# Patient Record
Sex: MALE | Race: WHITE | NOT HISPANIC OR LATINO | URBAN - METROPOLITAN AREA
[De-identification: names, ages, dates, MRNs, and addresses within clinical notes are randomized per-mention and may not be internally consistent; named-entity substitution may affect disease eponyms.]

---

## 2017-04-01 ENCOUNTER — OUTPATIENT (OUTPATIENT)
Dept: OUTPATIENT SERVICES | Facility: HOSPITAL | Age: 72
LOS: 1 days | Discharge: HOME | End: 2017-04-01

## 2017-06-27 DIAGNOSIS — I10 ESSENTIAL (PRIMARY) HYPERTENSION: ICD-10-CM

## 2017-06-27 DIAGNOSIS — E78.00 PURE HYPERCHOLESTEROLEMIA, UNSPECIFIED: ICD-10-CM

## 2017-06-27 DIAGNOSIS — Z00.00 ENCOUNTER FOR GENERAL ADULT MEDICAL EXAMINATION WITHOUT ABNORMAL FINDINGS: ICD-10-CM

## 2017-07-15 ENCOUNTER — OUTPATIENT (OUTPATIENT)
Dept: OUTPATIENT SERVICES | Facility: HOSPITAL | Age: 72
LOS: 1 days | Discharge: HOME | End: 2017-07-15

## 2017-07-15 DIAGNOSIS — E78.5 HYPERLIPIDEMIA, UNSPECIFIED: ICD-10-CM

## 2017-07-15 DIAGNOSIS — E78.00 PURE HYPERCHOLESTEROLEMIA, UNSPECIFIED: ICD-10-CM

## 2017-07-15 DIAGNOSIS — E11.9 TYPE 2 DIABETES MELLITUS WITHOUT COMPLICATIONS: ICD-10-CM

## 2017-11-04 ENCOUNTER — OUTPATIENT (OUTPATIENT)
Dept: OUTPATIENT SERVICES | Facility: HOSPITAL | Age: 72
LOS: 1 days | Discharge: HOME | End: 2017-11-04

## 2017-11-04 DIAGNOSIS — I10 ESSENTIAL (PRIMARY) HYPERTENSION: ICD-10-CM

## 2017-11-04 DIAGNOSIS — E78.00 PURE HYPERCHOLESTEROLEMIA, UNSPECIFIED: ICD-10-CM

## 2017-11-04 DIAGNOSIS — D51.0 VITAMIN B12 DEFICIENCY ANEMIA DUE TO INTRINSIC FACTOR DEFICIENCY: ICD-10-CM

## 2018-03-30 ENCOUNTER — OUTPATIENT (OUTPATIENT)
Dept: OUTPATIENT SERVICES | Facility: HOSPITAL | Age: 73
LOS: 1 days | Discharge: HOME | End: 2018-03-30

## 2018-03-30 DIAGNOSIS — D51.0 VITAMIN B12 DEFICIENCY ANEMIA DUE TO INTRINSIC FACTOR DEFICIENCY: ICD-10-CM

## 2018-03-30 DIAGNOSIS — E78.00 PURE HYPERCHOLESTEROLEMIA, UNSPECIFIED: ICD-10-CM

## 2018-03-30 DIAGNOSIS — I10 ESSENTIAL (PRIMARY) HYPERTENSION: ICD-10-CM

## 2018-07-28 ENCOUNTER — OUTPATIENT (OUTPATIENT)
Dept: OUTPATIENT SERVICES | Facility: HOSPITAL | Age: 73
LOS: 1 days | Discharge: HOME | End: 2018-07-28

## 2018-07-28 DIAGNOSIS — E78.00 PURE HYPERCHOLESTEROLEMIA, UNSPECIFIED: ICD-10-CM

## 2018-07-28 DIAGNOSIS — E78.5 HYPERLIPIDEMIA, UNSPECIFIED: ICD-10-CM

## 2018-07-28 DIAGNOSIS — D64.9 ANEMIA, UNSPECIFIED: ICD-10-CM

## 2018-07-28 DIAGNOSIS — I10 ESSENTIAL (PRIMARY) HYPERTENSION: ICD-10-CM

## 2018-07-28 DIAGNOSIS — Z00.00 ENCOUNTER FOR GENERAL ADULT MEDICAL EXAMINATION WITHOUT ABNORMAL FINDINGS: ICD-10-CM

## 2018-11-19 ENCOUNTER — OUTPATIENT (OUTPATIENT)
Dept: OUTPATIENT SERVICES | Facility: HOSPITAL | Age: 73
LOS: 1 days | Discharge: HOME | End: 2018-11-19

## 2018-11-19 DIAGNOSIS — Z00.00 ENCOUNTER FOR GENERAL ADULT MEDICAL EXAMINATION WITHOUT ABNORMAL FINDINGS: ICD-10-CM

## 2018-11-19 DIAGNOSIS — N40.0 BENIGN PROSTATIC HYPERPLASIA WITHOUT LOWER URINARY TRACT SYMPTOMS: ICD-10-CM

## 2018-11-19 DIAGNOSIS — E78.00 PURE HYPERCHOLESTEROLEMIA, UNSPECIFIED: ICD-10-CM

## 2018-11-19 DIAGNOSIS — I10 ESSENTIAL (PRIMARY) HYPERTENSION: ICD-10-CM

## 2019-05-07 ENCOUNTER — APPOINTMENT (OUTPATIENT)
Dept: VASCULAR SURGERY | Facility: CLINIC | Age: 74
End: 2019-05-07
Payer: COMMERCIAL

## 2019-05-07 VITALS
HEIGHT: 68 IN | BODY MASS INDEX: 34.1 KG/M2 | WEIGHT: 225 LBS | SYSTOLIC BLOOD PRESSURE: 126 MMHG | DIASTOLIC BLOOD PRESSURE: 82 MMHG

## 2019-05-07 PROCEDURE — 99203 OFFICE O/P NEW LOW 30 MIN: CPT

## 2019-05-07 NOTE — HISTORY OF PRESENT ILLNESS
[FreeTextEntry1] : The patient is a 73-year-old gentleman who had a recent lumbar spine x-ray and had an incidental finding of a 5 cm calcified aortic aneurysm. The patient was sent for a CTA of his abdomen and pelvis and showed a 4 x 4 cm infrarenal no leg aneurysm.

## 2019-05-07 NOTE — ASSESSMENT
[FreeTextEntry1] : The patient is a 72 yo male with a 4.0 cm AAA. I would recommend conservative management

## 2019-06-08 ENCOUNTER — OUTPATIENT (OUTPATIENT)
Dept: OUTPATIENT SERVICES | Facility: HOSPITAL | Age: 74
LOS: 1 days | Discharge: HOME | End: 2019-06-08

## 2019-06-08 DIAGNOSIS — I10 ESSENTIAL (PRIMARY) HYPERTENSION: ICD-10-CM

## 2019-06-08 DIAGNOSIS — E78.00 PURE HYPERCHOLESTEROLEMIA, UNSPECIFIED: ICD-10-CM

## 2019-06-08 DIAGNOSIS — Z00.00 ENCOUNTER FOR GENERAL ADULT MEDICAL EXAMINATION WITHOUT ABNORMAL FINDINGS: ICD-10-CM

## 2019-10-14 ENCOUNTER — OUTPATIENT (OUTPATIENT)
Dept: OUTPATIENT SERVICES | Facility: HOSPITAL | Age: 74
LOS: 1 days | Discharge: HOME | End: 2019-10-14

## 2019-10-14 DIAGNOSIS — D51.0 VITAMIN B12 DEFICIENCY ANEMIA DUE TO INTRINSIC FACTOR DEFICIENCY: ICD-10-CM

## 2019-10-14 DIAGNOSIS — N40.0 BENIGN PROSTATIC HYPERPLASIA WITHOUT LOWER URINARY TRACT SYMPTOMS: ICD-10-CM

## 2019-10-14 DIAGNOSIS — Z00.00 ENCOUNTER FOR GENERAL ADULT MEDICAL EXAMINATION WITHOUT ABNORMAL FINDINGS: ICD-10-CM

## 2019-10-14 DIAGNOSIS — E78.00 PURE HYPERCHOLESTEROLEMIA, UNSPECIFIED: ICD-10-CM

## 2019-11-19 ENCOUNTER — APPOINTMENT (OUTPATIENT)
Dept: VASCULAR SURGERY | Facility: CLINIC | Age: 74
End: 2019-11-19
Payer: COMMERCIAL

## 2019-11-22 ENCOUNTER — APPOINTMENT (OUTPATIENT)
Dept: VASCULAR SURGERY | Facility: CLINIC | Age: 74
End: 2019-11-22
Payer: COMMERCIAL

## 2019-11-22 VITALS
HEIGHT: 68 IN | SYSTOLIC BLOOD PRESSURE: 118 MMHG | WEIGHT: 220 LBS | BODY MASS INDEX: 33.34 KG/M2 | DIASTOLIC BLOOD PRESSURE: 74 MMHG

## 2019-11-22 PROCEDURE — 93978 VASCULAR STUDY: CPT

## 2019-11-22 PROCEDURE — 99213 OFFICE O/P EST LOW 20 MIN: CPT

## 2019-11-22 NOTE — ASSESSMENT
[FreeTextEntry1] : 75 y/o gentleman with AAA presents for routine follow up, denies any complaints. I performed an arterial duplex which was medically necessary to evaluate for increase in size of the AAA. It showed infrarenal AAA at 4.3 cm. It is not at a size requiring any surgical intervention at this time.\par I have informed him of the test results and advised follow up in six months for repeat aortic duplex.

## 2019-11-22 NOTE — DATA REVIEWED
[FreeTextEntry1] : I performed an arterial duplex which was medically necessary to evaluate for increase in size of the AAA. It showed infrarenal AAA at 4.3 cm.\par

## 2020-02-04 ENCOUNTER — INPATIENT (INPATIENT)
Facility: HOSPITAL | Age: 75
LOS: 9 days | Discharge: NURSING, REHAB OR LONG-TERM CARE | End: 2020-02-14
Attending: HOSPITALIST | Admitting: HOSPITALIST
Payer: COMMERCIAL

## 2020-02-04 VITALS
WEIGHT: 197.98 LBS | HEART RATE: 80 BPM | RESPIRATION RATE: 18 BRPM | DIASTOLIC BLOOD PRESSURE: 108 MMHG | TEMPERATURE: 98 F | OXYGEN SATURATION: 99 % | SYSTOLIC BLOOD PRESSURE: 127 MMHG

## 2020-02-04 LAB
ALBUMIN SERPL ELPH-MCNC: 4.3 G/DL — SIGNIFICANT CHANGE UP (ref 3.5–5.2)
ALP SERPL-CCNC: 53 U/L — SIGNIFICANT CHANGE UP (ref 30–115)
ALT FLD-CCNC: 13 U/L — SIGNIFICANT CHANGE UP (ref 0–41)
ANION GAP SERPL CALC-SCNC: 13 MMOL/L — SIGNIFICANT CHANGE UP (ref 7–14)
ANION GAP SERPL CALC-SCNC: 15 MMOL/L — HIGH (ref 7–14)
AST SERPL-CCNC: 38 U/L — SIGNIFICANT CHANGE UP (ref 0–41)
BASOPHILS # BLD AUTO: 0.04 K/UL — SIGNIFICANT CHANGE UP (ref 0–0.2)
BASOPHILS NFR BLD AUTO: 0.5 % — SIGNIFICANT CHANGE UP (ref 0–1)
BILIRUB SERPL-MCNC: 0.3 MG/DL — SIGNIFICANT CHANGE UP (ref 0.2–1.2)
BUN SERPL-MCNC: 20 MG/DL — SIGNIFICANT CHANGE UP (ref 10–20)
BUN SERPL-MCNC: 20 MG/DL — SIGNIFICANT CHANGE UP (ref 10–20)
CALCIUM SERPL-MCNC: 9.1 MG/DL — SIGNIFICANT CHANGE UP (ref 8.5–10.1)
CALCIUM SERPL-MCNC: 9.2 MG/DL — SIGNIFICANT CHANGE UP (ref 8.5–10.1)
CHLORIDE SERPL-SCNC: 100 MMOL/L — SIGNIFICANT CHANGE UP (ref 98–110)
CHLORIDE SERPL-SCNC: 99 MMOL/L — SIGNIFICANT CHANGE UP (ref 98–110)
CO2 SERPL-SCNC: 18 MMOL/L — SIGNIFICANT CHANGE UP (ref 17–32)
CO2 SERPL-SCNC: 21 MMOL/L — SIGNIFICANT CHANGE UP (ref 17–32)
CREAT SERPL-MCNC: 1.1 MG/DL — SIGNIFICANT CHANGE UP (ref 0.7–1.5)
CREAT SERPL-MCNC: 1.1 MG/DL — SIGNIFICANT CHANGE UP (ref 0.7–1.5)
EOSINOPHIL # BLD AUTO: 0.2 K/UL — SIGNIFICANT CHANGE UP (ref 0–0.7)
EOSINOPHIL NFR BLD AUTO: 2.4 % — SIGNIFICANT CHANGE UP (ref 0–8)
GLUCOSE SERPL-MCNC: 124 MG/DL — HIGH (ref 70–99)
GLUCOSE SERPL-MCNC: 96 MG/DL — SIGNIFICANT CHANGE UP (ref 70–99)
HCT VFR BLD CALC: 38.7 % — LOW (ref 42–52)
HGB BLD-MCNC: 11.7 G/DL — LOW (ref 14–18)
IMM GRANULOCYTES NFR BLD AUTO: 0.6 % — HIGH (ref 0.1–0.3)
IRON SATN MFR SERPL: 24 UG/DL — LOW (ref 35–150)
IRON SATN MFR SERPL: 6 % — LOW (ref 15–50)
LYMPHOCYTES # BLD AUTO: 2.14 K/UL — SIGNIFICANT CHANGE UP (ref 1.2–3.4)
LYMPHOCYTES # BLD AUTO: 25.4 % — SIGNIFICANT CHANGE UP (ref 20.5–51.1)
MCHC RBC-ENTMCNC: 23.1 PG — LOW (ref 27–31)
MCHC RBC-ENTMCNC: 30.2 G/DL — LOW (ref 32–37)
MCV RBC AUTO: 76.3 FL — LOW (ref 80–94)
MONOCYTES # BLD AUTO: 0.66 K/UL — HIGH (ref 0.1–0.6)
MONOCYTES NFR BLD AUTO: 7.8 % — SIGNIFICANT CHANGE UP (ref 1.7–9.3)
NEUTROPHILS # BLD AUTO: 5.34 K/UL — SIGNIFICANT CHANGE UP (ref 1.4–6.5)
NEUTROPHILS NFR BLD AUTO: 63.3 % — SIGNIFICANT CHANGE UP (ref 42.2–75.2)
NRBC # BLD: 0 /100 WBCS — SIGNIFICANT CHANGE UP (ref 0–0)
OSMOLALITY SERPL: 297 MOSMOL/KG — SIGNIFICANT CHANGE UP (ref 280–301)
PLATELET # BLD AUTO: 293 K/UL — SIGNIFICANT CHANGE UP (ref 130–400)
POTASSIUM SERPL-MCNC: 5.3 MMOL/L — HIGH (ref 3.5–5)
POTASSIUM SERPL-MCNC: 5.8 MMOL/L — HIGH (ref 3.5–5)
POTASSIUM SERPL-SCNC: 5.3 MMOL/L — HIGH (ref 3.5–5)
POTASSIUM SERPL-SCNC: 5.8 MMOL/L — HIGH (ref 3.5–5)
PROT SERPL-MCNC: 7.6 G/DL — SIGNIFICANT CHANGE UP (ref 6–8)
RBC # BLD: 5.07 M/UL — SIGNIFICANT CHANGE UP (ref 4.7–6.1)
RBC # FLD: 17.5 % — HIGH (ref 11.5–14.5)
SODIUM SERPL-SCNC: 132 MMOL/L — LOW (ref 135–146)
SODIUM SERPL-SCNC: 134 MMOL/L — LOW (ref 135–146)
TIBC SERPL-MCNC: 423 UG/DL — SIGNIFICANT CHANGE UP (ref 220–430)
TRANSFERRIN SERPL-MCNC: 331 MG/DL — SIGNIFICANT CHANGE UP (ref 200–360)
UIBC SERPL-MCNC: 399 UG/DL — HIGH (ref 110–370)
WBC # BLD: 8.43 K/UL — SIGNIFICANT CHANGE UP (ref 4.8–10.8)
WBC # FLD AUTO: 8.43 K/UL — SIGNIFICANT CHANGE UP (ref 4.8–10.8)

## 2020-02-04 PROCEDURE — 99285 EMERGENCY DEPT VISIT HI MDM: CPT

## 2020-02-04 PROCEDURE — 93010 ELECTROCARDIOGRAM REPORT: CPT

## 2020-02-04 PROCEDURE — 71045 X-RAY EXAM CHEST 1 VIEW: CPT | Mod: 26

## 2020-02-04 RX ORDER — MORPHINE SULFATE 50 MG/1
2 CAPSULE, EXTENDED RELEASE ORAL EVERY 4 HOURS
Refills: 0 | Status: DISCONTINUED | OUTPATIENT
Start: 2020-02-04 | End: 2020-02-06

## 2020-02-04 RX ORDER — OXYCODONE HYDROCHLORIDE 5 MG/1
10 TABLET ORAL EVERY 4 HOURS
Refills: 0 | Status: DISCONTINUED | OUTPATIENT
Start: 2020-02-04 | End: 2020-02-11

## 2020-02-04 RX ORDER — DEXAMETHASONE 0.5 MG/5ML
10 ELIXIR ORAL ONCE
Refills: 0 | Status: COMPLETED | OUTPATIENT
Start: 2020-02-04 | End: 2020-02-04

## 2020-02-04 RX ORDER — SIMVASTATIN 20 MG/1
80 TABLET, FILM COATED ORAL AT BEDTIME
Refills: 0 | Status: DISCONTINUED | OUTPATIENT
Start: 2020-02-04 | End: 2020-02-11

## 2020-02-04 RX ORDER — ACETAMINOPHEN 500 MG
650 TABLET ORAL EVERY 4 HOURS
Refills: 0 | Status: DISCONTINUED | OUTPATIENT
Start: 2020-02-04 | End: 2020-02-07

## 2020-02-04 RX ORDER — ENOXAPARIN SODIUM 100 MG/ML
40 INJECTION SUBCUTANEOUS DAILY
Refills: 0 | Status: DISCONTINUED | OUTPATIENT
Start: 2020-02-04 | End: 2020-02-07

## 2020-02-04 RX ORDER — PANTOPRAZOLE SODIUM 20 MG/1
40 TABLET, DELAYED RELEASE ORAL
Refills: 0 | Status: DISCONTINUED | OUTPATIENT
Start: 2020-02-04 | End: 2020-02-11

## 2020-02-04 RX ORDER — GABAPENTIN 400 MG/1
100 CAPSULE ORAL THREE TIMES A DAY
Refills: 0 | Status: DISCONTINUED | OUTPATIENT
Start: 2020-02-04 | End: 2020-02-07

## 2020-02-04 RX ORDER — FERROUS SULFATE 325(65) MG
325 TABLET ORAL DAILY
Refills: 0 | Status: DISCONTINUED | OUTPATIENT
Start: 2020-02-04 | End: 2020-02-11

## 2020-02-04 RX ORDER — SENNA PLUS 8.6 MG/1
2 TABLET ORAL AT BEDTIME
Refills: 0 | Status: DISCONTINUED | OUTPATIENT
Start: 2020-02-04 | End: 2020-02-07

## 2020-02-04 RX ORDER — LANOLIN ALCOHOL/MO/W.PET/CERES
5 CREAM (GRAM) TOPICAL AT BEDTIME
Refills: 0 | Status: DISCONTINUED | OUTPATIENT
Start: 2020-02-04 | End: 2020-02-11

## 2020-02-04 RX ORDER — TRAMADOL HYDROCHLORIDE 50 MG/1
50 TABLET ORAL EVERY 6 HOURS
Refills: 0 | Status: DISCONTINUED | OUTPATIENT
Start: 2020-02-04 | End: 2020-02-06

## 2020-02-04 RX ORDER — OXYCODONE AND ACETAMINOPHEN 5; 325 MG/1; MG/1
1 TABLET ORAL ONCE
Refills: 0 | Status: DISCONTINUED | OUTPATIENT
Start: 2020-02-04 | End: 2020-02-04

## 2020-02-04 RX ORDER — LISINOPRIL 2.5 MG/1
20 TABLET ORAL EVERY 12 HOURS
Refills: 0 | Status: DISCONTINUED | OUTPATIENT
Start: 2020-02-04 | End: 2020-02-07

## 2020-02-04 RX ORDER — POLYETHYLENE GLYCOL 3350 17 G/17G
17 POWDER, FOR SOLUTION ORAL DAILY
Refills: 0 | Status: DISCONTINUED | OUTPATIENT
Start: 2020-02-04 | End: 2020-02-07

## 2020-02-04 RX ORDER — KETOROLAC TROMETHAMINE 30 MG/ML
30 SYRINGE (ML) INJECTION ONCE
Refills: 0 | Status: DISCONTINUED | OUTPATIENT
Start: 2020-02-04 | End: 2020-02-04

## 2020-02-04 RX ADMIN — Medication 10 MILLIGRAM(S): at 11:39

## 2020-02-04 RX ADMIN — GABAPENTIN 100 MILLIGRAM(S): 400 CAPSULE ORAL at 21:57

## 2020-02-04 RX ADMIN — OXYCODONE HYDROCHLORIDE 10 MILLIGRAM(S): 5 TABLET ORAL at 19:20

## 2020-02-04 RX ADMIN — MORPHINE SULFATE 2 MILLIGRAM(S): 50 CAPSULE, EXTENDED RELEASE ORAL at 22:54

## 2020-02-04 RX ADMIN — Medication 30 MILLIGRAM(S): at 11:39

## 2020-02-04 RX ADMIN — SIMVASTATIN 80 MILLIGRAM(S): 20 TABLET, FILM COATED ORAL at 21:56

## 2020-02-04 RX ADMIN — LISINOPRIL 20 MILLIGRAM(S): 2.5 TABLET ORAL at 21:56

## 2020-02-04 RX ADMIN — OXYCODONE AND ACETAMINOPHEN 1 TABLET(S): 5; 325 TABLET ORAL at 11:39

## 2020-02-04 RX ADMIN — Medication 5 MILLIGRAM(S): at 21:57

## 2020-02-04 RX ADMIN — MORPHINE SULFATE 2 MILLIGRAM(S): 50 CAPSULE, EXTENDED RELEASE ORAL at 17:39

## 2020-02-04 NOTE — H&P ADULT - NSICDXPASTMEDICALHX_GEN_ALL_CORE_FT
PAST MEDICAL HISTORY:  Abdominal aortic aneurysm     Elevated lipids     History of degenerative disc disease     HTN (hypertension)

## 2020-02-04 NOTE — CONSULT NOTE ADULT - SUBJECTIVE AND OBJECTIVE BOX
HPI:  [74 year old male]    CC: Back Pain    PMH: Multiple herniated discs, HTN, HLD, GERD, AAA     History of Present Illness goes back to the past month when the patient started having back pain that has been chronic but with recent worsening. He was seeing neurology, rehab and pain management with little improvement. This morning the pain was so bad upon walking that he called EMS and presented to the ED. He had an MRI done which showed multiple levels of degenerative disease and disc herniations. He denies numbness, weakness, saddle anesthesia, urinary retention of fecal incontinence.     In the ED, vitals were stable. He was given decadron, ketorolac and oxycodone (04 Feb 2020 14:54)      PAST MEDICAL & SURGICAL HISTORY:  History of degenerative disc disease  Elevated lipids  Abdominal aortic aneurysm  HTN (hypertension)      Hospital Course:  He has a copy of the rport of his recent lumbar spine MRI. He has been experiencing LLE radicular symptoms for a month which have bcome severe for the last one week. He gets signif pain with standing. He reports even with a cane for the last week it wouldn't allow him to walk on account of the severe pain. earlier he had LLE radicular symptoms and left knee buckling which appeared to get better initially.   TODAY'S SUBJECTIVE & REVIEW OF SYMPTOMS:     Constitutional WNL   Cardio WNL   Resp WNL   GI WNL  Heme WNL  Endo WNL  Skin WNL  MSK WNL  Neuro WNL  Cognitive WNL  Psych WNL      MEDICATIONS  (STANDING):  enoxaparin Injectable 40 milliGRAM(s) SubCutaneous daily  gabapentin 100 milliGRAM(s) Oral three times a day  lisinopril 20 milliGRAM(s) Oral every 12 hours  pantoprazole    Tablet 40 milliGRAM(s) Oral before breakfast  simvastatin Oral Tab/Cap - Peds 80 milliGRAM(s) Oral at bedtime    MEDICATIONS  (PRN):  acetaminophen   Tablet .. 650 milliGRAM(s) Oral every 4 hours PRN Mild Pain (1 - 3)  morphine  - Injectable 2 milliGRAM(s) IV Push every 4 hours PRN Severe Pain (7 - 10)  oxyCODONE    IR 10 milliGRAM(s) Oral every 4 hours PRN Severe Pain (7 - 10)  polyethylene glycol 3350 17 Gram(s) Oral daily PRN Constipation  senna 2 Tablet(s) Oral at bedtime PRN Constipation  traMADol 50 milliGRAM(s) Oral every 6 hours PRN Moderate Pain (4 - 6)      FAMILY HISTORY:  Family history of degenerative disc disease: Sister and mother      Allergies    No Known Allergies    Intolerances        SOCIAL HISTORY:    [  ] Etoh  [  ] Smoking  [  ] Substance abuse     Home Environment:  [  ] Home Alone  [x  ] Lives with Family-son  [  ] Home Health Aid    Dwelling:  [  ] Apartment  [  ] Private House  [  ] Adult Home  [  ] Skilled Nursing Facility      [  ] Short Term  [  ] Long Term  [  ] Stairs       Elevator [  ]    FUNCTIONAL STATUS PTA: (Check all that apply)  Ambulation: [ x  ]Independent    [  ] Dependent     [  ] Non-Ambulatory  Assistive Device: [  ] SA Cane  [  ]  Q Cane  [  ] Walker  [  ]  Wheelchair  ADL : [  ] Independent  [  ]  Dependent       Vital Signs Last 24 Hrs  T(C): 36.7 (04 Feb 2020 16:48), Max: 36.7 (04 Feb 2020 16:48)  T(F): 98 (04 Feb 2020 16:48), Max: 98 (04 Feb 2020 16:48)  HR: 77 (04 Feb 2020 16:48) (77 - 80)  BP: 145/71 (04 Feb 2020 16:48) (127/108 - 145/71)  BP(mean): --  RR: 18 (04 Feb 2020 16:48) (18 - 18)  SpO2: 96% (04 Feb 2020 16:48) (96% - 99%)      PHYSICAL EXAM: Alert & Oriented X3  GENERAL: NAD, well-groomed, well-developed  HEAD:  Atraumatic, Normocephalic  EYES: EOMI, PERRLA, conjunctiva and sclera clear  NECK: Supple, No JVD, Normal thyroid  CHEST/LUNG: Clear to percussion bilaterally; No rales, rhonchi, wheezing, or rubs  HEART: Regular rate and rhythm; No murmurs, rubs, or gallops  ABDOMEN: Soft, Nontender, Nondistended; Bowel sounds present  EXTREMITIES:  2+ Peripheral Pulses, No clubbing, cyanosis, or edema    NERVOUS SYSTEM:  Cranial Nerves 2-12 intact [  ] Abnormal  [  ]  ROM: WFL all extremities [  ]  Abnormal [  ]  Motor Strength: WFL all extremities  [ x ]  Abnormal [  ]   LLE in bed examination  Sensation: intact to light touch [  ] Abnormal [  ]  Reflexes: Symmetric [  ]  Abnormal [  ]    FUNCTIONAL STATUS:  Bed Mobility: Independent [  ]  Supervision [  ]  Needs Assistance [  ]  N/A [  ]  Transfers: Independent [  ]  Supervision [  ]  Needs Assistance [  ]  N/A [  ]   Ambulation: Independent [  ]  Supervision [  ]  Needs Assistance [  ]  N/A [  ]  ADL: Independent [  ] Requires Assistance [  ] N/A [  ]      LABS:                        11.7   8.43  )-----------( 293      ( 04 Feb 2020 12:03 )             38.7     02-04    132<L>  |  99  |  20  ----------------------------<  96  5.8<H>   |  18  |  1.1    Ca    9.1      04 Feb 2020 12:03    TPro  7.6  /  Alb  4.3  /  TBili  0.3  /  DBili  x   /  AST  38  /  ALT  13  /  AlkPhos  53  02-04          RADIOLOGY & ADDITIONAL STUDIES:    Assesment:

## 2020-02-04 NOTE — H&P ADULT - NSHPPHYSICALEXAM_GEN_ALL_CORE
=================== OBJECTIVE ===================    VITAL SIGNS: Last 24 Hours  T(C): 36.4 (04 Feb 2020 10:48), Max: 36.4 (04 Feb 2020 10:48)  T(F): 97.6 (04 Feb 2020 10:48), Max: 97.6 (04 Feb 2020 10:48)  HR: 80 (04 Feb 2020 10:48) (80 - 80)  BP: 127/108 (04 Feb 2020 10:48) (127/108 - 127/108)  BP(mean): --  RR: 18 (04 Feb 2020 10:48) (18 - 18)  SpO2: 99% (04 Feb 2020 10:48) (99% - 99%)    PHYSICAL EXAM:  GENERAL: NAD, well-developed  CHEST/LUNG: Clear to auscultation bilaterally; No wheeze  HEART: Regular rate and rhythm; No murmurs, rubs, or gallops  ABDOMEN: Soft, Nontender, Nondistended; Bowel sounds present  EXTREMITIES:  2+ Peripheral Pulses, No clubbing, cyanosis, or edema  PSYCH: AAOx3  NEUROLOGY: non-focal  General:  Appearance is consistent with chronologic age.  No abnormal facies.   General: AA&Ox3. Fund of knowledge is intact and normal.  Language with normal repetition, comprehension and naming.  Nondysarthric.    Motor examination:   Normal tone, bulk and range of motion.   Formal Muscle Strength Testing: (MRC grade R/L) 5/5 UE; 5/5 LE.  No observable drift.   Sensory examination: Symmetric to light touch and pinprick, pain, proprioception and vibration in all extremities.  Cerebellum: FTN intact with normal LALITO in all limbs.  No dysmetria or dysdiadokinesia.    SKIN: No rashes or lesions =================== OBJECTIVE ===================    VITAL SIGNS: Last 24 Hours  T(C): 36.4 (04 Feb 2020 10:48), Max: 36.4 (04 Feb 2020 10:48)  T(F): 97.6 (04 Feb 2020 10:48), Max: 97.6 (04 Feb 2020 10:48)  HR: 80 (04 Feb 2020 10:48) (80 - 80)  BP: 127/108 (04 Feb 2020 10:48) (127/108 - 127/108)  BP(mean): --  RR: 18 (04 Feb 2020 10:48) (18 - 18)  SpO2: 99% (04 Feb 2020 10:48) (99% - 99%)    PHYSICAL EXAM:  GENERAL: NAD, well-developed  CHEST/LUNG: Clear to auscultation bilaterally; No wheeze  HEART: Regular rate and rhythm; No murmurs, rubs, or gallops  ABDOMEN: Soft, Nontender, Nondistended; Bowel sounds present  EXTREMITIES:  2+ Peripheral Pulses, No clubbing, cyanosis, or edema  PSYCH: AAOx3  NEUROLOGY: non-focal  General:  Appearance is consistent with chronologic age.  No abnormal facies.   General: AA&Ox3. Fund of knowledge is intact and normal.  Language with normal repetition, comprehension and naming.  Nondysarthric.    Motor examination:   Normal tone, bulk and range of motion.   Formal Muscle Strength Testing: (MRC grade R/L) 5/5 UE; 5/5 LE.  No observable drift.   Sensory examination: Slightly decreased sensation to light touch and pinprick, pain, proprioception and vibration in LLE  Cerebellum: FTN intact with normal LALITO in all limbs.  No dysmetria or dysdiadokinesia.    SKIN: No rashes or lesions =================== OBJECTIVE ===================    VITAL SIGNS: Last 24 Hours  T(C): 36.4 (04 Feb 2020 10:48), Max: 36.4 (04 Feb 2020 10:48)  T(F): 97.6 (04 Feb 2020 10:48), Max: 97.6 (04 Feb 2020 10:48)  HR: 80 (04 Feb 2020 10:48) (80 - 80)  BP: 127/108 (04 Feb 2020 10:48) (127/108 - 127/108)  BP(mean): --  RR: 18 (04 Feb 2020 10:48) (18 - 18)  SpO2: 99% (04 Feb 2020 10:48) (99% - 99%)    PHYSICAL EXAM:  GENERAL: NAD, well-developed. Comfortable lying flat but in severe pain upon standing up and walking and actively lifting LEs   CHEST/LUNG: Clear to auscultation bilaterally; No wheeze  HEART: Regular rate and rhythm; No murmurs, rubs, or gallops  ABDOMEN: Soft, Nontender, Nondistended; Bowel sounds present  EXTREMITIES:  2+ Peripheral Pulses, No clubbing, cyanosis, or edema  PSYCH: AAOx3  NEUROLOGY: non-focal. Passive leg raise test was negative   General:  Appearance is consistent with chronologic age.  No abnormal facies.   General: AA&Ox3. Fund of knowledge is intact and normal.  Language with normal repetition, comprehension and naming.  Nondysarthric.    Motor examination:   Normal tone, bulk and range of motion.   Formal Muscle Strength Testing: (MRC grade R/L) 5/5 UE; 5/5 LE.  No observable drift.   Sensory examination: Slightly decreased sensation to light touch and pinprick, pain, proprioception and vibration in LLE  Cerebellum: FTN intact with normal LALITO in all limbs.  No dysmetria or dysdiadokinesia.    SKIN: No rashes or lesions

## 2020-02-04 NOTE — H&P ADULT - ATTENDING COMMENTS
PHYSICAL EXAM:    CONSTITUTIONAL: NAD, currently comfortable lying on stretcher  ENMT: EOMI, PERRLA, No tonsillar erythema, exudates, or enlargement, neck supple, No JVD  PSYCH: Alert & Oriented X3  RESPIRATORY: Clear to percussion bilaterally; No rales, rhonchi, wheezing, or rubs  CARDIOVASCULAR: Regular rate and rhythm; No murmurs, rubs, or gallops, negative edema  GASTROINTESTINAL: Soft, Nontender, Nondistended; Bowel sounds present  EXTREMITIES:  2+ Peripheral Pulses, No clubbing, cyanosis  SKIN: No rashes or lesions    73 yo M with PMHx of HTN, AAA, GERD, HLD presented with complaint of lower back pain with shooting radiation down left lower extremity to level of knee for one month's duration. Patient was recently in [place], symptoms began around 12/28-12/29, denies any trauma, fall, heavy lifting, urinary or fecal incontinence, paraesthesias, weakness. Patient also recently lost his wife 5 months ago endorses he has not "felt the same" since, requests sleeping aide, on melatonin at home.    #Intractable Lower Back Pain, Left Sided Sciatica, Multiple level Disc Disease: pain management, neurology evaluation, PT/OT, fall risk precautions     #HTN/HLD/GERD: continue home medications     Disposition: PT/Rehab evaluation for safe disposition.

## 2020-02-04 NOTE — ED PROVIDER NOTE - NS ED ROS FT
Constitutional: See HPI.  Eyes: No visual changes, eye pain or discharge.   ENMT: No hearing changes, pain, discharge or infections.   Cardiac: No SOB or edema. No chest pain with exertion.  Respiratory: No cough or respiratory distress.   GI: No nausea, vomiting, diarrhea or abdominal pain.  : No dysuria, frequency or burning. No Discharge  MS: + back pain. No myalgia, muscle weakness, joint pain   Neuro: No headache or weakness.   Skin: No skin rash.  Except as documented in the HPI, all other systems are negative.

## 2020-02-04 NOTE — ED PROVIDER NOTE - ATTENDING CONTRIBUTION TO CARE
73 y/o male with above stated PMHx presents to ED for progressively worsening left sided back pain, radiating to the left leg with ambulatory dysfunction.  No bowel or bladder dysfunction.  No weakness.  PE:  agree with above.  A/P:  Intractable Back Pain, ADMIT for pain control, neurosurgery consult.

## 2020-02-04 NOTE — ED PROVIDER NOTE - PHYSICAL EXAMINATION
CONST: Well appearing in NAD  EYES: Sclera and conjunctiva clear.  NECK: C/T/L tenderness  GI: Soft, non-tender, non-distended, no CVA tenderness   MS: + TTP L lumbar paravertebral muscles, no midline tenderness, + L straight leg raise, Normal ROM in all extremities. No edema of lower extremities, no calf pain, radial pulses 2+ bilaterally  SKIN: Warm, dry, no acute rashes. Good turgor  NEURO: A&Ox3, No focal deficits. Strength 5/5 with no sensory deficits. Steady gait

## 2020-02-04 NOTE — H&P ADULT - HISTORY OF PRESENT ILLNESS
[74 year old male]    CC: Back Pain    PMH: Multiple herniated discs, HTN, HLD, GERD, AAA     History of Present Illness goes back to  For past few months intermittent L lumbar pain radiating down L leg, moderate severity, worse w/ ambulation, improved with certain positions.  Had MRI 2/1/20 which showed multiple disc herniations.  Follows w/ Dr. Fuentes.  Denies saddle anesthesia, bowel/bladder incontinence/ retention, weakness of lower extremities,  diarrhea, constipation, urinary sxs, fever, chills.    In the ED, vitals were stable. He was given decadron, ketorolac and oxycodone [74 year old male]    CC: Back Pain    PMH: Multiple herniated discs, HTN, HLD, GERD, AAA     History of Present Illness goes back to the past month when the patient started having back pain that has been chronic but with recent worsening. He was seeing neurology, rehab and pain management with little improvement. This morning the pain was so bad upon walking that he called EMS and presented to the ED. He had an MRI done which showed multiple levels of degenerative disease and disc herniations. He denies numbness, weakness, saddle anesthesia, urinary retention of fecal incontinence.     In the ED, vitals were stable. He was given decadron, ketorolac and oxycodone

## 2020-02-04 NOTE — ED PROVIDER NOTE - CLINICAL SUMMARY MEDICAL DECISION MAKING FREE TEXT BOX
Feels a bit better with meds.  Still only marginally ambulatory.  ADMIT for PT, Rehab and neurosurgery consults. Stable for floor.

## 2020-02-04 NOTE — ED PROVIDER NOTE - OBJECTIVE STATEMENT
74 y.o male w/ hx of multiple herniated discs, HTN, HLD, GERD presents to the ED for evaluation of chronic back pain x months.  For past few months intermittent L lumbar pain radiating down L leg, moderate severity, worse w/ ambulation, improved with certain positions.  Had MRI 2/1/20 which showed multiple disc herniations.  Follows w/ Dr. Fuentes.  Denies saddle anesthesia, bowel/bladder incontinence/ retention, weakness of lower extremities,  diarrhea, constipation, urinary sxs, fever, chills.

## 2020-02-04 NOTE — H&P ADULT - ASSESSMENT
================= ASSESSMENT/PLAN ==================  Patient is a 74y old Male who presents with a chief complaint of Currently admitted to medicine with the primary diagnosis of Intractable back pain    # Intractable back pain secondary to multiple level disc disease   Outpatient MRI report:   No clinical evidence of chord compression   PLAN  - Neurology evaluation   - Neurosurgery evaluation   - Pain management evaluation   - In the meantime start pain regimen   - Bowel regimen   - PT/Rehab     # HTN   - Continue home meds     # DLD  - Continue home meds     # GERD  - Continue home meds     # AAA  - Outpatient follow up with Dr. Sosa     GI PPX: Pantoprazole   DVT PPX: Lovenox     DIET: DASH  ACTIVITY:  () Ad Angelia  /  (X) Advance as Tolerated  /  () Bed Rest  /  () Fall Precaution  /  () Seizure precaution    ================= DISPOSITION ==================    Patient to be discharged when condition(s) optimized.            Discharge to: Home when cleared vs SNF            Home services:              Counseled on/Discussed cessation of:  () Risky behaviors  /  () Smoking cessation  /  () Diet  /  () Exercise  /  () Other    ================= CODE STATUS =================                  (X) FULL CODE     |     () DNR     |     () DNI    () Discussion with patient and/or family regarding goals of care ================= ASSESSMENT/PLAN ==================  Patient is a 74y old Male who presents with a chief complaint of Currently admitted to medicine with the primary diagnosis of Intractable back pain    # Intractable back pain secondary to multiple level disc disease   Outpatient MRI report:   No clinical evidence of chord compression   PLAN  - Neurology evaluation   - Neurosurgery evaluation   - Pain management evaluation   - In the meantime start pain regimen: Gabapentin, Tylenol Oxycodone ER and morphine IV for breakthrough pain. Will avoid NSAIDS given GERD and ?CAD     - Bowel regimen   - PT/Rehab     # HTN   - Continue home meds   - Patient was on aspirin 325 at home unclear reason. Will continue 81 for now (no on record history of CAD)     # DLD  - Continue home meds     # GERD  - Continue home meds     # AAA - Stable being monitored   - Outpatient follow up with Dr. Sosa     GI PPX: Pantoprazole   DVT PPX: Lovenox     DIET: DASH  ACTIVITY:  () Ad Angelia  /  (X) Advance as Tolerated  /  () Bed Rest  /  () Fall Precaution  /  () Seizure precaution    ================= DISPOSITION ==================    Patient to be discharged when condition(s) optimized.            Discharge to: Home when cleared vs SNF            Home services:              Counseled on/Discussed cessation of:  () Risky behaviors  /  () Smoking cessation  /  () Diet  /  () Exercise  /  () Other    ================= CODE STATUS =================                  (X) FULL CODE     |     () DNR     |     () DNI    () Discussion with patient and/or family regarding goals of care ================= ASSESSMENT/PLAN =================  Patient is a 74y old Male who presents with a chief complaint of Currently admitted to medicine with the primary diagnosis of Intractable back pain    # Intractable back pain secondary to multiple level disc disease   Outpatient MRI report:   No clinical evidence of chord compression   PLAN  - Neurology evaluation   - Neurosurgery evaluation   - Pain management evaluation   - In the meantime start pain regimen: Gabapentin, Tylenol Oxycodone ER and morphine IV for breakthrough pain. Will avoid NSAIDS given GERD and ?CAD     - Bowel regimen   - PT/Rehab     # HTN   - Continue home meds   - Patient was on aspirin 325 at home unclear reason. Will continue 81 for now (no on record history of CAD)     # DLD  - Continue home meds     # GERD  - Continue home meds     # AAA - Stable being monitored   - Outpatient follow up with Dr. Sosa     GI PPX: Pantoprazole   DVT PPX: Lovenox     DIET: DASH  ACTIVITY:  () Ad Angelia  /  (X) Advance as Tolerated  /  () Bed Rest  /  () Fall Precaution  /  () Seizure precaution    ================= DISPOSITION ==================    Patient to be discharged when condition(s) optimized.            Discharge to: Home when cleared vs SNF            Home services:              Counseled on/Discussed cessation of:  () Risky behaviors  /  () Smoking cessation  /  () Diet  /  () Exercise  /  () Other    ================= CODE STATUS =================                  (X) FULL CODE     |     () DNR     |     () DNI    () Discussion with patient and/or family regarding goals of care ================= ASSESSMENT/PLAN =================  Patient is a 74y old Male who presents with a chief complaint of Currently admitted to medicine with the primary diagnosis of Intractable back pain    # Intractable back pain secondary to multiple level disc disease   Outpatient MRI report:   Outpatient MRI impression:   1-Multilevel degenerative disc disease without evidence of fracture or bone marrow edema   2-L2-L3 demonstrates an extruded disc herniation with focally involving the left paracentral and articular recess regions which demonstrates caudal extension posterior to the L3 vertebral boy. The dis material measures 2.3X0.98 cm with contact and displacement of the intradural left L3 nerve root. There is morederate central spinal canal stenosis with moderate narrowing of the left neuroforamen and mild to moderate narrowing of the righ neuroforamen   3-L3-4 space demonstrates contact of the exiting left L3  nerve root. There is mild to moderate central spinal canal stenosis with mild to moderate narrowing of the left neuroforamen and mild narrowing of the the right neuroforamen   4-L4-5 space demonstrates contact of the intradural L5 nerve roots and the exiting L4 nerve roots. There is facet arthropathy and hypertrophy of the ligamentum flavum more prominent on the left. There is moderate to severe central spinal canal stenosis with moderate to severe narrowing of the right neuroforamen and severe narrowing of the left neuroforamen   5-L5-S1 disc space demonstrates contact and displacement and the intradural left S1 nerve root. There is narrowing of the left articular recess with moderate narrowing of the left neuroforamen and mild narrowing of the right neuroforamen   No clinical evidence of chord compression   PLAN  - Neurology evaluation   - Neurosurgery evaluation   - Pain management evaluation   - In the meantime start pain regimen: Gabapentin, Tylenol Oxycodone ER and morphine IV for breakthrough pain. Will avoid NSAIDS given GERD and ?CAD     - Bowel regimen   - PT/Rehab     # Microcytic Anemia   - No evidence of melena   - Sent iron studies   - May need outpatient C scope     # HTN   - Continue home meds   - Patient was on aspirin 325 at home unclear reason. Will continue 81 for now (no on record history of CAD)     # DLD  - Continue home meds     # GERD  - Continue home meds     # AAA - Stable being monitored   - Outpatient follow up with Dr. Sosa     GI PPX: Pantoprazole   DVT PPX: Lovenox     DIET: DASH  ACTIVITY:  () Ad Angelia  /  (X) Advance as Tolerated  /  () Bed Rest  /  () Fall Precaution  /  () Seizure precaution    ================= DISPOSITION ==================    Patient to be discharged when condition(s) optimized.            Discharge to: Home when cleared vs SNF            Home services:              Counseled on/Discussed cessation of:  () Risky behaviors  /  () Smoking cessation  /  () Diet  /  () Exercise  /  () Other    ================= CODE STATUS =================                  (X) FULL CODE     |     () DNR     |     () DNI    () Discussion with patient and/or family regarding goals of care ================= ASSESSMENT/PLAN =================  Patient is a 74y old Male who presents with a chief complaint of Currently admitted to medicine with the primary diagnosis of Intractable back pain    # Intractable back pain secondary to multiple level disc disease   Outpatient MRI report is available with patient as well as discs   Outpatient MRI impression:   1-Multilevel degenerative disc disease without evidence of fracture or bone marrow edema   2-L2-L3 demonstrates an extruded disc herniation with focally involving the left paracentral and articular recess regions which demonstrates caudal extension posterior to the L3 vertebral boy. The dis material measures 2.3X0.8 cm with contact and displacement of the intradural left L3 nerve root. There is morederate central spinal canal stenosis with moderate narrowing of the left neuroforamen and mild to moderate narrowing of the righ neuroforamen   3-L3-4 space demonstrates contact of the exiting left L3  nerve root. There is mild to moderate central spinal canal stenosis with mild to moderate narrowing of the left neuroforamen and mild narrowing of the the right neuroforamen   4-L4-5 space demonstrates contact of the intradural L5 nerve roots and the exiting L4 nerve roots. There is facet arthropathy and hypertrophy of the ligamentum flavum more prominent on the left. There is moderate to severe central spinal canal stenosis with moderate to severe narrowing of the right neuroforamen and severe narrowing of the left neuroforamen   5-L5-S1 disc space demonstrates contact and displacement and the intradural left S1 nerve root. There is narrowing of the left articular recess with moderate narrowing of the left neuroforamen and mild narrowing of the right neuroforamen   No clinical evidence of chord compression   PLAN  - Neurology evaluation   - Neurosurgery evaluation   - Pain management evaluation   - In the meantime start pain regimen: Gabapentin, Tylenol Oxycodone ER and morphine IV for breakthrough pain. Will avoid NSAIDS given GERD and ?CAD     - Bowel regimen   - PT/Rehab     # Microcytic Anemia   - No evidence of melena   - Sent iron studies   - May need outpatient C scope     # HTN   - Continue home meds   - Patient was on aspirin 325 at home unclear reason. Will continue 81 for now (no on record history of CAD)     # DLD  - Continue home meds     # GERD  - Continue home meds     # AAA - Stable being monitored   - Outpatient follow up with Dr. Sosa     GI PPX: Pantoprazole   DVT PPX: Lovenox     DIET: DASH  ACTIVITY:  () Ad Angelia  /  (X) Advance as Tolerated  /  () Bed Rest  /  () Fall Precaution  /  () Seizure precaution    ================= DISPOSITION ==================    Patient to be discharged when condition(s) optimized.            Discharge to: Home when cleared vs SNF            Home services:              Counseled on/Discussed cessation of:  () Risky behaviors  /  () Smoking cessation  /  () Diet  /  () Exercise  /  () Other    ================= CODE STATUS =================                  (X) FULL CODE     |     () DNR     |     () DNI    () Discussion with patient and/or family regarding goals of care

## 2020-02-04 NOTE — H&P ADULT - NSHPLABSRESULTS_GEN_ALL_CORE
===================== LABS =====================                        11.7   8.43  )-----------( 293      ( 04 Feb 2020 12:03 )             38.7     02-04    132<L>  |  99  |  20  ----------------------------<  96  5.8<H>   |  18  |  1.1    Ca    9.1      04 Feb 2020 12:03    TPro  7.6  /  Alb  4.3  /  TBili  0.3  /  DBili  x   /  AST  38  /  ALT  13  /  AlkPhos  53  02-04

## 2020-02-04 NOTE — CONSULT NOTE ADULT - ASSESSMENT
IMPRESSION: Rehab of   lumbar spinal stenosis, left lumbar radiculopathy    PRECAUTIONS: [  ] Cardiac  [  ] Respiratory  [  ] Seizures [  ] Contact Isolation  [  ] Droplet Isolation  [  ] Other    Weight Bearing Status:   Neurology and neurosurgery to see; he may not be able to tolerate PT so well. Corticosterod taper could be tried.    RECOMMENDATION:    Out of Bed to Chair     DVT/Decubiti Prophylaxis    REHAB PLAN:     [ x  ] Bedside P/T 3-5 times a week   [   ]   Bedside O/T  2-3 times a week             [   ] No Rehab Therapy Indicated                   [   ]  Speech Therapy   Conditioning/ROM                                    ADL  Bed Mobility                                               Conditioning/ROM  Transfers                                                     Bed Mobility  Sitting /Standing Balance                         Transfers                                        Gait Training                                               Sitting/Standing Balance  Stair Training [   ]Applicable                    Home equipment Eval                                                                        Splinting  [   ] Only      GOALS:   ADL   [ x  ]   Independent                    Transfers  [ x  ] Independent                          Ambulation  [ x  ] Independent     [  x  ] With device                            [   ]  CG                                                         [   ]  CG                                                                  [   ] CG                            [    ] Min A                                                   [   ] Min A                                                              [   ] Min  A          DISCHARGE PLAN:   [   ]  Good candidate for Intensive Rehabilitation/Hospital based-[facility]A [facility]                                             Will tolerate 3hrs Intensive Rehab Daily                                       [  x  ]  Short Term Rehab in Skilled Nursing Facility                                                                VS                                     [ x   ]  Home with Outpatient or VN services                                         [    ]  Possible Candidate for Intensive Hospital based Rehab

## 2020-02-05 DIAGNOSIS — Z87.39 PERSONAL HISTORY OF OTHER DISEASES OF THE MUSCULOSKELETAL SYSTEM AND CONNECTIVE TISSUE: ICD-10-CM

## 2020-02-05 LAB — FERRITIN SERPL-MCNC: 11 NG/ML — LOW (ref 30–400)

## 2020-02-05 PROCEDURE — 99223 1ST HOSP IP/OBS HIGH 75: CPT

## 2020-02-05 RX ORDER — PANTOPRAZOLE SODIUM 20 MG/1
40 TABLET, DELAYED RELEASE ORAL ONCE
Refills: 0 | Status: COMPLETED | OUTPATIENT
Start: 2020-02-05 | End: 2020-02-05

## 2020-02-05 RX ADMIN — OXYCODONE HYDROCHLORIDE 10 MILLIGRAM(S): 5 TABLET ORAL at 21:35

## 2020-02-05 RX ADMIN — OXYCODONE HYDROCHLORIDE 10 MILLIGRAM(S): 5 TABLET ORAL at 17:47

## 2020-02-05 RX ADMIN — PANTOPRAZOLE SODIUM 40 MILLIGRAM(S): 20 TABLET, DELAYED RELEASE ORAL at 05:27

## 2020-02-05 RX ADMIN — MORPHINE SULFATE 2 MILLIGRAM(S): 50 CAPSULE, EXTENDED RELEASE ORAL at 03:23

## 2020-02-05 RX ADMIN — SIMVASTATIN 80 MILLIGRAM(S): 20 TABLET, FILM COATED ORAL at 22:26

## 2020-02-05 RX ADMIN — LISINOPRIL 20 MILLIGRAM(S): 2.5 TABLET ORAL at 16:28

## 2020-02-05 RX ADMIN — MORPHINE SULFATE 2 MILLIGRAM(S): 50 CAPSULE, EXTENDED RELEASE ORAL at 08:20

## 2020-02-05 RX ADMIN — MORPHINE SULFATE 2 MILLIGRAM(S): 50 CAPSULE, EXTENDED RELEASE ORAL at 02:24

## 2020-02-05 RX ADMIN — GABAPENTIN 100 MILLIGRAM(S): 400 CAPSULE ORAL at 05:27

## 2020-02-05 RX ADMIN — OXYCODONE HYDROCHLORIDE 10 MILLIGRAM(S): 5 TABLET ORAL at 12:02

## 2020-02-05 RX ADMIN — OXYCODONE HYDROCHLORIDE 10 MILLIGRAM(S): 5 TABLET ORAL at 16:09

## 2020-02-05 RX ADMIN — OXYCODONE HYDROCHLORIDE 10 MILLIGRAM(S): 5 TABLET ORAL at 11:32

## 2020-02-05 RX ADMIN — OXYCODONE HYDROCHLORIDE 10 MILLIGRAM(S): 5 TABLET ORAL at 20:48

## 2020-02-05 RX ADMIN — GABAPENTIN 100 MILLIGRAM(S): 400 CAPSULE ORAL at 13:15

## 2020-02-05 RX ADMIN — MORPHINE SULFATE 2 MILLIGRAM(S): 50 CAPSULE, EXTENDED RELEASE ORAL at 08:35

## 2020-02-05 RX ADMIN — Medication 325 MILLIGRAM(S): at 11:28

## 2020-02-05 RX ADMIN — LISINOPRIL 20 MILLIGRAM(S): 2.5 TABLET ORAL at 05:27

## 2020-02-05 RX ADMIN — MORPHINE SULFATE 2 MILLIGRAM(S): 50 CAPSULE, EXTENDED RELEASE ORAL at 01:41

## 2020-02-05 RX ADMIN — ENOXAPARIN SODIUM 40 MILLIGRAM(S): 100 INJECTION SUBCUTANEOUS at 11:29

## 2020-02-05 RX ADMIN — Medication 5 MILLIGRAM(S): at 22:26

## 2020-02-05 RX ADMIN — GABAPENTIN 100 MILLIGRAM(S): 400 CAPSULE ORAL at 22:27

## 2020-02-05 RX ADMIN — PANTOPRAZOLE SODIUM 40 MILLIGRAM(S): 20 TABLET, DELAYED RELEASE ORAL at 17:39

## 2020-02-05 RX ADMIN — MORPHINE SULFATE 2 MILLIGRAM(S): 50 CAPSULE, EXTENDED RELEASE ORAL at 17:38

## 2020-02-05 NOTE — PROGRESS NOTE ADULT - ASSESSMENT
74y old Male admitted to medicine with the primary diagnosis of Intractable back pain    #Intractable back pain secondary to multiple level disc disease   -Neurology evaluation pending  -Neurosurgery evaluation pending  -Pain management evaluation pending   -cont Gabapentin, Tylenol Oxycodone ER and morphine IV for breakthrough pain until recs made by pain mgmt   -avoid NSAIDS given GERD and ?CAD     -cont bowel regimen   -PT/Rehab eval noted rec STR/SNF vs. home with outpt PT or VNS    #Microcytic Anemia   DELLA  no evidence of bleeding or bruising   -will need outpt GI for colonoscopy  -cont iron supplementation   # HTN   -Continue home meds   -Patient was on aspirin 325 at home unclear reason. Will continue 81 for now (no on record history of CAD)     #HLD  -Continue home meds     #GERD  -Continue ppi    #AAA - Stable being monitored   -Outpatient follow up with Dr. Sosa     #HCM  DVT ppx: lovenox subq  DASH diet    Progress Note Handoff  Pending: neuro, neurosx, and PT  Patient/Family discussion: POC discussed with pt reassured him that the appropriate consults were placed last night and pt will be seen   Disposition: STR/SNF vs. home with home PT/VNS however, acute at this time

## 2020-02-05 NOTE — PROGRESS NOTE ADULT - SUBJECTIVE AND OBJECTIVE BOX
PACOUTMEDARDO RIDDLE  74y, Male  Allergy: No Known Allergies  Hospital Day: 1d      Patient was seen and examined at bedside. continues to endorse unchanged pain which he states is something he has been chronically dealing with with no resolution       VITALS:  T(F): 97.8 (02-05-20 @ 08:30), Max: 98 (02-04-20 @ 16:48)  HR: 65 (02-05-20 @ 08:30)  BP: 158/72 (02-05-20 @ 08:30) (145/71 - 158/72)  RR: 18 (02-05-20 @ 08:30)  SpO2: 98% (02-05-20 @ 08:30)    PHYSICAL EXAM:  GENERAL: well developed well nourished in no acute distress  NECK: No evidence of swelling no palpable lymphadenopathy  CHEST/LUNG: clear to ausculation bilaterally no wheezes, rales, or rhonchi   HEART/VASC: RRR, No murmurs, rubs, or gallops appreciated, 2+ equal bilateral radial pulse  ABDOMEN: Soft, non tender non distended +bowel sounds in all quadrants, no palpable organomegaly   EXTREMITIES:  No clubbing and range of motion intact     TESTS & MEASUREMENTS:  Weight (Kg): 89.8 (02-04-20 @ 10:48)                            11.7   8.43  )-----------( 293      ( 04 Feb 2020 12:03 )             38.7       02-04    134<L>  |  100  |  20  ----------------------------<  124<H>  5.3<H>   |  21  |  1.1    Ca    9.2      04 Feb 2020 16:12    TPro  7.6  /  Alb  4.3  /  TBili  0.3  /  DBili  x   /  AST  38  /  ALT  13  /  AlkPhos  53  02-04    LIVER FUNCTIONS - ( 04 Feb 2020 12:03 )  Alb: 4.3 g/dL / Pro: 7.6 g/dL / ALK PHOS: 53 U/L / ALT: 13 U/L / AST: 38 U/L / GGT: x               MEDICATIONS:  MEDICATIONS  (STANDING):  enoxaparin Injectable 40 milliGRAM(s) SubCutaneous daily  ferrous    sulfate 325 milliGRAM(s) Oral daily  gabapentin 100 milliGRAM(s) Oral three times a day  lisinopril 20 milliGRAM(s) Oral every 12 hours  melatonin 5 milliGRAM(s) Oral at bedtime  pantoprazole    Tablet 40 milliGRAM(s) Oral before breakfast  simvastatin Oral Tab/Cap - Peds 80 milliGRAM(s) Oral at bedtime    MEDICATIONS  (PRN):  acetaminophen   Tablet .. 650 milliGRAM(s) Oral every 4 hours PRN Mild Pain (1 - 3)  morphine  - Injectable 2 milliGRAM(s) IV Push every 4 hours PRN Severe Pain (7 - 10)  oxyCODONE    IR 10 milliGRAM(s) Oral every 4 hours PRN Severe Pain (7 - 10)  polyethylene glycol 3350 17 Gram(s) Oral daily PRN Constipation  senna 2 Tablet(s) Oral at bedtime PRN Constipation  traMADol 50 milliGRAM(s) Oral every 6 hours PRN Moderate Pain (4 - 6)

## 2020-02-05 NOTE — CONSULT NOTE ADULT - ASSESSMENT
REVIEW OF SYSTEMS    General:	NAD   Skin/Breast:	Neg  Ophthalmologic:	Neg  ENMT: Neg	  Respiratory and Thorax: Neg	  Cardiovascular:	Neg  Gastrointestinal:	Neg  Genitourinary:	Neg  Musculoskeletal:	Neg  Neurological:	Neg  Psychiatric:	Neg  Hematology/Lymphatics:	Neg  Endocrine:	Neg        PHYSICAL EXAM:    GENERAL: NAD, well-groomed, well-developed  HEAD:  Atraumatic, Normocephalic  EYES: EOMI, PERRLA, conjunctiva and sclera clear  ENMT: No tonsillar erythema, exudates, or enlargement; Moist mucous membranes, Good dentition, No lesions  NECK: Supple, No JVD, Normal thyroid  NERVOUS SYSTEM:  Alert & Oriented X3, Good concentration; Motor Strength 5/5 B/L upper and lower extremities; DTRs 2+ intact and symmetric  CHEST/LUNG: Clear to percussion bilaterally; No rales, rhonchi, wheezing, or rubs  HEART: Regular rate and rhythm; No murmurs, rubs, or gallops  ABDOMEN: Soft, Nontender, Nondistended; Bowel sounds present  EXTREMITIES:  2+ Peripheral Pulses, No clubbing, cyanosis, or edema  LYMPH: No lymphadenopathy noted  SKIN: No rashes or lesions      Patient lying in bed. Patient states he is unable to walk secondary to pain. Patient has been radiating to the left leg to the left knee since Dec 2019. Patient visited a chiropractor with no positive results. Patient had a recent MRI and showed me the report. Patient with extensive degenerative changes. Patient states he doesn't know if his primary provider knows of the results. Patient states he got the results and came to the ED. States he did not go to his provider because his provider does nothing for him. Pain sharp, constant, radiating to right leg.

## 2020-02-05 NOTE — CONSULT NOTE ADULT - PROBLEM SELECTOR RECOMMENDATION 9
Change acetaminophen   Tablet .. 650 milliGRAM(s) Oral every 4 hours standing  Change gabapentin 300 milliGRAM(s) Oral three times a day  DC morphine  - Injectable 2 milliGRAM(s) IV Push every 4 hours PRN  Con't oxyCODONE    IR 10 milliGRAM(s) Oral every 4 hours PRN  DC traMADol 50 milliGRAM(s) Oral every 6 hours PRN  Start Warm compress to the left lower back Q1hr [id number]

## 2020-02-05 NOTE — CONSULT NOTE ADULT - SUBJECTIVE AND OBJECTIVE BOX
Chief Complaint:       This is a 74y Male HPI:  [74 year old male]    CC: Back Pain    PMH: Multiple herniated discs, HTN, HLD, GERD, AAA     History of Present Illness goes back to the past month when the patient started having back pain that has been chronic but with recent worsening. He was seeing neurology, rehab and pain management with little improvement. This morning the pain was so bad upon walking that he called EMS and presented to the ED. He had an MRI done which showed multiple levels of degenerative disease and disc herniations. He denies numbness, weakness, saddle anesthesia, urinary retention of fecal incontinence.     In the ED, vitals were stable. He was given decadron, ketorolac and oxycodone (04 Feb 2020 14:54)      Allergies    No Known Allergies    Intolerances        PAST MEDICAL & SURGICAL HISTORY:  History of degenerative disc disease  Elevated lipids  Abdominal aortic aneurysm  HTN (hypertension)        SOCIAL HISTORY:  Denies Smoking, Alcohol, or Drug Use    No Known Allergies      PAIN MEDICATIONS:  acetaminophen   Tablet .. 650 milliGRAM(s) Oral every 4 hours PRN  gabapentin 100 milliGRAM(s) Oral three times a day  melatonin 5 milliGRAM(s) Oral at bedtime  morphine  - Injectable 2 milliGRAM(s) IV Push every 4 hours PRN  oxyCODONE    IR 10 milliGRAM(s) Oral every 4 hours PRN  traMADol 50 milliGRAM(s) Oral every 6 hours PRN    Heme:  enoxaparin Injectable 40 milliGRAM(s) SubCutaneous daily    Antibiotics:    Cardiovascular:  lisinopril 20 milliGRAM(s) Oral every 12 hours    GI:  pantoprazole    Tablet 40 milliGRAM(s) Oral before breakfast  polyethylene glycol 3350 17 Gram(s) Oral daily PRN  senna 2 Tablet(s) Oral at bedtime PRN    Endocrine:  simvastatin Oral Tab/Cap - Peds 80 milliGRAM(s) Oral at bedtime    All Other Medications:  ferrous    sulfate 325 milliGRAM(s) Oral daily      Vital Signs Last 24 Hrs  T(C): 36.6 (05 Feb 2020 08:30), Max: 36.7 (04 Feb 2020 16:48)  T(F): 97.8 (05 Feb 2020 08:30), Max: 98 (04 Feb 2020 16:48)  HR: 65 (05 Feb 2020 08:30) (61 - 77)  BP: 158/72 (05 Feb 2020 08:30) (145/71 - 158/72)  BP(mean): --  RR: 18 (05 Feb 2020 08:30) (18 - 18)  SpO2: 98% (05 Feb 2020 08:30) (96% - 98%)        LABS:                          11.7   8.43  )-----------( 293      ( 04 Feb 2020 12:03 )             38.7     02-04    134<L>  |  100  |  20  ----------------------------<  124<H>  5.3<H>   |  21  |  1.1    Ca    9.2      04 Feb 2020 16:12    TPro  7.6  /  Alb  4.3  /  TBili  0.3  /  DBili  x   /  AST  38  /  ALT  13  /  AlkPhos  53  02-04          RADIOLOGY:    Drug Screen:        [ ]  [facility]  Reviewed on 2/5/20, 11:20A  Patient Name:	John Spence	YOB: 1945  Address:	[address] [place] [address]	Sex:	Male  Rx Written	Rx Dispensed	Drug	Quantity	Days Supply	Prescriber Name  01/25/2020	01/26/2020	tramadol hcl 50 mg tablet	9	3	Tabitha Sears S  01/07/2020	01/07/2020	tramadol hcl 50 mg tablet	10	3	Tabitha Sears S  11/15/2019	11/15/2019	hydrocodone-acetaminophen 5-325 mg tablet	10	2	Russell Pagan DDS Chief Complaint:       74y male with chronic back pain and left side lower back pain starting in Dec 2019. PMH: Multiple herniated discs/degenerative changes, HTN, HLD, GERD, AAA         Allergies    No Known Allergies    Intolerances        PAST MEDICAL & SURGICAL HISTORY:  History of degenerative disc disease  Elevated lipids  Abdominal aortic aneurysm  HTN (hypertension)        SOCIAL HISTORY:  Denies Smoking, Alcohol, or Drug Use    No Known Allergies      PAIN MEDICATIONS:  acetaminophen   Tablet .. 650 milliGRAM(s) Oral every 4 hours PRN  gabapentin 100 milliGRAM(s) Oral three times a day  melatonin 5 milliGRAM(s) Oral at bedtime  morphine  - Injectable 2 milliGRAM(s) IV Push every 4 hours PRN  oxyCODONE    IR 10 milliGRAM(s) Oral every 4 hours PRN  traMADol 50 milliGRAM(s) Oral every 6 hours PRN    Heme:  enoxaparin Injectable 40 milliGRAM(s) SubCutaneous daily    Antibiotics:    Cardiovascular:  lisinopril 20 milliGRAM(s) Oral every 12 hours    GI:  pantoprazole    Tablet 40 milliGRAM(s) Oral before breakfast  polyethylene glycol 3350 17 Gram(s) Oral daily PRN  senna 2 Tablet(s) Oral at bedtime PRN    Endocrine:  simvastatin Oral Tab/Cap - Peds 80 milliGRAM(s) Oral at bedtime    All Other Medications:  ferrous    sulfate 325 milliGRAM(s) Oral daily      Vital Signs Last 24 Hrs  T(C): 36.6 (05 Feb 2020 08:30), Max: 36.7 (04 Feb 2020 16:48)  T(F): 97.8 (05 Feb 2020 08:30), Max: 98 (04 Feb 2020 16:48)  HR: 65 (05 Feb 2020 08:30) (61 - 77)  BP: 158/72 (05 Feb 2020 08:30) (145/71 - 158/72)  BP(mean): --  RR: 18 (05 Feb 2020 08:30) (18 - 18)  SpO2: 98% (05 Feb 2020 08:30) (96% - 98%)        LABS:                          11.7   8.43  )-----------( 293      ( 04 Feb 2020 12:03 )             38.7     02-04    134<L>  |  100  |  20  ----------------------------<  124<H>  5.3<H>   |  21  |  1.1    Ca    9.2      04 Feb 2020 16:12    TPro  7.6  /  Alb  4.3  /  TBili  0.3  /  DBili  x   /  AST  38  /  ALT  13  /  AlkPhos  53  02-04          RADIOLOGY:  EXAM:  XR CHEST PORTABLE ROUTINE 1V            PROCEDURE DATE:  02/04/2020            INTERPRETATION:  Clinical History / Reason for exam: Cough.    Comparison : Chest radiograph None.    Technique/Positioning: Frontal and lateral.    Findings:    Support devices: None.    Cardiac/mediastinum/hilum: The aorta is ectatic.    Lung parenchyma/Pleura: Within normal limits.    Skeleton/soft tissues: Unremarkable.    Impression:      No radiographic evidence of acute cardiopulmonary disease.      SAMMY CARBAJAL M.D., ATTENDING RADIOLOGIST  This document has been electronically signed. Feb 4 2020  4:46PM        Drug Screen:        [ ]  [facility]  Reviewed on 2/5/20, 11:20A  Patient Name:	John Spence	YOB: 1945  Address:	[address] [place] [address]	Sex:	Male  Rx Written	Rx Dispensed	Drug	Quantity	Days Supply	Prescriber Name  01/25/2020	01/26/2020	tramadol hcl 50 mg tablet	9	3	Tabitha Sears S  01/07/2020	01/07/2020	tramadol hcl 50 mg tablet	10	3	Tabitha Sears S  11/15/2019	11/15/2019	hydrocodone-acetaminophen 5-325 mg tablet	10	2	Russell Pagan DDS

## 2020-02-06 LAB
HCV AB S/CO SERPL IA: 0.38 S/CO — SIGNIFICANT CHANGE UP (ref 0–0.99)
HCV AB SERPL-IMP: SIGNIFICANT CHANGE UP
OSMOLALITY UR: 637 MOS/KG — SIGNIFICANT CHANGE UP (ref 50–1400)
SODIUM UR-SCNC: 122 MMOL/L — SIGNIFICANT CHANGE UP

## 2020-02-06 PROCEDURE — 99233 SBSQ HOSP IP/OBS HIGH 50: CPT

## 2020-02-06 RX ORDER — HYDROCHLOROTHIAZIDE 25 MG
12.5 TABLET ORAL DAILY
Refills: 0 | Status: DISCONTINUED | OUTPATIENT
Start: 2020-02-06 | End: 2020-02-11

## 2020-02-06 RX ADMIN — TRAMADOL HYDROCHLORIDE 50 MILLIGRAM(S): 50 TABLET ORAL at 22:20

## 2020-02-06 RX ADMIN — ENOXAPARIN SODIUM 40 MILLIGRAM(S): 100 INJECTION SUBCUTANEOUS at 12:06

## 2020-02-06 RX ADMIN — Medication 325 MILLIGRAM(S): at 12:07

## 2020-02-06 RX ADMIN — PANTOPRAZOLE SODIUM 40 MILLIGRAM(S): 20 TABLET, DELAYED RELEASE ORAL at 05:39

## 2020-02-06 RX ADMIN — OXYCODONE HYDROCHLORIDE 10 MILLIGRAM(S): 5 TABLET ORAL at 21:40

## 2020-02-06 RX ADMIN — GABAPENTIN 100 MILLIGRAM(S): 400 CAPSULE ORAL at 21:40

## 2020-02-06 RX ADMIN — MORPHINE SULFATE 2 MILLIGRAM(S): 50 CAPSULE, EXTENDED RELEASE ORAL at 00:35

## 2020-02-06 RX ADMIN — OXYCODONE HYDROCHLORIDE 10 MILLIGRAM(S): 5 TABLET ORAL at 22:46

## 2020-02-06 RX ADMIN — OXYCODONE HYDROCHLORIDE 10 MILLIGRAM(S): 5 TABLET ORAL at 17:39

## 2020-02-06 RX ADMIN — OXYCODONE HYDROCHLORIDE 10 MILLIGRAM(S): 5 TABLET ORAL at 13:06

## 2020-02-06 RX ADMIN — MORPHINE SULFATE 2 MILLIGRAM(S): 50 CAPSULE, EXTENDED RELEASE ORAL at 06:04

## 2020-02-06 RX ADMIN — OXYCODONE HYDROCHLORIDE 10 MILLIGRAM(S): 5 TABLET ORAL at 09:00

## 2020-02-06 RX ADMIN — GABAPENTIN 100 MILLIGRAM(S): 400 CAPSULE ORAL at 14:18

## 2020-02-06 RX ADMIN — MORPHINE SULFATE 2 MILLIGRAM(S): 50 CAPSULE, EXTENDED RELEASE ORAL at 06:15

## 2020-02-06 RX ADMIN — OXYCODONE HYDROCHLORIDE 10 MILLIGRAM(S): 5 TABLET ORAL at 22:14

## 2020-02-06 RX ADMIN — OXYCODONE HYDROCHLORIDE 10 MILLIGRAM(S): 5 TABLET ORAL at 09:50

## 2020-02-06 RX ADMIN — GABAPENTIN 100 MILLIGRAM(S): 400 CAPSULE ORAL at 05:39

## 2020-02-06 RX ADMIN — OXYCODONE HYDROCHLORIDE 10 MILLIGRAM(S): 5 TABLET ORAL at 02:28

## 2020-02-06 RX ADMIN — TRAMADOL HYDROCHLORIDE 50 MILLIGRAM(S): 50 TABLET ORAL at 03:42

## 2020-02-06 RX ADMIN — SIMVASTATIN 80 MILLIGRAM(S): 20 TABLET, FILM COATED ORAL at 21:40

## 2020-02-06 RX ADMIN — LISINOPRIL 20 MILLIGRAM(S): 2.5 TABLET ORAL at 05:39

## 2020-02-06 RX ADMIN — OXYCODONE HYDROCHLORIDE 10 MILLIGRAM(S): 5 TABLET ORAL at 14:00

## 2020-02-06 RX ADMIN — LISINOPRIL 20 MILLIGRAM(S): 2.5 TABLET ORAL at 17:36

## 2020-02-06 RX ADMIN — Medication 5 MILLIGRAM(S): at 21:40

## 2020-02-06 RX ADMIN — TRAMADOL HYDROCHLORIDE 50 MILLIGRAM(S): 50 TABLET ORAL at 04:30

## 2020-02-06 RX ADMIN — TRAMADOL HYDROCHLORIDE 50 MILLIGRAM(S): 50 TABLET ORAL at 14:50

## 2020-02-06 NOTE — CONSULT NOTE ADULT - ATTENDING COMMENTS
Patient seen and examined. Pt with new left leg radiculopathy consistent with L3 pattern.     Imaging shows left L2-3 disc herniation with caudal extrusion and impingement of lateral recess.     I advised patient re: management options which include pain control, GREG, and PT vs surgical intervention.  Pt wishes to proceed with surgical intervention given significant pain and failure of conservative treatments in past for similar symptoms.     I discussed with patient potential risks of surgery which include, bleeding, infection, CSF leak, disc reherniation, failure to improve or worsening of symptoms and the need for further procedures.  He and family understand and they wish to proceed.

## 2020-02-06 NOTE — PROGRESS NOTE ADULT - ASSESSMENT
74y old Male admitted to medicine with the primary diagnosis of Intractable back pain    # Intractable back pain secondary to multiple level disc disease; L2-3 disc james; spinal stenosis; b/l (L>R) [facility]aminal stenosis at [facility]  Neuro - P  NSx (Dr Blackman) - in progress  Pain mngmt noted  perhaps neuro IR could offer epidural injections? (to d/w team)  need rehab eval    # pain reg per pain mngmt  Tylenol 650mg po q6  gabapentin 300 milliGRAM(s) Oral three times a day  oxyCODONE IR 10 milliGRAM(s) Oral every 4 hours PRN  DC morphine; DC traMADol   Start Warm compress to the left lower back Q1hr [id number].  avoid NSAIDS given GERD and anemia  cont bowel regimen     # Microcytic Anemia 2/2 DELLA - unsure of cause  no evidence of bleeding or bruising   will need outpt GI eval for scopes  cont iron supplementation   hold asa    # HTN   Continue HCTZ and ACEI (need to recheck K+)    # HLD  Continue zocor    # GERD  Continue ppi    # AAA - Stable being monitored   Outpatient follow up with Dr. Sosa     # DVT ppx: lovenox subq    Disposition: f/u neuro and Nsx; consider neuro IR eval; tx pain w/ pain mngmt; rehab eval; outpt w/u for DELLA; BMP was hemolyzed - repeat it

## 2020-02-06 NOTE — PROGRESS NOTE ADULT - SUBJECTIVE AND OBJECTIVE BOX
MEDARDO GUTIERREZ  74y  Male  ***My note supersedes ALL resident notes that I sign.  My corrections for their notes are in my note.***    I can be reached directly on [email or web address]. My office number is [phone]. My personal cell number is [phone].    INTERVAL EVENTS: Here for f/u of back pain. Pain has been present since end of December and started in [place] while on vacation.  Pt has been having back pain and chiropractor has been ineffective. 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  However, now he can barely stand for a minute. He feels much better lying in bed.  Pain in back radiates down left leg only.  He feels left leg weakens when he stands.    T(F): 98.3 (02-06-20 @ 13:26), Max: 98.3 (02-06-20 @ 13:26)  HR: 85 (02-06-20 @ 13:26) (60 - 85)  BP: 113/68 (02-06-20 @ 13:26) (110/62 - 156/91)  RR: 18 (02-06-20 @ 13:26) (18 - 18)  SpO2: 98% (02-06-20 @ 09:25) (98% - 98%)    Gen: NAD; fairly comfortable and mobile in bed  HEENT: WNL  Neck: no JVD, no Nodes  lungs: clr  hrt: s1 s2 rrr  abd soft nt/nd  ext no edema, no c/c  neuro: aa ox3, CN intact, lt patella reflexes are 1+ compare to rt of 2+; pt has excellent ROM of legs at hips, knees and ankles, no foot drop    LABS:  Complete Blood Count + Automated Diff (02.04.20 @ 12:03)    WBC Count: 8.43 K/uL    RBC Count: 5.07 M/uL    Hemoglobin: 11.7 g/dL    Hematocrit: 38.7 %    Mean Cell Volume: 76.3 fL    Mean Cell Hemoglobin: 23.1 pg    Mean Cell Hemoglobin Conc: 30.2 g/dL    Red Cell Distrib Width: 17.5 %    Platelet Count - Automated: 293 K/uL    Comprehensive Metabolic Panel (02.04.20 @ 12:03)    Sodium, Serum: 132 mmol/L    Potassium, Serum: 5.8: Hemolyzed. Interpret with caution mmol/L    Chloride, Serum: 99 mmol/L    Carbon Dioxide, Serum: 18 mmol/L    Anion Gap, Serum: 15 mmol/L    Blood Urea Nitrogen, Serum: 20 mg/dL    Creatinine, Serum: 1.1 mg/dL    Glucose, Serum: 96 mg/dL    Calcium, Total Serum: 9.1 mg/dL    Protein Total, Serum: 7.6 g/dL    Albumin, Serum: 4.3 g/dL    Bilirubin Total, Serum: 0.3 mg/dL    Alkaline Phosphatase, Serum: 53 U/L    Aspartate Aminotransferase (AST/SGOT): 38: Hemolyzed. Interpret with caution U/L    Alanine Aminotransferase (ALT/SGPT): 13: Hemolyzed. Interpret with caution U/L    eGFR if Non : 66    Iron with Total Binding Capacity (02.04.20 @ 16:12)    % Saturation, Iron: 6 %    Iron - Total Binding Capacity.: 423 ug/dL    Iron Total, Serum: 24 ug/dL    Unsaturated Iron Binding Capacity: 399 ug/dL    Ferritin, Serum: 11 ng/mL (02.04.20 @ 16:12)    RADIOLOGY & ADDITIONAL TESTS:  < from: Xray Chest 1 View- PORTABLE-Routine (02.04.20 @ 15:16) >  Impression:      No radiographic evidence of acute cardiopulmonary disease.    < end of copied text >    Outpatient MRI impression:   1-Multilevel degenerative disc disease without evidence of fracture or bone marrow edema     2-L2-L3 demonstrates an extruded disc herniation with focally involving the left paracentral and articular recess regions which demonstrates caudal extension posterior to the L3 vertebral body. The disc material measures 2.3X0.8 cm with contact and displacement of the intradural left L3 nerve root. There is moderate central spinal canal stenosis with moderate narrowing of the left neuroforamen and mild to moderate narrowing of the right neuroforamen.    3-L3-4 space demonstrates contact of the exiting left L3  nerve root. There is mild to moderate central spinal canal stenosis with mild to moderate narrowing of the left neuroforamen and mild narrowing of the the right neuroforamen     4-L4-5 space demonstrates contact of the intradural L5 nerve roots and the exiting L4 nerve roots. There is facet arthropathy and hypertrophy of the ligamentum flavum more prominent on the left. There is moderate to severe central spinal canal stenosis with moderate to severe narrowing of the right neuroforamen and severe narrowing of the left neuroforamen     5-L5-S1 disc space demonstrates contact and displacement and the intradural left S1 nerve root. There is narrowing of the left articular recess with moderate narrowing of the left neuroforamen and mild narrowing of the right neuroforamen     No clinical evidence of cord compression     MEDICATIONS:    acetaminophen   Tablet .. 650 milliGRAM(s) Oral every 4 hours PRN  aluminum hydroxide/magnesium hydroxide/simethicone Suspension 30 milliLiter(s) Oral every 4 hours PRN  enoxaparin Injectable 40 milliGRAM(s) SubCutaneous daily  ferrous    sulfate 325 milliGRAM(s) Oral daily  gabapentin 100 milliGRAM(s) Oral three times a day  hydrochlorothiazide 12.5 milliGRAM(s) Oral daily  lisinopril 20 milliGRAM(s) Oral every 12 hours  melatonin 5 milliGRAM(s) Oral at bedtime  oxyCODONE    IR 10 milliGRAM(s) Oral every 4 hours PRN  pantoprazole    Tablet 40 milliGRAM(s) Oral before breakfast  polyethylene glycol 3350 17 Gram(s) Oral daily PRN  senna 2 Tablet(s) Oral at bedtime PRN  simvastatin Oral Tab/Cap - Peds 80 milliGRAM(s) Oral at bedtime

## 2020-02-06 NOTE — PHYSICAL THERAPY INITIAL EVALUATION ADULT - CRITERIA FOR SKILLED THERAPEUTIC INTERVENTIONS
therapy frequency/anticipated discharge recommendation/predicted duration of therapy intervention/functional limitations in following categories/impairments found/rehab potential/anticipated equipment needs at discharge

## 2020-02-06 NOTE — CONSULT NOTE ADULT - SUBJECTIVE AND OBJECTIVE BOX
HISTORY OF PRESENT ILLNESS: 73 Y/O male with chronic left sciatica for a few years, states his back pain has been getting worse since December and has been seeing a chiropractor. MRI done as O/P, Patient complains of LBP with left lateral thigh pain coming around front of leg to mid tibia with mild numbness, no weakness, pain 7/10 when standing and 4/10 when in bed.    PAST MEDICAL & SURGICAL HISTORY:  History of degenerative disc disease  Elevated lipids  Abdominal aortic aneurysm  HTN (hypertension)    FAMILY HISTORY:  Family history of degenerative disc disease: Sister and mother      SOCIAL HISTORY:  Tobacco Use:  EtOH use:   Substance:    Allergies    No Known Allergies    Intolerances    MEDICATIONS:  Antibiotics:    Neuro:  acetaminophen   Tablet .. 650 milliGRAM(s) Oral every 4 hours PRN  gabapentin 100 milliGRAM(s) Oral three times a day  melatonin 5 milliGRAM(s) Oral at bedtime  oxyCODONE    IR 10 milliGRAM(s) Oral every 4 hours PRN  traMADol 50 milliGRAM(s) Oral every 6 hours PRN    Anticoagulation:  enoxaparin Injectable 40 milliGRAM(s) SubCutaneous daily    OTHER:  aluminum hydroxide/magnesium hydroxide/simethicone Suspension 30 milliLiter(s) Oral every 4 hours PRN  hydrochlorothiazide 12.5 milliGRAM(s) Oral daily  lisinopril 20 milliGRAM(s) Oral every 12 hours  pantoprazole    Tablet 40 milliGRAM(s) Oral before breakfast  polyethylene glycol 3350 17 Gram(s) Oral daily PRN  senna 2 Tablet(s) Oral at bedtime PRN  simvastatin Oral Tab/Cap - Peds 80 milliGRAM(s) Oral at bedtime    IVF:  ferrous    sulfate 325 milliGRAM(s) Oral daily      Vital Signs Last 24 Hrs  T(C): 36.1 (06 Feb 2020 09:25), Max: 36.4 (05 Feb 2020 16:00)  T(F): 97 (06 Feb 2020 09:25), Max: 97.6 (05 Feb 2020 16:00)  HR: 67 (06 Feb 2020 09:25) (60 - 67)  BP: 147/79 (06 Feb 2020 09:25) (110/62 - 156/91)  BP(mean): --  RR: 18 (06 Feb 2020 09:25) (18 - 18)  SpO2: 98% (06 Feb 2020 09:25) (98% - 98%)    PHYSICAL EXAM:  Neurological: alert x 3, NAD, resting in bed, no pain with straight leg raise, numbness left lateral thigh and anterior left leg to mid tibia, no gross weakness, no  or GI complaints    LABS:    02-04    134<L>  |  100  |  20  ----------------------------<  124<H>  5.3<H>   |  21  |  1.1    Ca    9.2      04 Feb 2020 16:12          CULTURES:      RADIOLOGY & ADDITIONAL STUDIES: MRI done as O/P, to be down loaded, report - Multilevel degenerative disc disease, no fx or bone marrow edema    Assessment: LBP with LLE radiculopathy      Plan: Will review MRI with attending, consider analgesics, PT, pain management, muscle relaxants

## 2020-02-06 NOTE — PROGRESS NOTE ADULT - SUBJECTIVE AND OBJECTIVE BOX
MEDARDO GUTIERREZ 74y Male  MRN#: [id number]     SUBJECTIVE  Patient is a 74y old Male who presents with a chief complaint of Back Pain (05 Feb 2020 16:29)  Today is hospital day 2d, and this morning he is lying in bed without distress.   complains of back pain  able to ambnulate to bathroom with assistance  no ches tpaion no sob  no f/c//n  no abdominal pain  no diarrhea  no constipation      OBJECTIVE  PAST MEDICAL & SURGICAL HISTORY  History of degenerative disc disease  Elevated lipids  Abdominal aortic aneurysm  HTN (hypertension)    ALLERGIES:  No Known Allergies    MEDICATIONS:  STANDING MEDICATIONS  enoxaparin Injectable 40 milliGRAM(s) SubCutaneous daily  ferrous    sulfate 325 milliGRAM(s) Oral daily  gabapentin 100 milliGRAM(s) Oral three times a day  hydrochlorothiazide 12.5 milliGRAM(s) Oral daily  lisinopril 20 milliGRAM(s) Oral every 12 hours  melatonin 5 milliGRAM(s) Oral at bedtime  pantoprazole    Tablet 40 milliGRAM(s) Oral before breakfast  simvastatin Oral Tab/Cap - Peds 80 milliGRAM(s) Oral at bedtime    PRN MEDICATIONS  acetaminophen   Tablet .. 650 milliGRAM(s) Oral every 4 hours PRN  aluminum hydroxide/magnesium hydroxide/simethicone Suspension 30 milliLiter(s) Oral every 4 hours PRN  oxyCODONE    IR 10 milliGRAM(s) Oral every 4 hours PRN  polyethylene glycol 3350 17 Gram(s) Oral daily PRN  senna 2 Tablet(s) Oral at bedtime PRN  traMADol 50 milliGRAM(s) Oral every 6 hours PRN    HOME MEDICATIONS  Home Medications:  aspirin 325 mg oral delayed release tablet: 1 tab(s) orally once a day (04 Feb 2020 15:52)  gabapentin 100 mg oral capsule: 1 cap(s) orally 3 times a day (04 Feb 2020 15:51)  ibuprofen 400 mg oral tablet: 1 tab(s) orally every 6 hours (04 Feb 2020 15:51)  Lexapro 20 mg oral tablet: 1 tab(s) orally once a day (04 Feb 2020 15:52)  lisinopril 20 mg oral tablet: 1 tab(s) orally every 12 hours (04 Feb 2020 15:52)  pantoprazole 40 mg oral delayed release tablet: 1 tab(s) orally once a day (04 Feb 2020 15:51)  simvastatin 80 mg oral tablet: 1 tab(s) orally once a day (at bedtime) (04 Feb 2020 15:52)  traMADol 50 mg oral tablet: 1 tab(s) orally every 4 hours (04 Feb 2020 15:51)      VITAL SIGNS: Last 24 Hours  T(C): 36.1 (06 Feb 2020 09:25), Max: 36.4 (05 Feb 2020 16:00)  T(F): 97 (06 Feb 2020 09:25), Max: 97.6 (05 Feb 2020 16:00)  HR: 67 (06 Feb 2020 09:25) (60 - 67)  BP: 147/79 (06 Feb 2020 09:25) (110/62 - 156/91)  BP(mean): --  RR: 18 (06 Feb 2020 09:25) (18 - 18)  SpO2: 98% (06 Feb 2020 09:25) (98% - 98%)      LABS:                        11.7   8.43  )-----------( 293      ( 04 Feb 2020 12:03 )             38.7     02-04    134<L>  |  100  |  20  ----------------------------<  124<H>  5.3<H>   |  21  |  1.1    Ca    9.2      04 Feb 2020 16:12    TPro  7.6  /  Alb  4.3  /  TBili  0.3  /  DBili  x   /  AST  38  /  ALT  13  /  AlkPhos  53  02-04    LIVER FUNCTIONS - ( 04 Feb 2020 12:03 )  Alb: 4.3 g/dL / Pro: 7.6 g/dL / ALK PHOS: 53 U/L / ALT: 13 U/L / AST: 38 U/L / GGT: x                         CAPILLARY BLOOD GLUCOSE          RADIOLOGY:    PHYSICAL EXAM:  PHYSICAL EXAM:  GENERAL: NAD, AAO x 4, 74y M  HEAD:  Atraumatic, Normocephalic  EYES: EOMI, conjunctiva clear and sclera white  NECK: Supple, No JVD  CHEST/LUNG: Clear to auscultation bilaterally; No wheeze; No crackles; No accessory muscles used  HEART: Regular rate and rhythm; No murmurs;   ABDOMEN: Soft, Nontender, Nondistended; Bowel sounds present; No guarding  EXTREMITIES: No cyanosis or edema, pain radiating to l leg  NEUROLOGY: non-focal    ADMISSION SUMMARY  Patient is a 74y old Male who presents with a chief complaint of Back Pain (05 Feb 2020 16:29)

## 2020-02-06 NOTE — PHYSICAL THERAPY INITIAL EVALUATION ADULT - GENERAL OBSERVATIONS, REHAB EVAL
Semifowler in bed c/o severe LBP c sitting and standing. Complaining that he has been here for 3 days and neurosurgery has not been in to see him yet and develop a POC.

## 2020-02-06 NOTE — PHYSICAL THERAPY INITIAL EVALUATION ADULT - PLANNED THERAPY INTERVENTIONS, PT EVAL
balance training/manual therapy techniques/stretching/gait training/neuromuscular re-education/postural re-education/transfer training/motor coordination training/ROM

## 2020-02-06 NOTE — PROGRESS NOTE ADULT - ASSESSMENT
Patient is a 74y old Male who presents with a chief complaint of Currently admitted to medicine with the primary diagnosis of Intractable back pain    # Intractable back pain secondary to multiple level disc disease   Outpatient MRI report is available with patient as well as discs   Outpatient MRI impression:   1-Multilevel degenerative disc disease without evidence of fracture or bone marrow edema   2-L2-L3 demonstrates an extruded disc herniation with focally involving the left paracentral and articular recess regions which demonstrates caudal extension posterior to the L3 vertebral boy. The dis material measures 2.3X0.8 cm with contact and displacement of the intradural left L3 nerve root. There is morederate central spinal canal stenosis with moderate narrowing of the left neuroforamen and mild to moderate narrowing of the righ neuroforamen   3-L3-4 space demonstrates contact of the exiting left L3  nerve root. There is mild to moderate central spinal canal stenosis with mild to moderate narrowing of the left neuroforamen and mild narrowing of the the right neuroforamen   4-L4-5 space demonstrates contact of the intradural L5 nerve roots and the exiting L4 nerve roots. There is facet arthropathy and hypertrophy of the ligamentum flavum more prominent on the left. There is moderate to severe central spinal canal stenosis with moderate to severe narrowing of the right neuroforamen and severe narrowing of the left neuroforamen   5-L5-S1 disc space demonstrates contact and displacement and the intradural left S1 nerve root. There is narrowing of the left articular recess with moderate narrowing of the left neuroforamen and mild narrowing of the right neuroforamen   No clinical evidence of cord compression     PLAN  - Neurology evaluation patient follows with dr swain   - Neurosurgery evaluation   - Pain management recs appreciated  - In the meantime start pain regimen: Gabapentin, Tylenol Oxycodone ER and morphine IV for breakthrough pain. Will avoid NSAIDS given GERD and ?CAD     - Bowel regimen   - PT/Rehab     # Microcytic Anemia - iron deficiency  - No evidence of melena   - Iron studies show DELLA, started on supplementaal iron  - May need outpatient C scope     # HTN   - Continue home meds   - Patient was on aspirin 325 at home unclear reason. Will continue 81 for now (no on record history of CAD)     # DLD  - Continue home meds     # GERD  - Continue home meds     # AAA - Stable being monitored   - Outpatient follow up with Dr. Sosa     GI PPX: Pantoprazole   DVT PPX: Lovenox     DIET: DASH    Pending:   neuro  neuriosurg  dispo

## 2020-02-07 ENCOUNTER — TRANSCRIPTION ENCOUNTER (OUTPATIENT)
Age: 75
End: 2020-02-07

## 2020-02-07 LAB
ALBUMIN SERPL ELPH-MCNC: 4.3 G/DL — SIGNIFICANT CHANGE UP (ref 3.5–5.2)
ALP SERPL-CCNC: 57 U/L — SIGNIFICANT CHANGE UP (ref 30–115)
ALT FLD-CCNC: 23 U/L — SIGNIFICANT CHANGE UP (ref 0–41)
ANION GAP SERPL CALC-SCNC: 15 MMOL/L — HIGH (ref 7–14)
AST SERPL-CCNC: 21 U/L — SIGNIFICANT CHANGE UP (ref 0–41)
BASOPHILS # BLD AUTO: 0.05 K/UL — SIGNIFICANT CHANGE UP (ref 0–0.2)
BASOPHILS NFR BLD AUTO: 0.5 % — SIGNIFICANT CHANGE UP (ref 0–1)
BILIRUB SERPL-MCNC: 0.3 MG/DL — SIGNIFICANT CHANGE UP (ref 0.2–1.2)
BUN SERPL-MCNC: 24 MG/DL — HIGH (ref 10–20)
CALCIUM SERPL-MCNC: 9.2 MG/DL — SIGNIFICANT CHANGE UP (ref 8.5–10.1)
CHLORIDE SERPL-SCNC: 101 MMOL/L — SIGNIFICANT CHANGE UP (ref 98–110)
CO2 SERPL-SCNC: 23 MMOL/L — SIGNIFICANT CHANGE UP (ref 17–32)
CREAT SERPL-MCNC: 1 MG/DL — SIGNIFICANT CHANGE UP (ref 0.7–1.5)
EOSINOPHIL # BLD AUTO: 0.26 K/UL — SIGNIFICANT CHANGE UP (ref 0–0.7)
EOSINOPHIL NFR BLD AUTO: 2.8 % — SIGNIFICANT CHANGE UP (ref 0–8)
GLUCOSE SERPL-MCNC: 104 MG/DL — HIGH (ref 70–99)
HCT VFR BLD CALC: 37.2 % — LOW (ref 42–52)
HGB BLD-MCNC: 11.6 G/DL — LOW (ref 14–18)
IMM GRANULOCYTES NFR BLD AUTO: 0.6 % — HIGH (ref 0.1–0.3)
LYMPHOCYTES # BLD AUTO: 2.66 K/UL — SIGNIFICANT CHANGE UP (ref 1.2–3.4)
LYMPHOCYTES # BLD AUTO: 28.3 % — SIGNIFICANT CHANGE UP (ref 20.5–51.1)
MAGNESIUM SERPL-MCNC: 2 MG/DL — SIGNIFICANT CHANGE UP (ref 1.8–2.4)
MCHC RBC-ENTMCNC: 24.4 PG — LOW (ref 27–31)
MCHC RBC-ENTMCNC: 31.2 G/DL — LOW (ref 32–37)
MCV RBC AUTO: 78.3 FL — LOW (ref 80–94)
MONOCYTES # BLD AUTO: 0.77 K/UL — HIGH (ref 0.1–0.6)
MONOCYTES NFR BLD AUTO: 8.2 % — SIGNIFICANT CHANGE UP (ref 1.7–9.3)
NEUTROPHILS # BLD AUTO: 5.6 K/UL — SIGNIFICANT CHANGE UP (ref 1.4–6.5)
NEUTROPHILS NFR BLD AUTO: 59.6 % — SIGNIFICANT CHANGE UP (ref 42.2–75.2)
NRBC # BLD: 0 /100 WBCS — SIGNIFICANT CHANGE UP (ref 0–0)
PLATELET # BLD AUTO: 283 K/UL — SIGNIFICANT CHANGE UP (ref 130–400)
POTASSIUM SERPL-MCNC: 4.4 MMOL/L — SIGNIFICANT CHANGE UP (ref 3.5–5)
POTASSIUM SERPL-SCNC: 4.4 MMOL/L — SIGNIFICANT CHANGE UP (ref 3.5–5)
PROT SERPL-MCNC: 7.3 G/DL — SIGNIFICANT CHANGE UP (ref 6–8)
RBC # BLD: 4.75 M/UL — SIGNIFICANT CHANGE UP (ref 4.7–6.1)
RBC # FLD: 17.8 % — HIGH (ref 11.5–14.5)
SODIUM SERPL-SCNC: 139 MMOL/L — SIGNIFICANT CHANGE UP (ref 135–146)
WBC # BLD: 9.4 K/UL — SIGNIFICANT CHANGE UP (ref 4.8–10.8)
WBC # FLD AUTO: 9.4 K/UL — SIGNIFICANT CHANGE UP (ref 4.8–10.8)

## 2020-02-07 PROCEDURE — 99233 SBSQ HOSP IP/OBS HIGH 50: CPT

## 2020-02-07 RX ORDER — POLYETHYLENE GLYCOL 3350 17 G/17G
17 POWDER, FOR SOLUTION ORAL DAILY
Refills: 0 | Status: DISCONTINUED | OUTPATIENT
Start: 2020-02-07 | End: 2020-02-11

## 2020-02-07 RX ORDER — LISINOPRIL 2.5 MG/1
40 TABLET ORAL DAILY
Refills: 0 | Status: DISCONTINUED | OUTPATIENT
Start: 2020-02-08 | End: 2020-02-11

## 2020-02-07 RX ORDER — METHOCARBAMOL 500 MG/1
750 TABLET, FILM COATED ORAL EVERY 6 HOURS
Refills: 0 | Status: DISCONTINUED | OUTPATIENT
Start: 2020-02-07 | End: 2020-02-11

## 2020-02-07 RX ORDER — GABAPENTIN 400 MG/1
200 CAPSULE ORAL EVERY 8 HOURS
Refills: 0 | Status: DISCONTINUED | OUTPATIENT
Start: 2020-02-07 | End: 2020-02-09

## 2020-02-07 RX ORDER — SENNA PLUS 8.6 MG/1
2 TABLET ORAL AT BEDTIME
Refills: 0 | Status: DISCONTINUED | OUTPATIENT
Start: 2020-02-07 | End: 2020-02-11

## 2020-02-07 RX ORDER — ACETAMINOPHEN 500 MG
650 TABLET ORAL EVERY 4 HOURS
Refills: 0 | Status: DISCONTINUED | OUTPATIENT
Start: 2020-02-07 | End: 2020-02-11

## 2020-02-07 RX ADMIN — OXYCODONE HYDROCHLORIDE 10 MILLIGRAM(S): 5 TABLET ORAL at 08:00

## 2020-02-07 RX ADMIN — OXYCODONE HYDROCHLORIDE 10 MILLIGRAM(S): 5 TABLET ORAL at 18:25

## 2020-02-07 RX ADMIN — GABAPENTIN 100 MILLIGRAM(S): 400 CAPSULE ORAL at 05:28

## 2020-02-07 RX ADMIN — GABAPENTIN 200 MILLIGRAM(S): 400 CAPSULE ORAL at 21:36

## 2020-02-07 RX ADMIN — POLYETHYLENE GLYCOL 3350 17 GRAM(S): 17 POWDER, FOR SOLUTION ORAL at 12:14

## 2020-02-07 RX ADMIN — SIMVASTATIN 80 MILLIGRAM(S): 20 TABLET, FILM COATED ORAL at 21:36

## 2020-02-07 RX ADMIN — Medication 650 MILLIGRAM(S): at 21:36

## 2020-02-07 RX ADMIN — OXYCODONE HYDROCHLORIDE 10 MILLIGRAM(S): 5 TABLET ORAL at 20:40

## 2020-02-07 RX ADMIN — OXYCODONE HYDROCHLORIDE 10 MILLIGRAM(S): 5 TABLET ORAL at 11:04

## 2020-02-07 RX ADMIN — METHOCARBAMOL 750 MILLIGRAM(S): 500 TABLET, FILM COATED ORAL at 17:44

## 2020-02-07 RX ADMIN — PANTOPRAZOLE SODIUM 40 MILLIGRAM(S): 20 TABLET, DELAYED RELEASE ORAL at 06:21

## 2020-02-07 RX ADMIN — Medication 12.5 MILLIGRAM(S): at 05:28

## 2020-02-07 RX ADMIN — Medication 5 MILLIGRAM(S): at 21:37

## 2020-02-07 RX ADMIN — LISINOPRIL 20 MILLIGRAM(S): 2.5 TABLET ORAL at 05:28

## 2020-02-07 RX ADMIN — OXYCODONE HYDROCHLORIDE 10 MILLIGRAM(S): 5 TABLET ORAL at 16:19

## 2020-02-07 RX ADMIN — OXYCODONE HYDROCHLORIDE 10 MILLIGRAM(S): 5 TABLET ORAL at 12:00

## 2020-02-07 RX ADMIN — SENNA PLUS 2 TABLET(S): 8.6 TABLET ORAL at 21:35

## 2020-02-07 RX ADMIN — Medication 325 MILLIGRAM(S): at 12:14

## 2020-02-07 RX ADMIN — Medication 650 MILLIGRAM(S): at 17:45

## 2020-02-07 RX ADMIN — OXYCODONE HYDROCHLORIDE 10 MILLIGRAM(S): 5 TABLET ORAL at 06:58

## 2020-02-07 NOTE — PROGRESS NOTE ADULT - SUBJECTIVE AND OBJECTIVE BOX
MEDARDO GUTIERREZ 74y Male  MRN#: [id number]     SUBJECTIVE  Patient is a 74y old Male who presents with a chief complaint of Back Pain (06 Feb 2020 16:41)  Today is hospital day 3d, and this morning he is lying in bed without distress.   No acute overnight events.   anticipating nsx  no chast pain  no sob  feels like a drug addict with all opioids he is taking  wants definitive managmenet  no abdominl pain  hasnt had a bm    OBJECTIVE  PAST MEDICAL & SURGICAL HISTORY  History of degenerative disc disease  Elevated lipids  Abdominal aortic aneurysm  HTN (hypertension)    ALLERGIES:  No Known Allergies    MEDICATIONS:  STANDING MEDICATIONS  enoxaparin Injectable 40 milliGRAM(s) SubCutaneous daily  ferrous    sulfate 325 milliGRAM(s) Oral daily  gabapentin 100 milliGRAM(s) Oral three times a day  hydrochlorothiazide 12.5 milliGRAM(s) Oral daily  lisinopril 20 milliGRAM(s) Oral every 12 hours  melatonin 5 milliGRAM(s) Oral at bedtime  pantoprazole    Tablet 40 milliGRAM(s) Oral before breakfast  simvastatin Oral Tab/Cap - Peds 80 milliGRAM(s) Oral at bedtime    PRN MEDICATIONS  acetaminophen   Tablet .. 650 milliGRAM(s) Oral every 4 hours PRN  aluminum hydroxide/magnesium hydroxide/simethicone Suspension 30 milliLiter(s) Oral every 4 hours PRN  oxyCODONE    IR 10 milliGRAM(s) Oral every 4 hours PRN  polyethylene glycol 3350 17 Gram(s) Oral daily PRN  senna 2 Tablet(s) Oral at bedtime PRN    HOME MEDICATIONS  Home Medications:  aspirin 325 mg oral delayed release tablet: 1 tab(s) orally once a day (04 Feb 2020 15:52)  gabapentin 100 mg oral capsule: 1 cap(s) orally 3 times a day (04 Feb 2020 15:51)  ibuprofen 400 mg oral tablet: 1 tab(s) orally every 6 hours (04 Feb 2020 15:51)  Lexapro 20 mg oral tablet: 1 tab(s) orally once a day (04 Feb 2020 15:52)  lisinopril 20 mg oral tablet: 1 tab(s) orally every 12 hours (04 Feb 2020 15:52)  pantoprazole 40 mg oral delayed release tablet: 1 tab(s) orally once a day (04 Feb 2020 15:51)  simvastatin 80 mg oral tablet: 1 tab(s) orally once a day (at bedtime) (04 Feb 2020 15:52)  traMADol 50 mg oral tablet: 1 tab(s) orally every 4 hours (04 Feb 2020 15:51)      VITAL SIGNS: Last 24 Hours  T(C): 35.9 (07 Feb 2020 05:37), Max: 36.8 (06 Feb 2020 13:26)  T(F): 96.6 (07 Feb 2020 05:37), Max: 98.3 (06 Feb 2020 13:26)  HR: 68 (07 Feb 2020 05:37) (66 - 85)  BP: 142/71 (07 Feb 2020 05:37) (113/68 - 147/79)  BP(mean): --  RR: 18 (07 Feb 2020 05:37) (18 - 18)  SpO2: 99% (06 Feb 2020 19:55) (98% - 99%)        PHYSICAL EXAM:  PHYSICAL EXAM:  GENERAL: NAD, AAO x 4, 74y M  HEAD:  Atraumatic, Normocephalic  EYES: EOMI, conjunctiva clear and sclera white  NECK: Supple, No JVD  CHEST/LUNG: Clear to auscultation bilaterally; No wheeze; No crackles; No accessory muscles used  HEART: Regular rate and rhythm; No murmurs;   ABDOMEN: Soft, Nontender, Nondistended; Bowel sounds present; No guarding  EXTREMITIES:  2+ Peripheral Pulses, No cyanosis or edema  NEUROLOGY: non-focal    ADMISSION SUMMARY  Patient is a 74y old Male who presents with a chief complaint of Back Pain (06 Feb 2020 16:41)

## 2020-02-07 NOTE — DISCHARGE NOTE PROVIDER - HOSPITAL COURSE
admitted for intractable back pain    OP mri showed multiple lumbar herniated discs and foraminal stenosis    seen by neurosurgery irene SORENSON CC: Back Pain        PMH: Multiple herniated discs, HTN, HLD, GERD, AAA         History of Present Illness goes back to the past month when the patient started having back pain that has been chronic but with recent worsening. He was seeing neurology, rehab and pain management with little improvement. This morning the pain was so bad upon walking that he called EMS and presented to the ED. He had an MRI done which showed multiple levels of degenerative disease and disc herniations. He denies numbness, weakness, saddle anesthesia, urinary retention of fecal incontinence.        his MRI showed l2-3 disc herniation , spinal stenosis and b/; foraminal stenosis at multilevels.           He underwent discectomy by neurosx, no post op complications, pain improved , ambulating. patient was stable to be discharged to  .

## 2020-02-07 NOTE — CONSULT NOTE ADULT - SUBJECTIVE AND OBJECTIVE BOX
Neurology consult    MEDARDO GUTIERREZ74yMale    HPI:  [74 year old male]    CC: Back Pain    PMH: Multiple herniated discs, HTN, HLD, GERD, AAA     History of Present Illness goes back to the past month when the patient started having back pain that has been chronic but with recent worsening. He was seeing neurology, rehab and pain management with little improvement. This morning the pain was so bad upon walking that he called EMS and presented to the ED. He had an MRI done which showed multiple levels of degenerative disease and disc herniations. He denies numbness, weakness, saddle anesthesia, urinary retention of fecal incontinence. C/o tingling in the whole lt leg, weakness in the lt leg when stands up.  Has h/o chr LBP with intermittent exacerbation in the past.      MEDICATIONS    acetaminophen   Tablet .. 650 milliGRAM(s) Oral every 4 hours PRN  aluminum hydroxide/magnesium hydroxide/simethicone Suspension 30 milliLiter(s) Oral every 4 hours PRN  ferrous    sulfate 325 milliGRAM(s) Oral daily  gabapentin 100 milliGRAM(s) Oral three times a day  hydrochlorothiazide 12.5 milliGRAM(s) Oral daily  melatonin 5 milliGRAM(s) Oral at bedtime  oxyCODONE    IR 10 milliGRAM(s) Oral every 4 hours PRN  pantoprazole    Tablet 40 milliGRAM(s) Oral before breakfast  polyethylene glycol 3350 17 Gram(s) Oral daily  senna 2 Tablet(s) Oral at bedtime  simvastatin Oral Tab/Cap - Peds 80 milliGRAM(s) Oral at bedtime      PAST MEDICAL & SURGICAL HISTORY:  History of degenerative disc disease  Elevated lipids  Abdominal aortic aneurysm  HTN (hypertension)       Family history: No history of dementia, strokes, or seizures   FAMILY HISTORY:  Family history of degenerative disc disease: Sister and mother    SOCIAL HISTORY --     Allergies  No Known Allergies    Vital Signs Last 24 Hrs  T(C): 35.9 (07 Feb 2020 05:37), Max: 36.8 (06 Feb 2020 13:26)  T(F): 96.6 (07 Feb 2020 05:37), Max: 98.3 (06 Feb 2020 13:26)  HR: 68 (07 Feb 2020 05:37) (66 - 85)  BP: 142/71 (07 Feb 2020 05:37) (113/68 - 142/71)  BP(mean): --  RR: 18 (07 Feb 2020 05:37) (18 - 18)  SpO2: 99% (06 Feb 2020 19:55) (98% - 99%)    INTRACTABLE BACK PAIN AMBULATORY DYSFUNCTION  ^INTRACTABLE BACK PAIN AMBULATORY DYSFUNCTION  Family history of degenerative disc disease  No pertinent family history in first degree relatives  Handoff  MEWS Score  History of degenerative disc disease  Elevated lipids  Abdominal aortic aneurysm  HTN (hypertension)  Intractable back pain  History of degenerative disc disease  BACK PAIN 1X MONTH  Ambulatory dysfunction      REVIEW OF SYSTEMS:    Constitutional: No fever, chills, fatigue, weakness  Eyes: no eye pain, visual disturbances, or discharge  ENT:  No difficulty hearing, tinnitus, vertigo; No sinus or throat pain  Neck: No pain or stiffness  Respiratory: No cough, dyspnea, wheezing   Cardiovascular: No chest pain, palpitations,   Gastrointestinal: No abdominal or epigastric pain. No nausea, vomiting  No diarrhea or constipation.   Genitourinary: No dysuria, frequency, hematuria or incontinence  Neurological: as per HPI  Psychiatric: No depression, anxiety, mood swings or difficulty sleeping  Musculoskeletal: as per HPI  Skin: No itching, burning, rashes or lesions   Lymph Nodes: No enlarged glands  Endocrine: No heat or cold intolerance; No hair loss, No h/o diabetes or thyroid dysfunction  Allergy and Immunologic: No hives or eczema    PHYSICAL EXAMINATION:  General:  Well-developed, well nourished, in no acute distress.  Eyes: Conjunctiva and sclera clear.  Cardiovascular: Regular rate and rhythm; S1 and S2 Normal; No murmurs, gallops or rubs.  Neurologic:  - Mental Status:  Alert, awake, oriented to person, place, and time; Speech is fluent with intact naming, repetition, and comprehension.  - Cranial Nerves II-XII:  PERRLA, EOMI, no nystagmus, face simmetrical, hearing intact to finger rub bl, tongue midline  - Motor:  Strength is 5/5 throughout.  There is no pronator drift.  Normal muscle bulk and tone throughout.  - Reflexes:  2+ symmetric throughout.  Plantar responses flexor.  - Sensory:  hypoesthesoa in the lt L3,4,5, S1 dermatomes  - Coordination:  Finger-nose-finger and heel-knee-shin intact without dysmetria.  Rapid alternating hand movements intact.  - Gait:  Antalgic. No ataxia.  - SLR neg bl.    LABS:  CBC Full  -  ( 07 Feb 2020 07:09 )  WBC Count : 9.40 K/uL  RBC Count : 4.75 M/uL  Hemoglobin : 11.6 g/dL  Hematocrit : 37.2 %  Platelet Count - Automated : 283 K/uL  Mean Cell Volume : 78.3 fL  Mean Cell Hemoglobin : 24.4 pg  Mean Cell Hemoglobin Concentration : 31.2 g/dL  Auto Neutrophil # : 5.60 K/uL  Auto Lymphocyte # : 2.66 K/uL  Auto Monocyte # : 0.77 K/uL  Auto Eosinophil # : 0.26 K/uL  Auto Basophil # : 0.05 K/uL  Auto Neutrophil % : 59.6 %  Auto Lymphocyte % : 28.3 %  Auto Monocyte % : 8.2 %  Auto Eosinophil % : 2.8 %  Auto Basophil % : 0.5 %      02-07    139  |  101  |  24<H>  ----------------------------<  104<H>  4.4   |  23  |  1.0    Ca    9.2      07 Feb 2020 07:09  Mg     2.0     02-07    TPro  7.3  /  Alb  4.3  /  TBili  0.3  /  DBili  x   /  AST  21  /  ALT  23  /  AlkPhos  57  02-07    Hemoglobin A1C:     LIVER FUNCTIONS - ( 07 Feb 2020 07:09 )  Alb: 4.3 g/dL / Pro: 7.3 g/dL / ALK PHOS: 57 U/L / ALT: 23 U/L / AST: 21 U/L / GGT: x           RADIOLOGY  MRI LS spine from outside radiology - multilevel DDD/DSD/DJD, mod to severe spinal stenosis.    Seen by NS - note reviewed.    Seen by pain mgt - note reviewed    Seen by PT - note reviewed.

## 2020-02-07 NOTE — DISCHARGE NOTE PROVIDER - NSDCMRMEDTOKEN_GEN_ALL_CORE_FT
aspirin 325 mg oral delayed release tablet: 1 tab(s) orally once a day  gabapentin 100 mg oral capsule: 1 cap(s) orally 3 times a day  ibuprofen 400 mg oral tablet: 1 tab(s) orally every 6 hours  Lexapro 20 mg oral tablet: 1 tab(s) orally once a day  lisinopril 20 mg oral tablet: 1 tab(s) orally every 12 hours  pantoprazole 40 mg oral delayed release tablet: 1 tab(s) orally once a day  simvastatin 80 mg oral tablet: 1 tab(s) orally once a day (at bedtime)  traMADol 50 mg oral tablet: 1 tab(s) orally every 4 hours acetaminophen 325 mg oral tablet: 2 tab(s) orally every 6 hours  aluminum hydroxide-magnesium hydroxide 200 mg-200 mg/5 mL oral suspension: 30 milliliter(s) orally every 4 hours, As needed, Dyspepsia  atorvastatin 40 mg oral tablet: 1 tab(s) orally once a day (at bedtime)  bisacodyl 5 mg oral delayed release tablet: 1 tab(s) orally every 12 hours, As needed, Constipation  calcium carbonate 500 mg (200 mg elemental calcium) oral tablet, chewable: 1 tab(s) orally every 6 hours, As needed, Heartburn  gabapentin 100 mg oral capsule: 2 cap(s) orally every 8 hours  hydroCHLOROthiazide 12.5 mg oral capsule: 1 cap(s) orally once a day  lisinopril 40 mg oral tablet: 1 tab(s) orally once a day  melatonin 5 mg oral tablet: 1 tab(s) orally once a day (at bedtime)  methocarbamol 500 mg oral tablet: 1 tab(s) orally every 6 hours  nicotine 14 mg/24 hr transdermal film, extended release:  transdermal   oxyCODONE 5 mg oral tablet: 1 tab(s) orally every 4 hours, As needed, Severe Pain (7 - 10)  pantoprazole 40 mg oral delayed release tablet: 1 tab(s) orally once a day (before a meal)  polyethylene glycol 3350 oral powder for reconstitution: 17 gram(s) orally once a day  senna oral tablet: 2 tab(s) orally once a day (at bedtime)  tamsulosin 0.4 mg oral capsule: 1 cap(s) orally once a day (at bedtime)

## 2020-02-07 NOTE — DISCHARGE NOTE PROVIDER - CARE PROVIDER_API CALL
Jadon Villalba)  Internal Medicine  [address] [place] [address]  Phone: [phone]  Fax: [phone]  Follow Up Time: 1 week Jadon Villalba)  Internal Medicine  [address] [place] [address]  Phone: [phone]  Fax: [phone]  Follow Up Time: 1 week

## 2020-02-07 NOTE — PROGRESS NOTE ADULT - SUBJECTIVE AND OBJECTIVE BOX
He has a very painful lumbar radiculopathy.  PT isn't tolerated.  He amb only 5 feet on account of pain with a walker yesterday.  Lumbar radic.  ?For neurosurgery Mon. or Tues.  Restart PT after surgery when NS OK's.

## 2020-02-07 NOTE — PROGRESS NOTE ADULT - SUBJECTIVE AND OBJECTIVE BOX
Pain Management Progress Note -     HPI:  [74 year old male]    CC: Back Pain    PMH: Multiple herniated discs, HTN, HLD, GERD, AAA. Patient with back pain radiating to the left leg upto the knee. Patient states his pain is controlled at this time. Will sign off. Recall for any uncontrolled pain.       No Known Allergies      INTRACTABLE BACK PAIN AMBULATORY DYSFUNCTION  ^INTRACTABLE BACK PAIN AMBULATORY DYSFUNCTION  Family history of degenerative disc disease  No pertinent family history in first degree relatives  Handoff  MEWS Score  History of degenerative disc disease  Elevated lipids  Abdominal aortic aneurysm  HTN (hypertension)  Intractable back pain  History of degenerative disc disease  BACK PAIN 1X MONTH  Ambulatory dysfunction      simvastatin Oral Tab/Cap - Peds  pantoprazole    Tablet  oxyCODONE    IR  melatonin  ferrous    sulfate  aluminum hydroxide/magnesium hydroxide/simethicone Suspension  hydrochlorothiazide  lisinopril  senna  polyethylene glycol 3350  methocarbamol  acetaminophen   Tablet ..  chlorhexidine 4% Liquid  enoxaparin Injectable  bisacodyl  nicotine -  14 mG/24Hr(s) Patch  gabapentin      acetaminophen   Tablet .. 650 milliGRAM(s) Oral every 4 hours  aluminum hydroxide/magnesium hydroxide/simethicone Suspension 30 milliLiter(s) Oral every 4 hours PRN  bisacodyl 5 milliGRAM(s) Oral every 12 hours PRN  chlorhexidine 4% Liquid 1 Application(s) Topical <User Schedule>  enoxaparin Injectable 40 milliGRAM(s) SubCutaneous daily  ferrous    sulfate 325 milliGRAM(s) Oral daily  gabapentin 300 milliGRAM(s) Oral every 8 hours  hydrochlorothiazide 12.5 milliGRAM(s) Oral daily  lisinopril 40 milliGRAM(s) Oral daily  melatonin 5 milliGRAM(s) Oral at bedtime  methocarbamol 750 milliGRAM(s) Oral every 6 hours  nicotine -  14 mG/24Hr(s) Patch 1 patch Transdermal daily  oxyCODONE    IR 10 milliGRAM(s) Oral every 4 hours PRN  pantoprazole    Tablet 40 milliGRAM(s) Oral before breakfast  polyethylene glycol 3350 17 Gram(s) Oral daily  senna 2 Tablet(s) Oral at bedtime  simvastatin Oral Tab/Cap - Peds 80 milliGRAM(s) Oral at bedtime      02-10 @ 06:3866 mL/min/1.73M2          Hemoglobin: 11.1 g/dL (02-10 @ 06:38)  Hemoglobin: 11.6 g/dL (02-09 @ 08:01)        T(C): 35.9 (02-10-20 @ 13:44), Max: 35.9 (02-09-20 @ 21:18)  HR: 77 (02-10-20 @ 13:44) (63 - 80)  BP: 131/73 (02-10-20 @ 13:44) (116/69 - 140/67)  RR: 20 (02-10-20 @ 13:44) (18 - 20)  SpO2: 97% (02-09-20 @ 19:37) (97% - 97%)  Wt(kg): --     REVIEW OF SYSTEMS    General:	NAD   Skin/Breast:	Neg  Ophthalmologic:	Neg  ENMT: Neg	  Respiratory and Thorax: Neg	  Cardiovascular:	Neg  Gastrointestinal:	Neg  Genitourinary:	Neg  Musculoskeletal:	Neg  Neurological:	Neg  Psychiatric:	Neg  Hematology/Lymphatics:	Neg  Endocrine:	Neg        PHYSICAL EXAM:    GENERAL: NAD, well-groomed, well-developed  HEAD:  Atraumatic, Normocephalic  EYES: EOMI, PERRLA, conjunctiva and sclera clear  ENMT: No tonsillar erythema, exudates, or enlargement; Moist mucous membranes, Good dentition, No lesions  NECK: Supple, No JVD, Normal thyroid  NERVOUS SYSTEM:  Alert & Oriented X3, Good concentration; Motor Strength 5/5 B/L upper and lower extremities; DTRs 2+ intact and symmetric  CHEST/LUNG: Clear to percussion bilaterally; No rales, rhonchi, wheezing, or rubs  HEART: Regular rate and rhythm; No murmurs, rubs, or gallops  ABDOMEN: Soft, Nontender, Nondistended; Bowel sounds present  EXTREMITIES: No clubbing, cyanosis, or edema  LYMPH: No lymphadenopathy noted  SKIN: No rashes or lesions

## 2020-02-07 NOTE — PROGRESS NOTE ADULT - SUBJECTIVE AND OBJECTIVE BOX
MEDARDO GUTIERREZ  74y  Male  ***My note supersedes ALL resident notes that I sign.  My corrections for their notes are in my note.***    I can be reached directly on [email or web address]. My office number is [phone]. My personal cell number is [phone].    INTERVAL EVENTS: Here for f/u of back pain. Pt is mostly comfortable at rest w/ meds.  Pt still cannot stand and walk for a period longer than 2 minutes.    T(F): 96.6 (02-07-20 @ 05:37), Max: 96.8 (02-06-20 @ 18:28)  HR: 68 (02-07-20 @ 05:37) (66 - 72)  BP: 142/71 (02-07-20 @ 05:37) (120/73 - 142/71)  RR: 18 (02-07-20 @ 05:37) (18 - 18)  SpO2: 99% (02-06-20 @ 19:55) (98% - 99%)    Gen: NAD; fairly comfortable and mobile in bed  HEENT: WNL  Neck: no JVD, no Nodes  lungs: clr  hrt: s1 s2 rrr  abd soft nt/nd  ext no edema, no c/c  neuro: aa ox3, CN intact, lt patella reflexes are 1+ compare to rt of 2+; pt has excellent ROM of legs at hips, knees and ankles, no foot drop    LABS:                        11.6    (    78.3   9.40  )-----------( ---------      283      ( 07 Feb 2020 07:09 )             37.2    (    17.8     139   (   101   (   104      02-07-20 @ 07:09  ----------------------               4.4   (   23   (   24                             -----                        1.0  Ca  9.2   Mg  2.0    P   --     LFT  7.3  (  0.3  (  21       02-07-20 @ 07:09  -------------------------  4.3  (  57  (  23    RADIOLOGY & ADDITIONAL TESTS:      MEDICATIONS:    acetaminophen   Tablet .. 650 milliGRAM(s) Oral every 4 hours PRN  aluminum hydroxide/magnesium hydroxide/simethicone Suspension 30 milliLiter(s) Oral every 4 hours PRN  ferrous    sulfate 325 milliGRAM(s) Oral daily  gabapentin 100 milliGRAM(s) Oral three times a day  hydrochlorothiazide 12.5 milliGRAM(s) Oral daily  melatonin 5 milliGRAM(s) Oral at bedtime  oxyCODONE    IR 10 milliGRAM(s) Oral every 4 hours PRN  pantoprazole    Tablet 40 milliGRAM(s) Oral before breakfast  polyethylene glycol 3350 17 Gram(s) Oral daily  senna 2 Tablet(s) Oral at bedtime  simvastatin Oral Tab/Cap - Peds 80 milliGRAM(s) Oral at bedtime

## 2020-02-07 NOTE — CONSULT NOTE ADULT - ASSESSMENT
A 73 yo man with multiple comorbidities and chr LBP had ecacerbation of LBP.    Impression:  - intractable LBP 2/2 DDD/DSD/DJD with moder/severe spinal stenosis and L-radiculopathy  - HTN, HLD, AAA    Plan:  - agree with steroid injection by IR ( facet)  - cont NS recommendations   - cont pain mgt recommendations  - increase Neurontin to 200mg po tid,if no improvement in 2-3 days increase to 300mg po tid  - PT/rehab  - please call us for any questions  - f/u with neurology after d/c  - please call us for any questions  D/w dr. Correia and primary team A 75 yo man with multiple comorbidities and chr LBP had ecacerbation of LBP.    Impression:  - intractable LBP 2/2 DDD/DSD/DJD with moder/severe spinal stenosis and L-radiculopathy  - HTN, HLD, AAA    Plan:  - agree with steroid injection by IR ( facet)  - cont NS recommendations   - cont pain mgt recommendations  - Robaxin 750mg po q6hrs  - increase Neurontin to 200mg po tid,if no improvement in 2-3 days increase to 300mg po tid  - PT/rehab  - please call us for any questions  - f/u with neurology after d/c  - please call us for any questions  D/w dr. Correia and primary team

## 2020-02-07 NOTE — PROGRESS NOTE ADULT - ASSESSMENT
Patient is a 74y old Male who presents with a chief complaint of Currently admitted to medicine with the primary diagnosis of Intractable back pain    # Intractable back pain secondary to multiple level disc disease   Outpatient MRI report is available with patient as well as discs   Outpatient MRI impression:   1-Multilevel degenerative disc disease without evidence of fracture or bone marrow edema   2-L2-L3 demonstrates an extruded disc herniation with focally involving the left paracentral and articular recess regions which demonstrates caudal extension posterior to the L3 vertebral boy. The dis material measures 2.3X0.8 cm with contact and displacement of the intradural left L3 nerve root. There is morederate central spinal canal stenosis with moderate narrowing of the left neuroforamen and mild to moderate narrowing of the righ neuroforamen   3-L3-4 space demonstrates contact of the exiting left L3  nerve root. There is mild to moderate central spinal canal stenosis with mild to moderate narrowing of the left neuroforamen and mild narrowing of the the right neuroforamen   4-L4-5 space demonstrates contact of the intradural L5 nerve roots and the exiting L4 nerve roots. There is facet arthropathy and hypertrophy of the ligamentum flavum more prominent on the left. There is moderate to severe central spinal canal stenosis with moderate to severe narrowing of the right neuroforamen and severe narrowing of the left neuroforamen   5-L5-S1 disc space demonstrates contact and displacement and the intradural left S1 nerve root. There is narrowing of the left articular recess with moderate narrowing of the left neuroforamen and mild narrowing of the right neuroforamen   No clinical evidence of cord compression     PLAN  - Neurology evaluation patient follows with dr swain , was called in  - Neurosurgery evaluation , pending attending recs  - Pain management recs appreciated  - In the meantime start pain regimen: Gabapentin, Tylenol Oxycodone ER and morphine IV for breakthrough pain. Will avoid NSAIDS given GERD and ?CAD     - Bowel regimen   - PT/Rehab     # Microcytic Anemia - iron deficiency  - No evidence of melena   - Iron studies show DELLA, started on supplementaal iron  - May need outpatient C scope     # HTN   - Continue home meds   - Patient was on aspirin 325 at home unclear reason. Will continue 81 for now (no on record history of CAD)     # DLD  - Continue home meds     # GERD  - Continue home meds     # AAA - Stable being monitored   - Outpatient follow up with Dr. Sosa     GI PPX: Pantoprazole   DVT PPX: Lovenox     DIET: DASH    Pending:   neuro  neuriosurg  dispo

## 2020-02-07 NOTE — DISCHARGE NOTE PROVIDER - NSDCFUSCHEDAPPT_GEN_ALL_CORE_FT
MEDARDO GUTIERREZ ; 03/20/2020 ; NPP [facility] [address] MEDARDO [address] ; 05/05/2020 ; NPP [facility] [address]

## 2020-02-07 NOTE — PROGRESS NOTE ADULT - ASSESSMENT
74y old Male admitted to medicine with the primary diagnosis of Intractable back pain    # Intractable back pain secondary to multiple level disc disease; L2-3 disc james; spinal stenosis; b/l (L>R) foraminal stenosis at [facility]  Neuro - d/w Dr Wagner - no further w/u; tx pain - add robaxin, incr neurontin  NSx (Dr Blackman) - in progress  Neuro IR (Dr Campos) - in progress (he wants to talk w/ NSx)  Pain mngmt noted  need rehab eval    # pain reg per pain mngmt  Tylenol 650mg po q4 - around the clock  gabapentin 200 milliGRAM(s) Oral three times a day - if tolerating, increase to 300mg po q8 tomorrow, if pain not controlled  oxyCODONE IR 10 milliGRAM(s) Oral every 4 hours PRN sev pain  robaxin 750mg po q6  make Warm compress to the left lower back Q4hr [id number] on/off for an hour  DC morphine; DC traMADol   avoid NSAIDS given GERD and anemia  cont bowel regimen     # Microcytic Anemia 2/2 DELLA - unsure of cause  no evidence of bleeding or bruising   will need outpt GI eval for scopes  cont iron supplementation   hold asa    # HTN - better  Continue HCTZ and ACEI     # HLD  Continue zocor    # GERD  Continue ppi    # AAA - Stable being monitored   Outpatient follow up with Dr. Sosa     # DVT ppx: lovenox subq    Disposition: f/u Nsx and neuro IR discussion for plan eval; tx pain as above; rehab eval; outpt w/u for DELLA 74y old Male admitted to medicine with the primary diagnosis of Intractable back pain    # Intractable back pain secondary to multiple level disc disease; L2-3 disc james; spinal stenosis; b/l (L>R) foraminal stenosis at [facility]  Neuro - d/w Dr Wagner - no further w/u; tx pain - add robaxin, incr neurontin  NSx (Dr Blackman) - noted  Neuro IR (Dr Campos) - spoke w/ NSx  Pain mngmt noted  need rehab eval  plan is for back surg next week    # pain reg per pain mngmt  Tylenol 650mg po q4 - around the clock  gabapentin 200 milliGRAM(s) Oral three times a day - if tolerating, increase to 300mg po q8 tomorrow, if pain not controlled  oxyCODONE IR 10 milliGRAM(s) Oral every 4 hours PRN sev pain  robaxin 750mg po q6  make Warm compress to the left lower back Q4hr [id number] on/off for an hour  DC morphine; DC traMADol   avoid NSAIDS given GERD and anemia  cont bowel regimen     # Microcytic Anemia 2/2 DELLA - unsure of cause  no evidence of bleeding or bruising   will need outpt GI eval for scopes  cont iron supplementation   hold asa    # HTN - better  Continue HCTZ and ACEI     # HLD  Continue zocor    # GERD  Continue ppi    # AAA - Stable being monitored   Outpatient follow up with Dr. Sosa     # DVT ppx: lovenox subq    Disposition: f/u Nsx for back sx next week; tx pain as above; rehab eval; outpt w/u for DELLA

## 2020-02-07 NOTE — DISCHARGE NOTE PROVIDER - NSDCCPCAREPLAN_GEN_ALL_CORE_FT
PRINCIPAL DISCHARGE DIAGNOSIS  Diagnosis: Intractable back pain  Assessment and Plan of Treatment: Back Pain  Back pain is very common in adults. The cause of back pain is rarely dangerous and the pain often gets better over time. The cause of your back pain may not be known and may include strain of muscles or ligaments, degeneration of the spinal disks, or arthritis. Occasionally the pain may radiate down your leg(s). Over-the-counter medicines to reduce pain and inflammation are often the most helpful. Stretching and remaining active frequently helps the healing process.   SEEK IMMEDIATE MEDICAL CARE IF YOU HAVE ANY OF THE FOLLOWING SYMPTOMS: bowel or bladder control problems, unusual weakness or numbness in your arms or legs, nausea or vomiting, abdominal pain, fever, dizziness/lightheadedness.        SECONDARY DISCHARGE DIAGNOSES  Diagnosis: Ambulatory dysfunction  Assessment and Plan of Treatment: From back pain  take meds as prescribed. Use assistance to ambulate as needed PRINCIPAL DISCHARGE DIAGNOSIS  Diagnosis: Intractable back pain  Assessment and Plan of Treatment: Back Pain  You were found to have disc disease with spinal narowing/ stenosis . you underwent back surgery which improved your pain . You will be discharged to rehab . follow with your doctor and take your medications as prescribed         SECONDARY DISCHARGE DIAGNOSES  Diagnosis: Ambulatory dysfunction  Assessment and Plan of Treatment: From back pain  take meds as prescribed. Use assistance to ambulate as needed

## 2020-02-07 NOTE — DISCHARGE NOTE PROVIDER - CARE PROVIDERS DIRECT ADDRESSES
[email or web address] [address].[facility] Memorial [email or web address].[facility]

## 2020-02-08 PROCEDURE — 99233 SBSQ HOSP IP/OBS HIGH 50: CPT

## 2020-02-08 PROCEDURE — 76705 ECHO EXAM OF ABDOMEN: CPT | Mod: 26

## 2020-02-08 RX ORDER — ENOXAPARIN SODIUM 100 MG/ML
40 INJECTION SUBCUTANEOUS DAILY
Refills: 0 | Status: DISCONTINUED | OUTPATIENT
Start: 2020-02-08 | End: 2020-02-10

## 2020-02-08 RX ORDER — CHLORHEXIDINE GLUCONATE 213 G/1000ML
1 SOLUTION TOPICAL
Refills: 0 | Status: DISCONTINUED | OUTPATIENT
Start: 2020-02-08 | End: 2020-02-11

## 2020-02-08 RX ADMIN — METHOCARBAMOL 750 MILLIGRAM(S): 500 TABLET, FILM COATED ORAL at 17:07

## 2020-02-08 RX ADMIN — Medication 325 MILLIGRAM(S): at 11:38

## 2020-02-08 RX ADMIN — OXYCODONE HYDROCHLORIDE 10 MILLIGRAM(S): 5 TABLET ORAL at 23:12

## 2020-02-08 RX ADMIN — Medication 650 MILLIGRAM(S): at 11:38

## 2020-02-08 RX ADMIN — PANTOPRAZOLE SODIUM 40 MILLIGRAM(S): 20 TABLET, DELAYED RELEASE ORAL at 06:46

## 2020-02-08 RX ADMIN — Medication 650 MILLIGRAM(S): at 21:43

## 2020-02-08 RX ADMIN — METHOCARBAMOL 750 MILLIGRAM(S): 500 TABLET, FILM COATED ORAL at 06:47

## 2020-02-08 RX ADMIN — GABAPENTIN 200 MILLIGRAM(S): 400 CAPSULE ORAL at 14:05

## 2020-02-08 RX ADMIN — POLYETHYLENE GLYCOL 3350 17 GRAM(S): 17 POWDER, FOR SOLUTION ORAL at 11:38

## 2020-02-08 RX ADMIN — Medication 5 MILLIGRAM(S): at 23:12

## 2020-02-08 RX ADMIN — GABAPENTIN 200 MILLIGRAM(S): 400 CAPSULE ORAL at 21:45

## 2020-02-08 RX ADMIN — Medication 650 MILLIGRAM(S): at 17:07

## 2020-02-08 RX ADMIN — Medication 650 MILLIGRAM(S): at 23:30

## 2020-02-08 RX ADMIN — OXYCODONE HYDROCHLORIDE 10 MILLIGRAM(S): 5 TABLET ORAL at 00:58

## 2020-02-08 RX ADMIN — LISINOPRIL 40 MILLIGRAM(S): 2.5 TABLET ORAL at 06:47

## 2020-02-08 RX ADMIN — Medication 650 MILLIGRAM(S): at 02:38

## 2020-02-08 RX ADMIN — Medication 650 MILLIGRAM(S): at 04:48

## 2020-02-08 RX ADMIN — Medication 12.5 MILLIGRAM(S): at 06:47

## 2020-02-08 RX ADMIN — SIMVASTATIN 80 MILLIGRAM(S): 20 TABLET, FILM COATED ORAL at 23:13

## 2020-02-08 RX ADMIN — SENNA PLUS 2 TABLET(S): 8.6 TABLET ORAL at 21:43

## 2020-02-08 RX ADMIN — OXYCODONE HYDROCHLORIDE 10 MILLIGRAM(S): 5 TABLET ORAL at 06:47

## 2020-02-08 RX ADMIN — METHOCARBAMOL 750 MILLIGRAM(S): 500 TABLET, FILM COATED ORAL at 23:12

## 2020-02-08 RX ADMIN — OXYCODONE HYDROCHLORIDE 10 MILLIGRAM(S): 5 TABLET ORAL at 06:49

## 2020-02-08 RX ADMIN — ENOXAPARIN SODIUM 40 MILLIGRAM(S): 100 INJECTION SUBCUTANEOUS at 11:38

## 2020-02-08 RX ADMIN — METHOCARBAMOL 750 MILLIGRAM(S): 500 TABLET, FILM COATED ORAL at 11:38

## 2020-02-08 RX ADMIN — OXYCODONE HYDROCHLORIDE 10 MILLIGRAM(S): 5 TABLET ORAL at 17:32

## 2020-02-08 RX ADMIN — OXYCODONE HYDROCHLORIDE 10 MILLIGRAM(S): 5 TABLET ORAL at 00:45

## 2020-02-08 RX ADMIN — METHOCARBAMOL 750 MILLIGRAM(S): 500 TABLET, FILM COATED ORAL at 00:59

## 2020-02-08 RX ADMIN — GABAPENTIN 200 MILLIGRAM(S): 400 CAPSULE ORAL at 06:47

## 2020-02-08 RX ADMIN — Medication 650 MILLIGRAM(S): at 06:49

## 2020-02-08 NOTE — PROGRESS NOTE ADULT - SUBJECTIVE AND OBJECTIVE BOX
pt seen and examined.   ambulating.   pain is controlled.   Vital Signs Last 24 Hrs  T(C): 35.7 (08 Feb 2020 04:30), Max: 36.3 (07 Feb 2020 21:50)  T(F): 96.2 (08 Feb 2020 04:30), Max: 97.4 (07 Feb 2020 21:50)  HR: 65 (08 Feb 2020 04:30) (65 - 82)  BP: 130/66 (08 Feb 2020 04:30) (130/66 - 130/-)  BP(mean): --  RR: 18 (08 Feb 2020 04:30) (18 - 18)  SpO2: 95% (07 Feb 2020 21:01) (95% - 95%)    Physical exam:   constitutional NAD, AAOX3, Respiratory  lungs CTA, CVS heart RRR, GI: abdomen Soft NT, ND, BS+, skin: intact  neuro exam non focal.                           11.6   9.40  )-----------( 283      ( 07 Feb 2020 07:09 )             37.2   02-07    139  |  101  |  24<H>  ----------------------------<  104<H>  4.4   |  23  |  1.0    Ca    9.2      07 Feb 2020 07:09  Mg     2.0     02-07    TPro  7.3  /  Alb  4.3  /  TBili  0.3  /  DBili  x   /  AST  21  /  ALT  23  /  AlkPhos  57  02-07    # Intractable back pain secondary to multiple level disc disease; lumbar disc herniation, and spinal stenosis  plan is for surgery on Tuesday (  neurosurgery ) cont current pain management     # AAA, HTN, HLD cont meds    after surgery will need reevaluation for dispo

## 2020-02-09 LAB
ALBUMIN SERPL ELPH-MCNC: 4 G/DL — SIGNIFICANT CHANGE UP (ref 3.5–5.2)
ALP SERPL-CCNC: 56 U/L — SIGNIFICANT CHANGE UP (ref 30–115)
ALT FLD-CCNC: 21 U/L — SIGNIFICANT CHANGE UP (ref 0–41)
ANION GAP SERPL CALC-SCNC: 12 MMOL/L — SIGNIFICANT CHANGE UP (ref 7–14)
AST SERPL-CCNC: 16 U/L — SIGNIFICANT CHANGE UP (ref 0–41)
BASOPHILS # BLD AUTO: 0.05 K/UL — SIGNIFICANT CHANGE UP (ref 0–0.2)
BASOPHILS NFR BLD AUTO: 0.5 % — SIGNIFICANT CHANGE UP (ref 0–1)
BILIRUB SERPL-MCNC: 0.5 MG/DL — SIGNIFICANT CHANGE UP (ref 0.2–1.2)
BUN SERPL-MCNC: 25 MG/DL — HIGH (ref 10–20)
CALCIUM SERPL-MCNC: 9 MG/DL — SIGNIFICANT CHANGE UP (ref 8.5–10.1)
CHLORIDE SERPL-SCNC: 103 MMOL/L — SIGNIFICANT CHANGE UP (ref 98–110)
CO2 SERPL-SCNC: 23 MMOL/L — SIGNIFICANT CHANGE UP (ref 17–32)
CREAT SERPL-MCNC: 0.9 MG/DL — SIGNIFICANT CHANGE UP (ref 0.7–1.5)
EOSINOPHIL # BLD AUTO: 0.27 K/UL — SIGNIFICANT CHANGE UP (ref 0–0.7)
EOSINOPHIL NFR BLD AUTO: 2.4 % — SIGNIFICANT CHANGE UP (ref 0–8)
GLUCOSE SERPL-MCNC: 87 MG/DL — SIGNIFICANT CHANGE UP (ref 70–99)
HCT VFR BLD CALC: 38 % — LOW (ref 42–52)
HGB BLD-MCNC: 11.6 G/DL — LOW (ref 14–18)
IMM GRANULOCYTES NFR BLD AUTO: 0.7 % — HIGH (ref 0.1–0.3)
LYMPHOCYTES # BLD AUTO: 1.99 K/UL — SIGNIFICANT CHANGE UP (ref 1.2–3.4)
LYMPHOCYTES # BLD AUTO: 18 % — LOW (ref 20.5–51.1)
MAGNESIUM SERPL-MCNC: 2.1 MG/DL — SIGNIFICANT CHANGE UP (ref 1.8–2.4)
MCHC RBC-ENTMCNC: 24 PG — LOW (ref 27–31)
MCHC RBC-ENTMCNC: 30.5 G/DL — LOW (ref 32–37)
MCV RBC AUTO: 78.7 FL — LOW (ref 80–94)
MONOCYTES # BLD AUTO: 1.13 K/UL — HIGH (ref 0.1–0.6)
MONOCYTES NFR BLD AUTO: 10.2 % — HIGH (ref 1.7–9.3)
NEUTROPHILS # BLD AUTO: 7.56 K/UL — HIGH (ref 1.4–6.5)
NEUTROPHILS NFR BLD AUTO: 68.2 % — SIGNIFICANT CHANGE UP (ref 42.2–75.2)
NRBC # BLD: 0 /100 WBCS — SIGNIFICANT CHANGE UP (ref 0–0)
PLATELET # BLD AUTO: 248 K/UL — SIGNIFICANT CHANGE UP (ref 130–400)
POTASSIUM SERPL-MCNC: 4.6 MMOL/L — SIGNIFICANT CHANGE UP (ref 3.5–5)
POTASSIUM SERPL-SCNC: 4.6 MMOL/L — SIGNIFICANT CHANGE UP (ref 3.5–5)
PROT SERPL-MCNC: 6.7 G/DL — SIGNIFICANT CHANGE UP (ref 6–8)
RBC # BLD: 4.83 M/UL — SIGNIFICANT CHANGE UP (ref 4.7–6.1)
RBC # FLD: 18 % — HIGH (ref 11.5–14.5)
SODIUM SERPL-SCNC: 138 MMOL/L — SIGNIFICANT CHANGE UP (ref 135–146)
WBC # BLD: 11.08 K/UL — HIGH (ref 4.8–10.8)
WBC # FLD AUTO: 11.08 K/UL — HIGH (ref 4.8–10.8)

## 2020-02-09 PROCEDURE — 99232 SBSQ HOSP IP/OBS MODERATE 35: CPT

## 2020-02-09 RX ORDER — NICOTINE POLACRILEX 2 MG
1 GUM BUCCAL DAILY
Refills: 0 | Status: DISCONTINUED | OUTPATIENT
Start: 2020-02-09 | End: 2020-02-11

## 2020-02-09 RX ORDER — GABAPENTIN 400 MG/1
300 CAPSULE ORAL EVERY 8 HOURS
Refills: 0 | Status: DISCONTINUED | OUTPATIENT
Start: 2020-02-09 | End: 2020-02-11

## 2020-02-09 RX ADMIN — METHOCARBAMOL 750 MILLIGRAM(S): 500 TABLET, FILM COATED ORAL at 23:20

## 2020-02-09 RX ADMIN — OXYCODONE HYDROCHLORIDE 10 MILLIGRAM(S): 5 TABLET ORAL at 00:41

## 2020-02-09 RX ADMIN — Medication 12.5 MILLIGRAM(S): at 05:23

## 2020-02-09 RX ADMIN — Medication 650 MILLIGRAM(S): at 23:35

## 2020-02-09 RX ADMIN — Medication 325 MILLIGRAM(S): at 11:29

## 2020-02-09 RX ADMIN — METHOCARBAMOL 750 MILLIGRAM(S): 500 TABLET, FILM COATED ORAL at 05:23

## 2020-02-09 RX ADMIN — Medication 650 MILLIGRAM(S): at 05:47

## 2020-02-09 RX ADMIN — SIMVASTATIN 80 MILLIGRAM(S): 20 TABLET, FILM COATED ORAL at 22:21

## 2020-02-09 RX ADMIN — Medication 650 MILLIGRAM(S): at 22:22

## 2020-02-09 RX ADMIN — GABAPENTIN 200 MILLIGRAM(S): 400 CAPSULE ORAL at 05:23

## 2020-02-09 RX ADMIN — Medication 5 MILLIGRAM(S): at 22:21

## 2020-02-09 RX ADMIN — POLYETHYLENE GLYCOL 3350 17 GRAM(S): 17 POWDER, FOR SOLUTION ORAL at 11:29

## 2020-02-09 RX ADMIN — Medication 1 PATCH: at 22:29

## 2020-02-09 RX ADMIN — METHOCARBAMOL 750 MILLIGRAM(S): 500 TABLET, FILM COATED ORAL at 11:29

## 2020-02-09 RX ADMIN — GABAPENTIN 300 MILLIGRAM(S): 400 CAPSULE ORAL at 22:22

## 2020-02-09 RX ADMIN — PANTOPRAZOLE SODIUM 40 MILLIGRAM(S): 20 TABLET, DELAYED RELEASE ORAL at 05:24

## 2020-02-09 RX ADMIN — LISINOPRIL 40 MILLIGRAM(S): 2.5 TABLET ORAL at 05:23

## 2020-02-09 RX ADMIN — Medication 650 MILLIGRAM(S): at 01:22

## 2020-02-09 RX ADMIN — ENOXAPARIN SODIUM 40 MILLIGRAM(S): 100 INJECTION SUBCUTANEOUS at 11:30

## 2020-02-09 RX ADMIN — Medication 1 PATCH: at 11:29

## 2020-02-09 RX ADMIN — METHOCARBAMOL 750 MILLIGRAM(S): 500 TABLET, FILM COATED ORAL at 17:58

## 2020-02-09 RX ADMIN — Medication 650 MILLIGRAM(S): at 11:29

## 2020-02-09 RX ADMIN — OXYCODONE HYDROCHLORIDE 10 MILLIGRAM(S): 5 TABLET ORAL at 14:03

## 2020-02-09 RX ADMIN — Medication 650 MILLIGRAM(S): at 05:23

## 2020-02-09 RX ADMIN — GABAPENTIN 300 MILLIGRAM(S): 400 CAPSULE ORAL at 14:03

## 2020-02-09 RX ADMIN — Medication 650 MILLIGRAM(S): at 03:05

## 2020-02-09 RX ADMIN — SENNA PLUS 2 TABLET(S): 8.6 TABLET ORAL at 22:21

## 2020-02-09 RX ADMIN — Medication 5 MILLIGRAM(S): at 05:22

## 2020-02-09 NOTE — PROGRESS NOTE ADULT - SUBJECTIVE AND OBJECTIVE BOX
DEREKKALPANAJERRY RIDDLEO  74y  Male      Patient is a 74y old  Male who presents with a chief complaint of Back Pain (09 Feb 2020 08:25)      INTERVAL HPI/OVERNIGHT EVENTS: reports lower lumbar pain continues. worst when he tries to stand, then there is numbness and tingling involving entire LLE which shortly after turns into debilitating pain. awaiting possible surgery for Tuesday      REVIEW OF SYSTEMS:  as above  All other review of systems negative    T(C): 35.9 (02-09-20 @ 05:17), Max: 36.6 (02-08-20 @ 21:21)  HR: 74 (02-09-20 @ 05:17) (74 - 82)  BP: 130/87 (02-09-20 @ 05:17) (110/66 - 130/87)  RR: 18 (02-09-20 @ 05:17) (18 - 20)  SpO2: 96% (02-08-20 @ 20:51) (96% - 96%)  Wt(kg): --Vital Signs Last 24 Hrs  T(C): 35.9 (09 Feb 2020 05:17), Max: 36.6 (08 Feb 2020 21:21)  T(F): 96.7 (09 Feb 2020 05:17), Max: 97.8 (08 Feb 2020 21:21)  HR: 74 (09 Feb 2020 05:17) (74 - 82)  BP: 130/87 (09 Feb 2020 05:17) (110/66 - 130/87)  BP(mean): --  RR: 18 (09 Feb 2020 05:17) (18 - 20)  SpO2: 96% (08 Feb 2020 20:51) (96% - 96%)        PHYSICAL EXAM:  GENERAL: NAD  PSYCH: no agitation, baseline mentation  NERVOUS SYSTEM:  Alert & Oriented X3, no new focal deficits  PULMONARY: coarse chronic likely smoking related changes, no active wheeze  CARDIOVASCULAR: Regular rate and rhythm; No murmurs, rubs, or gallops  GI: Soft, Nontender, Nondistended; Bowel sounds present  EXTREMITIES:  2+ Peripheral Pulses, No clubbing, cyanosis, or edema  MUSCULOSKELETAL: L straight leg test negative but lower lumbar tenderness to palpation at midline, and subjective complaints when standing as above    Consultant(s) Notes Reviewed:  [x ] YES  [ ] NO    Discussed with Consultants/Other Providers [ x] YES     LABS                          11.6   11.08 )-----------( 248      ( 09 Feb 2020 08:01 )             38.0     02-09    138  |  103  |  25<H>  ----------------------------<  87  4.6   |  23  |  0.9    Ca    9.0      09 Feb 2020 08:01  Mg     2.1     02-09    TPro  6.7  /  Alb  4.0  /  TBili  0.5  /  DBili  x   /  AST  16  /  ALT  21  /  AlkPhos  56  02-09          Lactate Trend        CAPILLARY BLOOD GLUCOSE            RADIOLOGY & ADDITIONAL TESTS:    Imaging Personally Reviewed:  [ ] YES  [ ] NO    HEALTH ISSUES - PROBLEM Dx:  History of degenerative disc disease: History of degenerative disc disease

## 2020-02-09 NOTE — PROGRESS NOTE ADULT - SUBJECTIVE AND OBJECTIVE BOX
MEDARDO GUTIERREZ 74y Male  MRN#: [id number]     SUBJECTIVE  Patient is a 74y old Male who presents with a chief complaint of Back Pain (08 Feb 2020 10:57)  Today is hospital day 5d, and this morning he is lying in bed without distress.   No acute overnight events.   looknig forward to surgery  no cp no sob  no f./c/n/v  no abodminal pain  says that w/o pain meds would be intractable pain  has qs for nsx team said that will call them so they can answer    OBJECTIVE  PAST MEDICAL & SURGICAL HISTORY  History of degenerative disc disease  Elevated lipids  Abdominal aortic aneurysm  HTN (hypertension)    ALLERGIES:  No Known Allergies    MEDICATIONS:  STANDING MEDICATIONS  acetaminophen   Tablet .. 650 milliGRAM(s) Oral every 4 hours  chlorhexidine 4% Liquid 1 Application(s) Topical <User Schedule>  enoxaparin Injectable 40 milliGRAM(s) SubCutaneous daily  ferrous    sulfate 325 milliGRAM(s) Oral daily  gabapentin 200 milliGRAM(s) Oral every 8 hours  hydrochlorothiazide 12.5 milliGRAM(s) Oral daily  lisinopril 40 milliGRAM(s) Oral daily  melatonin 5 milliGRAM(s) Oral at bedtime  methocarbamol 750 milliGRAM(s) Oral every 6 hours  pantoprazole    Tablet 40 milliGRAM(s) Oral before breakfast  polyethylene glycol 3350 17 Gram(s) Oral daily  senna 2 Tablet(s) Oral at bedtime  simvastatin Oral Tab/Cap - Peds 80 milliGRAM(s) Oral at bedtime    PRN MEDICATIONS  aluminum hydroxide/magnesium hydroxide/simethicone Suspension 30 milliLiter(s) Oral every 4 hours PRN  bisacodyl 5 milliGRAM(s) Oral every 12 hours PRN  oxyCODONE    IR 10 milliGRAM(s) Oral every 4 hours PRN    HOME MEDICATIONS  Home Medications:  aspirin 325 mg oral delayed release tablet: 1 tab(s) orally once a day (04 Feb 2020 15:52)  gabapentin 100 mg oral capsule: 1 cap(s) orally 3 times a day (04 Feb 2020 15:51)  ibuprofen 400 mg oral tablet: 1 tab(s) orally every 6 hours (04 Feb 2020 15:51)  Lexapro 20 mg oral tablet: 1 tab(s) orally once a day (04 Feb 2020 15:52)  lisinopril 20 mg oral tablet: 1 tab(s) orally every 12 hours (04 Feb 2020 15:52)  pantoprazole 40 mg oral delayed release tablet: 1 tab(s) orally once a day (04 Feb 2020 15:51)  simvastatin 80 mg oral tablet: 1 tab(s) orally once a day (at bedtime) (04 Feb 2020 15:52)  traMADol 50 mg oral tablet: 1 tab(s) orally every 4 hours (04 Feb 2020 15:51)      VITAL SIGNS: Last 24 Hours  T(C): 35.9 (09 Feb 2020 05:17), Max: 36.6 (08 Feb 2020 21:21)  T(F): 96.7 (09 Feb 2020 05:17), Max: 97.8 (08 Feb 2020 21:21)  HR: 74 (09 Feb 2020 05:17) (74 - 82)  BP: 130/87 (09 Feb 2020 05:17) (110/66 - 130/87)  BP(mean): --  RR: 18 (09 Feb 2020 05:17) (18 - 20)  SpO2: 96% (08 Feb 2020 20:51) (96% - 96%)        PHYSICAL EXAM:  PHYSICAL EXAM:  GENERAL: NAD, AAO x 4, 74y M  HEAD:  Atraumatic, Normocephalic  EYES: EOMI, conjunctiva clear and sclera white  NECK: Supple, No JVD  CHEST/LUNG: Clear to auscultation bilaterally; No wheeze; No crackles; No accessory muscles used  HEART: Regular rate and rhythm; No murmurs;   ABDOMEN: Soft, Nontender, Nondistended; Bowel sounds present; No guarding  EXTREMITIES:  2+ Peripheral Pulses, No cyanosis or edema  NEUROLOGY: non-focal    ADMISSION SUMMARY  Patient is a 74y old Male who presents with a chief complaint of Back Pain (08 Feb 2020 10:57)

## 2020-02-09 NOTE — PROGRESS NOTE ADULT - ASSESSMENT
75yo M here with Intractable back pain secondary to multiple level disc disease; L2-3 disc james; spinal stenosis; b/l (L>R) foraminal stenosis at [facility], smoker    c/w current analgesia, however uptitrating gabapentin for neuropathic component of pain radiating and involving the LLE  pending probable neurosurgical intervention on Tuesday  ordering nicotine patch for patient- smoking cessation counselling and support provided  coags and active T+S for pending procedure  DVT ppx        #Progress Note Handoff    Pending (specify):  Consults_________, Tests________, Test Results_______, Other_analgesia, probable neurosurgical intervention tuesday________    Family discussion: plan of care discussed with patient as above    Disposition: acute for now

## 2020-02-09 NOTE — PROGRESS NOTE ADULT - ASSESSMENT
Patient is a 74y old Male who presents with a chief complaint of Currently admitted to medicine with the primary diagnosis of Intractable back pain. mri shows multiple disc herniation but with likely one large one at l2-3 causing symptoms. will go to surgery on tuesday?    # Intractable back pain secondary to multiple level disc disease   - Neurology evaluation patient follows with dr wiliam stinson appreciated  - Neurosurgery evaluation , will go to surgery on tuesday 2/11?  -Spoke with dr holguin from neuro IR, sudhir SORENSON if nsg does not recommend surgery  - Pain management milad appreciated  - In the meantime pain regimen: Gabapentin, Tylenol Oxycodone ER and morphine IV for breakthrough pain. Will avoid NSAIDS given GERD and ?CAD     - Bowel regimen   - PT/Rehab     # Microcytic Anemia - iron deficiency  - No evidence of melena   - Iron studies show DELLA, started on supplementaal iron  - May need outpatient C scope     # HTN   - Continue home meds   - Patient was on aspirin 325 at home unclear reason. Will continue 81 for now (no on record history of CAD)     # DLD  - Continue home meds     # GERD  - Continue home meds     # AAA - Stable being monitored   - Outpatient follow up with Dr. Sosa     GI PPX: Pantoprazole   DVT PPX: Lovenox     DIET: DASH    Pending:   neuriosurg  dispo

## 2020-02-10 LAB
ALBUMIN SERPL ELPH-MCNC: 3.8 G/DL — SIGNIFICANT CHANGE UP (ref 3.5–5.2)
ALP SERPL-CCNC: 56 U/L — SIGNIFICANT CHANGE UP (ref 30–115)
ALT FLD-CCNC: 17 U/L — SIGNIFICANT CHANGE UP (ref 0–41)
ANION GAP SERPL CALC-SCNC: 11 MMOL/L — SIGNIFICANT CHANGE UP (ref 7–14)
ANION GAP SERPL CALC-SCNC: 11 MMOL/L — SIGNIFICANT CHANGE UP (ref 7–14)
APTT BLD: 31.1 SEC — SIGNIFICANT CHANGE UP (ref 27–39.2)
AST SERPL-CCNC: 13 U/L — SIGNIFICANT CHANGE UP (ref 0–41)
BASOPHILS # BLD AUTO: 0.04 K/UL — SIGNIFICANT CHANGE UP (ref 0–0.2)
BASOPHILS NFR BLD AUTO: 0.4 % — SIGNIFICANT CHANGE UP (ref 0–1)
BILIRUB SERPL-MCNC: 0.4 MG/DL — SIGNIFICANT CHANGE UP (ref 0.2–1.2)
BLD GP AB SCN SERPL QL: SIGNIFICANT CHANGE UP
BLD GP AB SCN SERPL QL: SIGNIFICANT CHANGE UP
BUN SERPL-MCNC: 25 MG/DL — HIGH (ref 10–20)
BUN SERPL-MCNC: 27 MG/DL — HIGH (ref 10–20)
CALCIUM SERPL-MCNC: 8.7 MG/DL — SIGNIFICANT CHANGE UP (ref 8.5–10.1)
CALCIUM SERPL-MCNC: 9 MG/DL — SIGNIFICANT CHANGE UP (ref 8.5–10.1)
CHLORIDE SERPL-SCNC: 100 MMOL/L — SIGNIFICANT CHANGE UP (ref 98–110)
CHLORIDE SERPL-SCNC: 102 MMOL/L — SIGNIFICANT CHANGE UP (ref 98–110)
CO2 SERPL-SCNC: 23 MMOL/L — SIGNIFICANT CHANGE UP (ref 17–32)
CO2 SERPL-SCNC: 25 MMOL/L — SIGNIFICANT CHANGE UP (ref 17–32)
CREAT SERPL-MCNC: 1.1 MG/DL — SIGNIFICANT CHANGE UP (ref 0.7–1.5)
CREAT SERPL-MCNC: 1.2 MG/DL — SIGNIFICANT CHANGE UP (ref 0.7–1.5)
EOSINOPHIL # BLD AUTO: 0.31 K/UL — SIGNIFICANT CHANGE UP (ref 0–0.7)
EOSINOPHIL NFR BLD AUTO: 3 % — SIGNIFICANT CHANGE UP (ref 0–8)
GLUCOSE SERPL-MCNC: 109 MG/DL — HIGH (ref 70–99)
GLUCOSE SERPL-MCNC: 128 MG/DL — HIGH (ref 70–99)
HCT VFR BLD CALC: 36.6 % — LOW (ref 42–52)
HGB BLD-MCNC: 11.1 G/DL — LOW (ref 14–18)
IMM GRANULOCYTES NFR BLD AUTO: 1.2 % — HIGH (ref 0.1–0.3)
INR BLD: 1.06 RATIO — SIGNIFICANT CHANGE UP (ref 0.65–1.3)
LYMPHOCYTES # BLD AUTO: 2.33 K/UL — SIGNIFICANT CHANGE UP (ref 1.2–3.4)
LYMPHOCYTES # BLD AUTO: 22.8 % — SIGNIFICANT CHANGE UP (ref 20.5–51.1)
MAGNESIUM SERPL-MCNC: 2.1 MG/DL — SIGNIFICANT CHANGE UP (ref 1.8–2.4)
MCHC RBC-ENTMCNC: 23.7 PG — LOW (ref 27–31)
MCHC RBC-ENTMCNC: 30.3 G/DL — LOW (ref 32–37)
MCV RBC AUTO: 78.2 FL — LOW (ref 80–94)
MONOCYTES # BLD AUTO: 1.15 K/UL — HIGH (ref 0.1–0.6)
MONOCYTES NFR BLD AUTO: 11.3 % — HIGH (ref 1.7–9.3)
NEUTROPHILS # BLD AUTO: 6.27 K/UL — SIGNIFICANT CHANGE UP (ref 1.4–6.5)
NEUTROPHILS NFR BLD AUTO: 61.3 % — SIGNIFICANT CHANGE UP (ref 42.2–75.2)
NRBC # BLD: 0 /100 WBCS — SIGNIFICANT CHANGE UP (ref 0–0)
PLATELET # BLD AUTO: 228 K/UL — SIGNIFICANT CHANGE UP (ref 130–400)
POTASSIUM SERPL-MCNC: 4.8 MMOL/L — SIGNIFICANT CHANGE UP (ref 3.5–5)
POTASSIUM SERPL-MCNC: 5.3 MMOL/L — HIGH (ref 3.5–5)
POTASSIUM SERPL-SCNC: 4.8 MMOL/L — SIGNIFICANT CHANGE UP (ref 3.5–5)
POTASSIUM SERPL-SCNC: 5.3 MMOL/L — HIGH (ref 3.5–5)
PROT SERPL-MCNC: 6.6 G/DL — SIGNIFICANT CHANGE UP (ref 6–8)
PROTHROM AB SERPL-ACNC: 12.2 SEC — SIGNIFICANT CHANGE UP (ref 9.95–12.87)
RBC # BLD: 4.68 M/UL — LOW (ref 4.7–6.1)
RBC # FLD: 18.4 % — HIGH (ref 11.5–14.5)
SODIUM SERPL-SCNC: 136 MMOL/L — SIGNIFICANT CHANGE UP (ref 135–146)
SODIUM SERPL-SCNC: 136 MMOL/L — SIGNIFICANT CHANGE UP (ref 135–146)
WBC # BLD: 10.22 K/UL — SIGNIFICANT CHANGE UP (ref 4.8–10.8)
WBC # FLD AUTO: 10.22 K/UL — SIGNIFICANT CHANGE UP (ref 4.8–10.8)

## 2020-02-10 PROCEDURE — 99233 SBSQ HOSP IP/OBS HIGH 50: CPT

## 2020-02-10 RX ORDER — SODIUM CHLORIDE 9 MG/ML
1000 INJECTION INTRAMUSCULAR; INTRAVENOUS; SUBCUTANEOUS
Refills: 0 | Status: DISCONTINUED | OUTPATIENT
Start: 2020-02-11 | End: 2020-02-11

## 2020-02-10 RX ADMIN — SENNA PLUS 2 TABLET(S): 8.6 TABLET ORAL at 21:51

## 2020-02-10 RX ADMIN — Medication 650 MILLIGRAM(S): at 17:20

## 2020-02-10 RX ADMIN — Medication 650 MILLIGRAM(S): at 03:03

## 2020-02-10 RX ADMIN — Medication 650 MILLIGRAM(S): at 14:05

## 2020-02-10 RX ADMIN — SIMVASTATIN 80 MILLIGRAM(S): 20 TABLET, FILM COATED ORAL at 21:52

## 2020-02-10 RX ADMIN — Medication 650 MILLIGRAM(S): at 11:28

## 2020-02-10 RX ADMIN — POLYETHYLENE GLYCOL 3350 17 GRAM(S): 17 POWDER, FOR SOLUTION ORAL at 11:25

## 2020-02-10 RX ADMIN — GABAPENTIN 300 MILLIGRAM(S): 400 CAPSULE ORAL at 21:52

## 2020-02-10 RX ADMIN — Medication 650 MILLIGRAM(S): at 05:45

## 2020-02-10 RX ADMIN — Medication 650 MILLIGRAM(S): at 02:28

## 2020-02-10 RX ADMIN — Medication 650 MILLIGRAM(S): at 22:45

## 2020-02-10 RX ADMIN — Medication 1 PATCH: at 11:31

## 2020-02-10 RX ADMIN — Medication 650 MILLIGRAM(S): at 21:51

## 2020-02-10 RX ADMIN — Medication 1 PATCH: at 05:46

## 2020-02-10 RX ADMIN — METHOCARBAMOL 750 MILLIGRAM(S): 500 TABLET, FILM COATED ORAL at 11:27

## 2020-02-10 RX ADMIN — GABAPENTIN 300 MILLIGRAM(S): 400 CAPSULE ORAL at 05:45

## 2020-02-10 RX ADMIN — OXYCODONE HYDROCHLORIDE 10 MILLIGRAM(S): 5 TABLET ORAL at 21:53

## 2020-02-10 RX ADMIN — OXYCODONE HYDROCHLORIDE 10 MILLIGRAM(S): 5 TABLET ORAL at 21:52

## 2020-02-10 RX ADMIN — METHOCARBAMOL 750 MILLIGRAM(S): 500 TABLET, FILM COATED ORAL at 18:30

## 2020-02-10 RX ADMIN — GABAPENTIN 300 MILLIGRAM(S): 400 CAPSULE ORAL at 14:05

## 2020-02-10 RX ADMIN — Medication 1 PATCH: at 21:51

## 2020-02-10 RX ADMIN — Medication 5 MILLIGRAM(S): at 21:54

## 2020-02-10 RX ADMIN — PANTOPRAZOLE SODIUM 40 MILLIGRAM(S): 20 TABLET, DELAYED RELEASE ORAL at 05:46

## 2020-02-10 RX ADMIN — Medication 1 PATCH: at 11:35

## 2020-02-10 RX ADMIN — METHOCARBAMOL 750 MILLIGRAM(S): 500 TABLET, FILM COATED ORAL at 05:45

## 2020-02-10 RX ADMIN — LISINOPRIL 40 MILLIGRAM(S): 2.5 TABLET ORAL at 05:45

## 2020-02-10 RX ADMIN — ENOXAPARIN SODIUM 40 MILLIGRAM(S): 100 INJECTION SUBCUTANEOUS at 11:31

## 2020-02-10 RX ADMIN — Medication 650 MILLIGRAM(S): at 08:05

## 2020-02-10 RX ADMIN — Medication 12.5 MILLIGRAM(S): at 05:45

## 2020-02-10 RX ADMIN — Medication 650 MILLIGRAM(S): at 11:24

## 2020-02-10 RX ADMIN — Medication 325 MILLIGRAM(S): at 11:24

## 2020-02-10 NOTE — PROGRESS NOTE ADULT - SUBJECTIVE AND OBJECTIVE BOX
Surgery:     L2-3 Laminectomy    HPI  Procedure:  INTRACTABLE BACK PAIN AMBULATORY DYSFUNCTION  ^INTRACTABLE BACK PAIN AMBULATORY DYSFUNCTION  Family history of degenerative disc disease  No pertinent family history in first degree relatives  Handoff  MEWS Score  History of degenerative disc disease  Elevated lipids  Abdominal aortic aneurysm  HTN (hypertension)  Intractable back pain  History of degenerative disc disease  BACK PAIN 1X MONTH  Ambulatory dysfunction    acetaminophen   Tablet .. 650 milliGRAM(s) Oral every 4 hours  aluminum hydroxide/magnesium hydroxide/simethicone Suspension 30 milliLiter(s) Oral every 4 hours PRN  bisacodyl 5 milliGRAM(s) Oral every 12 hours PRN  chlorhexidine 4% Liquid 1 Application(s) Topical <User Schedule>  ferrous    sulfate 325 milliGRAM(s) Oral daily  gabapentin 300 milliGRAM(s) Oral every 8 hours  hydrochlorothiazide 12.5 milliGRAM(s) Oral daily  lisinopril 40 milliGRAM(s) Oral daily  melatonin 5 milliGRAM(s) Oral at bedtime  methocarbamol 750 milliGRAM(s) Oral every 6 hours  nicotine -  14 mG/24Hr(s) Patch 1 patch Transdermal daily  oxyCODONE    IR 10 milliGRAM(s) Oral every 4 hours PRN  pantoprazole    Tablet 40 milliGRAM(s) Oral before breakfast  polyethylene glycol 3350 17 Gram(s) Oral daily  senna 2 Tablet(s) Oral at bedtime  simvastatin Oral Tab/Cap - Peds 80 milliGRAM(s) Oral at bedtime    No Known Allergies      02-10    136  |  100  |  25<H>  ----------------------------<  128<H>  5.3<H>   |  25  |  1.1    Ca    9.0      10 Feb 2020 06:38  Mg     2.1     02-10    TPro  6.6  /  Alb  3.8  /  TBili  0.4  /  DBili  x   /  AST  13  /  ALT  17  /  AlkPhos  56  02-10    CBC Full  -  ( 10 Feb 2020 06:38 )  WBC Count : 10.22 K/uL  RBC Count : 4.68 M/uL  Hemoglobin : 11.1 g/dL  Hematocrit : 36.6 %  Platelet Count - Automated : 228 K/uL  Mean Cell Volume : 78.2 fL  Mean Cell Hemoglobin : 23.7 pg  Mean Cell Hemoglobin Concentration : 30.3 g/dL  Auto Neutrophil # : 6.27 K/uL  Auto Lymphocyte # : 2.33 K/uL  Auto Monocyte # : 1.15 K/uL  Auto Eosinophil # : 0.31 K/uL  Auto Basophil # : 0.04 K/uL  Auto Neutrophil % : 61.3 %  Auto Lymphocyte % : 22.8 %  Auto Monocyte % : 11.3 %  Auto Eosinophil % : 3.0 %  Auto Basophil % : 0.4 %    PT/INR - ( 10 Feb 2020 06:38 )   PT: 12.20 sec;   INR: 1.06 ratio    PTT - ( 10 Feb 2020 06:38 )  PTT:31.1 sec  CXR:   < from: Xray Chest 1 View- PORTABLE-Routine (20 @ 15:16) >  No radiographic evidence of acute cardiopulmonary disease.    < end of copied text >    EKG: < from: 12 Lead ECG (20 @ 14:35) >  Diagnosis Line Normal sinus rhythm  Nonspecific T wave abnormality  Abnormal ECG    < end of copied text >    Medical Clearances: Low Risk    Last dose of antiplatelet/anticoagulation dru.10.20    Orders: NPO after midnight               Consent: To be obtained

## 2020-02-10 NOTE — PROGRESS NOTE ADULT - ASSESSMENT
74y old Male admitted to medicine with the primary diagnosis of Intractable back pain    # Intractable back pain secondary to multiple level vert disease; L2-3 disc james; spinal stenosis; b/l (L>R) foraminal stenosis at [facility]  Neuro - d/w Dr Wagner - no further w/u; tx pain -  NSx (Dr Blackman) - noted - surg alondra at 7:30 am: NPO p MN; IVFs in am  Neuro IR (Dr Campos) - spoke w/ NSx - proceed to sx directly  Pain mngmt noted  need rehab eval after sx    # Pre-op eval  Chuck RCRI = 0  Clin Risk: HTN, iron def anemia (mild); AAA; CKD-2  Surg risk: intermed  fxn status: > 4 mets (was still working 1 wk PTA)    ECG: NSR no acute isch changes, nl axis  CXR: NAPD    Overall risk for back surgery should be low in this pt.  May proceed to OR tomorrow.    # pain reg per pain mngmt  Tylenol 650mg po q4 - around the clock  gabapentin 200 milliGRAM(s) Oral three times a day - if tolerating, increase to 300mg po q8 tomorrow, if pain not controlled  oxyCODONE IR 10 milliGRAM(s) Oral every 4 hours PRN sev pain  robaxin 750mg po q6  make Warm compress to the left lower back Q4hr [id number] on/off for an hour  DC morphine; DC traMADol   avoid NSAIDS given GERD and anemia  cont bowel regimen     # Microcytic Anemia 2/2 DELLA - unsure of cause  no evidence of bleeding or bruising   will need outpt GI eval for scopes and eval for H pyori  cont iron supplementation   hold asa    # HTN - better  Continue HCTZ and ACEI     # hyperkalemia - mild  no change in meds (prob mild hemolysis during lab draw)  sx is fine w/ K < 5.6    # 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 Sosa     # DVT ppx: lovenox (placed on hold for sx alondra)    Disposition: f/u Nsx for back sx alondra; tx post-op pain; rehab f/u after OR; outpt w/u for DELLA    will see how pt does post-op - perhaps he'll go to [facility] rehab (we'll see).

## 2020-02-10 NOTE — PROGRESS NOTE ADULT - SUBJECTIVE AND OBJECTIVE BOX
MEDARDO GUTIERREZ  74y  Male  ***My note supersedes ALL resident notes that I sign.  My corrections for their notes are in my note.***    I can be reached directly on [facility] [id number]. My office number is [phone]. My personal cell number is [phone].    INTERVAL EVENTS: Here for f/u of back pain. Pt ready for surg alondra.    T(F): 96.6 (02-10-20 @ 13:44), Max: 96.7 (02-10-20 @ 05:28)  HR: 77 (02-10-20 @ 13:44) (63 - 80)  BP: 131/73 (02-10-20 @ 13:44) (116/69 - 140/67)  RR: 20 (02-10-20 @ 13:44) (18 - 20)  SpO2: 97% (02-09-20 @ 19:37) (97% - 97%)    Gen: NAD; fairly comfortable and mobile in bed  HEENT: WNL  Neck: no JVD, no Nodes  lungs: clr  hrt: s1 s2 rrr  abd soft nt/nd  ext no edema, no c/c  neuro: aa ox3, CN intact, pt has excellent AROM of legs at hips, knees and ankles, no foot drop    LABS:                        11.1    (    78.2   10.22 )-----------( ---------      228      ( 10 Feb 2020 06:38 )             36.6    (    18.4     Hemoglobin: 11.1 g/dL (02-10 @ 06:38)  Hemoglobin: 11.6 g/dL (02-09 @ 08:01)  Hemoglobin: 11.6 g/dL (02-07 @ 07:09)    136   (   100   (   128      02-10-20 @ 06:38  ----------------------               5.3   (   25   (   25                             -----                        1.1  Ca  9.0   Mg  2.1    P   --     LFT  6.6  (  0.4  (  13       02-10-20 @ 06:38  -------------------------  3.8  (  56  (  17    PT/INR - ( 10 Feb 2020 06:38 )   PT: 12.20 sec;   INR: 1.06 ratio    PTT - ( 10 Feb 2020 06:38 )  PTT:31.1 sec    RADIOLOGY & ADDITIONAL TESTS:      MEDICATIONS:    acetaminophen   Tablet .. 650 milliGRAM(s) Oral every 4 hours  aluminum hydroxide/magnesium hydroxide/simethicone Suspension 30 milliLiter(s) Oral every 4 hours PRN  bisacodyl 5 milliGRAM(s) Oral every 12 hours PRN  chlorhexidine 4% Liquid 1 Application(s) Topical <User Schedule>  enoxaparin Injectable 40 milliGRAM(s) SubCutaneous daily  ferrous    sulfate 325 milliGRAM(s) Oral daily  gabapentin 300 milliGRAM(s) Oral every 8 hours  hydrochlorothiazide 12.5 milliGRAM(s) Oral daily  lisinopril 40 milliGRAM(s) Oral daily  melatonin 5 milliGRAM(s) Oral at bedtime  methocarbamol 750 milliGRAM(s) Oral every 6 hours  nicotine -  14 mG/24Hr(s) Patch 1 patch Transdermal daily  oxyCODONE    IR 10 milliGRAM(s) Oral every 4 hours PRN  pantoprazole    Tablet 40 milliGRAM(s) Oral before breakfast  polyethylene glycol 3350 17 Gram(s) Oral daily  senna 2 Tablet(s) Oral at bedtime  simvastatin Oral Tab/Cap - Peds 80 milliGRAM(s) Oral at bedtime

## 2020-02-10 NOTE — PROGRESS NOTE ADULT - ASSESSMENT
24H events:    Patient is a 74y old Male who presents with a chief complaint of Back Pain (09 Feb 2020 12:39)    Primary diagnosis of Intractable back pain     Today is hospital day 6d. This morning patient was seen and examined at bedside, resting comfortably in bed.      PAST MEDICAL & SURGICAL HISTORY  History of degenerative disc disease  Elevated lipids  Abdominal aortic aneurysm  HTN (hypertension)    SOCIAL HISTORY:  Social History:  Active smoker (6-7 cigarettes daily)   Social drinker   Denies illicit drugs (04 Feb 2020 14:54)      ALLERGIES:  No Known Allergies    MEDICATIONS:  STANDING MEDICATIONS  acetaminophen   Tablet .. 650 milliGRAM(s) Oral every 4 hours  chlorhexidine 4% Liquid 1 Application(s) Topical <User Schedule>  enoxaparin Injectable 40 milliGRAM(s) SubCutaneous daily  ferrous    sulfate 325 milliGRAM(s) Oral daily  gabapentin 300 milliGRAM(s) Oral every 8 hours  hydrochlorothiazide 12.5 milliGRAM(s) Oral daily  lisinopril 40 milliGRAM(s) Oral daily  melatonin 5 milliGRAM(s) Oral at bedtime  methocarbamol 750 milliGRAM(s) Oral every 6 hours  nicotine -  14 mG/24Hr(s) Patch 1 patch Transdermal daily  pantoprazole    Tablet 40 milliGRAM(s) Oral before breakfast  polyethylene glycol 3350 17 Gram(s) Oral daily  senna 2 Tablet(s) Oral at bedtime  simvastatin Oral Tab/Cap - Peds 80 milliGRAM(s) Oral at bedtime    PRN MEDICATIONS  aluminum hydroxide/magnesium hydroxide/simethicone Suspension 30 milliLiter(s) Oral every 4 hours PRN  bisacodyl 5 milliGRAM(s) Oral every 12 hours PRN  oxyCODONE    IR 10 milliGRAM(s) Oral every 4 hours PRN    VITALS:   T(F): 96.7  HR: 63  BP: 116/69  RR: 18  SpO2: 97%    LABS:                        11.1   10.22 )-----------( 228      ( 10 Feb 2020 06:38 )             36.6     02-10    136  |  100  |  25<H>  ----------------------------<  128<H>  5.3<H>   |  25  |  1.1    Ca    9.0      10 Feb 2020 06:38  Mg     2.1     02-10    TPro  6.6  /  Alb  3.8  /  TBili  0.4  /  DBili  x   /  AST  13  /  ALT  17  /  AlkPhos  56  02-10    PT/INR - ( 10 Feb 2020 06:38 )   PT: 12.20 sec;   INR: 1.06 ratio         PTT - ( 10 Feb 2020 06:38 )  PTT:31.1 sec              RADIOLOGY:  < from: US Abdomen Limited (02.08.20 @ 11:05) >  IMPRESSION:    Unremarkable right upper quadrant ultrasound.  Evaluation of the pancreas obscured by overlying bowel gas.    < end of copied text >      Outpatient MRI impression:   1-Multilevel degenerative disc disease without evidence of fracture or bone marrow edema   2-L2-L3 demonstrates an extruded disc herniation with focally involving the left paracentral and articular recess regions which demonstrates caudal extension posterior to the L3 vertebral boy. The dis material measures 2.3X0.98 cm with contact and displacement of the intradural left L3 nerve root. There is morederate central spinal canal stenosis with moderate narrowing of the left neuroforamen and mild to moderate narrowing of the righ neuroforamen   3-L3-4 space demonstrates contact of the exiting left L3  nerve root. There is mild to moderate central spinal canal stenosis with mild to moderate narrowing of the left neuroforamen and mild narrowing of the the right neuroforamen   4-L4-5 space demonstrates contact of the intradural L5 nerve roots and the exiting L4 nerve roots. There is facet arthropathy and hypertrophy of the ligamentum flavum more prominent on the left. There is moderate to severe central spinal canal stenosis with moderate to severe narrowing of the right neuroforamen and severe narrowing of the left neuroforamen   5-L5-S1 disc space demonstrates contact and displacement and the intradural left S1 nerve root. There is narrowing of the left articular recess with moderate narrowing of the left neuroforamen and mild narrowing of the right neuroforamen       PHYSICAL EXAM:  GENERAL: NAD  HEAD:  Atraumatic, Normocephalic  EYES: EOMI, PERRLA, conjunctiva and sclera clear  NECK: Supple  NERVOUS SYSTEM:  non focal , AAOx3 , left thigh pain that radiated down leg to left shin  CHEST/LUNG: Clear to auscultation bilaterally; No rales, rhonchi, wheezing, or rubs  HEART: Regular rate and rhythm; No murmurs, rubs, or gallops  ABDOMEN: Soft, Nontender, Nondistended; Bowel sounds present  EXTREMITIES:  2+ Peripheral Pulses, No clubbing, cyanosis, or edema      Assessment and Plan:   · Assessment		  Patient is a 74y old Male who presents with a chief complaint of Currently admitted to medicine with the primary diagnosis of Intractable back pain. mri shows multiple disc herniation but with likely one large one at l2-3 causing symptoms. will go to surgery on tuesday?    # Intractable back pain secondary to multiple level disc disease   - Neurology evaluation patient follows with dr wiliam stinson appreciated  - Neurosurgery evaluation , will go to surgery tomorrow on tuesday 2/11  - Spoke with dr cho from neuro IR, offerd YAS if nsg does not recommend surgery  - Pain management milad appreciated  - In the meantime pain regimen: Gabapentin, Tylenol Oxycodone ER and morphine IV for breakthrough pain. Will avoid NSAID given GERD and ?CAD     - Bowel regimen   - PT/Rehab     # Microcytic Anemia - iron deficiency  - No evidence of melena   - Iron studies show DELLA, started on supplemental iron  - May need outpatient C scope     # HTN   - Continue home meds : lisinopril and HCTZ  - Patient was on aspirin 325 at home unclear reason. Will continue 81 for now (no on record history of CAD)     # DLD  - Continue home meds : simvastatin    # GERD  - Continue home meds :pantoprazole    # AAA - Stable being monitored   - Outpatient follow up with Dr. Sosa     GI PPX: Pantoprazole   DVT PPX: Lovenox     DIET: DASH    Pending:   neuriosurg tomorrow   dispo

## 2020-02-11 ENCOUNTER — RESULT REVIEW (OUTPATIENT)
Age: 75
End: 2020-02-11

## 2020-02-11 LAB
ALBUMIN SERPL ELPH-MCNC: 3.8 G/DL — SIGNIFICANT CHANGE UP (ref 3.5–5.2)
ALP SERPL-CCNC: 53 U/L — SIGNIFICANT CHANGE UP (ref 30–115)
ALT FLD-CCNC: 14 U/L — SIGNIFICANT CHANGE UP (ref 0–41)
ANION GAP SERPL CALC-SCNC: 12 MMOL/L — SIGNIFICANT CHANGE UP (ref 7–14)
AST SERPL-CCNC: 13 U/L — SIGNIFICANT CHANGE UP (ref 0–41)
BASOPHILS # BLD AUTO: 0.05 K/UL — SIGNIFICANT CHANGE UP (ref 0–0.2)
BASOPHILS NFR BLD AUTO: 0.6 % — SIGNIFICANT CHANGE UP (ref 0–1)
BILIRUB SERPL-MCNC: 0.3 MG/DL — SIGNIFICANT CHANGE UP (ref 0.2–1.2)
BUN SERPL-MCNC: 24 MG/DL — HIGH (ref 10–20)
CALCIUM SERPL-MCNC: 8.8 MG/DL — SIGNIFICANT CHANGE UP (ref 8.5–10.1)
CHLORIDE SERPL-SCNC: 103 MMOL/L — SIGNIFICANT CHANGE UP (ref 98–110)
CO2 SERPL-SCNC: 24 MMOL/L — SIGNIFICANT CHANGE UP (ref 17–32)
CREAT SERPL-MCNC: 1 MG/DL — SIGNIFICANT CHANGE UP (ref 0.7–1.5)
EOSINOPHIL # BLD AUTO: 0.27 K/UL — SIGNIFICANT CHANGE UP (ref 0–0.7)
EOSINOPHIL NFR BLD AUTO: 3.1 % — SIGNIFICANT CHANGE UP (ref 0–8)
GLUCOSE BLDC GLUCOMTR-MCNC: 87 MG/DL — SIGNIFICANT CHANGE UP (ref 70–99)
GLUCOSE SERPL-MCNC: 92 MG/DL — SIGNIFICANT CHANGE UP (ref 70–99)
HCT VFR BLD CALC: 36.2 % — LOW (ref 42–52)
HGB BLD-MCNC: 11.2 G/DL — LOW (ref 14–18)
IMM GRANULOCYTES NFR BLD AUTO: 0.9 % — HIGH (ref 0.1–0.3)
LYMPHOCYTES # BLD AUTO: 2.35 K/UL — SIGNIFICANT CHANGE UP (ref 1.2–3.4)
LYMPHOCYTES # BLD AUTO: 27 % — SIGNIFICANT CHANGE UP (ref 20.5–51.1)
MAGNESIUM SERPL-MCNC: 2.1 MG/DL — SIGNIFICANT CHANGE UP (ref 1.8–2.4)
MCHC RBC-ENTMCNC: 24.5 PG — LOW (ref 27–31)
MCHC RBC-ENTMCNC: 30.9 G/DL — LOW (ref 32–37)
MCV RBC AUTO: 79.2 FL — LOW (ref 80–94)
MONOCYTES # BLD AUTO: 0.8 K/UL — HIGH (ref 0.1–0.6)
MONOCYTES NFR BLD AUTO: 9.2 % — SIGNIFICANT CHANGE UP (ref 1.7–9.3)
NEUTROPHILS # BLD AUTO: 5.14 K/UL — SIGNIFICANT CHANGE UP (ref 1.4–6.5)
NEUTROPHILS NFR BLD AUTO: 59.2 % — SIGNIFICANT CHANGE UP (ref 42.2–75.2)
NRBC # BLD: 0 /100 WBCS — SIGNIFICANT CHANGE UP (ref 0–0)
PLATELET # BLD AUTO: 222 K/UL — SIGNIFICANT CHANGE UP (ref 130–400)
POTASSIUM SERPL-MCNC: 5 MMOL/L — SIGNIFICANT CHANGE UP (ref 3.5–5)
POTASSIUM SERPL-SCNC: 5 MMOL/L — SIGNIFICANT CHANGE UP (ref 3.5–5)
PROT SERPL-MCNC: 6.7 G/DL — SIGNIFICANT CHANGE UP (ref 6–8)
RBC # BLD: 4.57 M/UL — LOW (ref 4.7–6.1)
RBC # FLD: 18.6 % — HIGH (ref 11.5–14.5)
SODIUM SERPL-SCNC: 139 MMOL/L — SIGNIFICANT CHANGE UP (ref 135–146)
WBC # BLD: 8.69 K/UL — SIGNIFICANT CHANGE UP (ref 4.8–10.8)
WBC # FLD AUTO: 8.69 K/UL — SIGNIFICANT CHANGE UP (ref 4.8–10.8)

## 2020-02-11 PROCEDURE — 88304 TISSUE EXAM BY PATHOLOGIST: CPT | Mod: 26

## 2020-02-11 PROCEDURE — 88311 DECALCIFY TISSUE: CPT | Mod: 26

## 2020-02-11 PROCEDURE — 99232 SBSQ HOSP IP/OBS MODERATE 35: CPT

## 2020-02-11 RX ORDER — HYDROCHLOROTHIAZIDE 25 MG
12.5 TABLET ORAL DAILY
Refills: 0 | Status: DISCONTINUED | OUTPATIENT
Start: 2020-02-11 | End: 2020-02-14

## 2020-02-11 RX ORDER — LISINOPRIL 2.5 MG/1
40 TABLET ORAL DAILY
Refills: 0 | Status: DISCONTINUED | OUTPATIENT
Start: 2020-02-11 | End: 2020-02-14

## 2020-02-11 RX ORDER — POLYETHYLENE GLYCOL 3350 17 G/17G
17 POWDER, FOR SOLUTION ORAL DAILY
Refills: 0 | Status: DISCONTINUED | OUTPATIENT
Start: 2020-02-11 | End: 2020-02-14

## 2020-02-11 RX ORDER — MORPHINE SULFATE 50 MG/1
4 CAPSULE, EXTENDED RELEASE ORAL
Refills: 0 | Status: DISCONTINUED | OUTPATIENT
Start: 2020-02-11 | End: 2020-02-11

## 2020-02-11 RX ORDER — NICOTINE POLACRILEX 2 MG
1 GUM BUCCAL DAILY
Refills: 0 | Status: DISCONTINUED | OUTPATIENT
Start: 2020-02-11 | End: 2020-02-14

## 2020-02-11 RX ORDER — OXYCODONE AND ACETAMINOPHEN 5; 325 MG/1; MG/1
1 TABLET ORAL ONCE
Refills: 0 | Status: DISCONTINUED | OUTPATIENT
Start: 2020-02-11 | End: 2020-02-11

## 2020-02-11 RX ORDER — PANTOPRAZOLE SODIUM 20 MG/1
40 TABLET, DELAYED RELEASE ORAL
Refills: 0 | Status: DISCONTINUED | OUTPATIENT
Start: 2020-02-11 | End: 2020-02-14

## 2020-02-11 RX ORDER — FERROUS SULFATE 325(65) MG
325 TABLET ORAL DAILY
Refills: 0 | Status: DISCONTINUED | OUTPATIENT
Start: 2020-02-11 | End: 2020-02-14

## 2020-02-11 RX ORDER — SENNA PLUS 8.6 MG/1
2 TABLET ORAL AT BEDTIME
Refills: 0 | Status: DISCONTINUED | OUTPATIENT
Start: 2020-02-11 | End: 2020-02-14

## 2020-02-11 RX ORDER — ATORVASTATIN CALCIUM 80 MG/1
40 TABLET, FILM COATED ORAL AT BEDTIME
Refills: 0 | Status: DISCONTINUED | OUTPATIENT
Start: 2020-02-11 | End: 2020-02-14

## 2020-02-11 RX ORDER — LANOLIN ALCOHOL/MO/W.PET/CERES
5 CREAM (GRAM) TOPICAL AT BEDTIME
Refills: 0 | Status: DISCONTINUED | OUTPATIENT
Start: 2020-02-11 | End: 2020-02-14

## 2020-02-11 RX ORDER — METHOCARBAMOL 500 MG/1
750 TABLET, FILM COATED ORAL EVERY 6 HOURS
Refills: 0 | Status: DISCONTINUED | OUTPATIENT
Start: 2020-02-11 | End: 2020-02-13

## 2020-02-11 RX ORDER — GABAPENTIN 400 MG/1
300 CAPSULE ORAL EVERY 8 HOURS
Refills: 0 | Status: DISCONTINUED | OUTPATIENT
Start: 2020-02-11 | End: 2020-02-13

## 2020-02-11 RX ORDER — HYDROMORPHONE HYDROCHLORIDE 2 MG/ML
1 INJECTION INTRAMUSCULAR; INTRAVENOUS; SUBCUTANEOUS
Refills: 0 | Status: DISCONTINUED | OUTPATIENT
Start: 2020-02-11 | End: 2020-02-11

## 2020-02-11 RX ORDER — SODIUM CHLORIDE 9 MG/ML
1000 INJECTION, SOLUTION INTRAVENOUS
Refills: 0 | Status: DISCONTINUED | OUTPATIENT
Start: 2020-02-11 | End: 2020-02-12

## 2020-02-11 RX ORDER — OXYCODONE HYDROCHLORIDE 5 MG/1
10 TABLET ORAL EVERY 4 HOURS
Refills: 0 | Status: DISCONTINUED | OUTPATIENT
Start: 2020-02-11 | End: 2020-02-13

## 2020-02-11 RX ORDER — ONDANSETRON 8 MG/1
4 TABLET, FILM COATED ORAL ONCE
Refills: 0 | Status: DISCONTINUED | OUTPATIENT
Start: 2020-02-11 | End: 2020-02-11

## 2020-02-11 RX ORDER — ACETAMINOPHEN 500 MG
650 TABLET ORAL EVERY 4 HOURS
Refills: 0 | Status: DISCONTINUED | OUTPATIENT
Start: 2020-02-11 | End: 2020-02-13

## 2020-02-11 RX ORDER — SODIUM CHLORIDE 9 MG/ML
1000 INJECTION, SOLUTION INTRAVENOUS
Refills: 0 | Status: DISCONTINUED | OUTPATIENT
Start: 2020-02-11 | End: 2020-02-11

## 2020-02-11 RX ORDER — ONDANSETRON 8 MG/1
4 TABLET, FILM COATED ORAL EVERY 6 HOURS
Refills: 0 | Status: DISCONTINUED | OUTPATIENT
Start: 2020-02-11 | End: 2020-02-12

## 2020-02-11 RX ORDER — HYDROMORPHONE HYDROCHLORIDE 2 MG/ML
0.5 INJECTION INTRAMUSCULAR; INTRAVENOUS; SUBCUTANEOUS
Refills: 0 | Status: DISCONTINUED | OUTPATIENT
Start: 2020-02-11 | End: 2020-02-11

## 2020-02-11 RX ORDER — CHLORHEXIDINE GLUCONATE 213 G/1000ML
1 SOLUTION TOPICAL
Refills: 0 | Status: DISCONTINUED | OUTPATIENT
Start: 2020-02-11 | End: 2020-02-14

## 2020-02-11 RX ORDER — CEFAZOLIN SODIUM 1 G
1000 VIAL (EA) INJECTION EVERY 8 HOURS
Refills: 0 | Status: COMPLETED | OUTPATIENT
Start: 2020-02-11 | End: 2020-02-12

## 2020-02-11 RX ADMIN — Medication 650 MILLIGRAM(S): at 21:38

## 2020-02-11 RX ADMIN — METHOCARBAMOL 750 MILLIGRAM(S): 500 TABLET, FILM COATED ORAL at 05:14

## 2020-02-11 RX ADMIN — ATORVASTATIN CALCIUM 40 MILLIGRAM(S): 80 TABLET, FILM COATED ORAL at 21:38

## 2020-02-11 RX ADMIN — GABAPENTIN 300 MILLIGRAM(S): 400 CAPSULE ORAL at 13:57

## 2020-02-11 RX ADMIN — Medication 100 MILLIGRAM(S): at 22:45

## 2020-02-11 RX ADMIN — METHOCARBAMOL 750 MILLIGRAM(S): 500 TABLET, FILM COATED ORAL at 23:45

## 2020-02-11 RX ADMIN — Medication 325 MILLIGRAM(S): at 13:56

## 2020-02-11 RX ADMIN — Medication 100 MILLIGRAM(S): at 14:35

## 2020-02-11 RX ADMIN — SODIUM CHLORIDE 75 MILLILITER(S): 9 INJECTION, SOLUTION INTRAVENOUS at 15:16

## 2020-02-11 RX ADMIN — SODIUM CHLORIDE 75 MILLILITER(S): 9 INJECTION INTRAMUSCULAR; INTRAVENOUS; SUBCUTANEOUS at 01:01

## 2020-02-11 RX ADMIN — Medication 1 PATCH: at 13:49

## 2020-02-11 RX ADMIN — METHOCARBAMOL 750 MILLIGRAM(S): 500 TABLET, FILM COATED ORAL at 17:48

## 2020-02-11 RX ADMIN — POLYETHYLENE GLYCOL 3350 17 GRAM(S): 17 POWDER, FOR SOLUTION ORAL at 13:58

## 2020-02-11 RX ADMIN — Medication 650 MILLIGRAM(S): at 17:49

## 2020-02-11 RX ADMIN — Medication 650 MILLIGRAM(S): at 12:02

## 2020-02-11 RX ADMIN — Medication 650 MILLIGRAM(S): at 12:17

## 2020-02-11 RX ADMIN — Medication 650 MILLIGRAM(S): at 17:48

## 2020-02-11 RX ADMIN — Medication 12.5 MILLIGRAM(S): at 05:13

## 2020-02-11 RX ADMIN — SODIUM CHLORIDE 100 MILLILITER(S): 9 INJECTION, SOLUTION INTRAVENOUS at 11:59

## 2020-02-11 RX ADMIN — OXYCODONE HYDROCHLORIDE 10 MILLIGRAM(S): 5 TABLET ORAL at 05:13

## 2020-02-11 RX ADMIN — Medication 1 PATCH: at 13:57

## 2020-02-11 RX ADMIN — Medication 650 MILLIGRAM(S): at 14:06

## 2020-02-11 RX ADMIN — LISINOPRIL 40 MILLIGRAM(S): 2.5 TABLET ORAL at 05:13

## 2020-02-11 RX ADMIN — METHOCARBAMOL 750 MILLIGRAM(S): 500 TABLET, FILM COATED ORAL at 13:57

## 2020-02-11 RX ADMIN — Medication 1 PATCH: at 07:00

## 2020-02-11 RX ADMIN — Medication 650 MILLIGRAM(S): at 13:55

## 2020-02-11 RX ADMIN — GABAPENTIN 300 MILLIGRAM(S): 400 CAPSULE ORAL at 05:14

## 2020-02-11 RX ADMIN — GABAPENTIN 300 MILLIGRAM(S): 400 CAPSULE ORAL at 21:38

## 2020-02-11 RX ADMIN — METHOCARBAMOL 750 MILLIGRAM(S): 500 TABLET, FILM COATED ORAL at 00:23

## 2020-02-11 RX ADMIN — Medication 650 MILLIGRAM(S): at 05:13

## 2020-02-11 RX ADMIN — Medication 1 PATCH: at 17:53

## 2020-02-11 RX ADMIN — Medication 5 MILLIGRAM(S): at 21:38

## 2020-02-11 RX ADMIN — Medication 650 MILLIGRAM(S): at 06:00

## 2020-02-11 RX ADMIN — SENNA PLUS 2 TABLET(S): 8.6 TABLET ORAL at 21:38

## 2020-02-11 NOTE — PROGRESS NOTE ADULT - SUBJECTIVE AND OBJECTIVE BOX
MEDARDO GUTIERREZ  74y  Male  ***My note supersedes ALL resident notes that I sign.  My corrections for their notes are in my note.***    I can be reached directly on [id number]. My office number is [phone]. My personal cell number is [phone].    INTERVAL EVENTS: Here for f/u of back pain. Pt says he is feeling better after sx.  He has minor left leg pain, but better than admission.  Pt tolerated procedure well. No CP or SOB currently.    T(F): 97.3 (02-11-20 @ 13:58), Max: 98.6 (02-11-20 @ 10:40)  HR: 82 (02-11-20 @ 13:58) (66 - 90)  BP: 118/69 (02-11-20 @ 13:58) (108/63 - 135/62)  RR: 18 (02-11-20 @ 13:58) (17 - 20)  SpO2: 99% (02-11-20 @ 12:40) (97% - 99%)    Gen: NAD; fairly comfortable and mobile in bed  HEENT: WNL  Neck: no JVD, no Nodes  lungs: clr  hrt: s1 s2 rrr  abd soft nt/nd  ext no edema, no c/c  neuro: aa ox3, CN intact, pt has excellent AROM of legs at hips, knees and ankles, no foot drop    LABS:                        11.2    (    79.2   8.69  )-----------( ---------      222      ( 11 Feb 2020 06:50 )             36.2    (    18.6     139   (   103   (   92      02-11-20 @ 06:50  ----------------------               5.0   (   24   (   24                             -----                        1.0  Ca  8.8   Mg  2.1    P   --     136   (   102   (   109      02-10-20 @ 20:49  ----------------------               4.8   (   23   (   27                             -----                        1.2  Ca  8.7   Mg  --    P   --     LFT  6.7  (  0.3  (  13       02-11-20 @ 06:50  -------------------------  3.8  (  53  (  14    PT/INR - ( 10 Feb 2020 06:38 )   PT: 12.20 sec;   INR: 1.06 ratio    PTT - ( 10 Feb 2020 06:38 )  PTT:31.1 sec    RADIOLOGY & ADDITIONAL TESTS:      MEDICATIONS:  ceFAZolin   IVPB 1000 milliGRAM(s) IV Intermittent every 8 hours    acetaminophen   Tablet .. 650 milliGRAM(s) Oral every 4 hours  aluminum hydroxide/magnesium hydroxide/simethicone Suspension 30 milliLiter(s) Oral every 4 hours PRN  bisacodyl 5 milliGRAM(s) Oral every 12 hours PRN  dextrose 5% + sodium chloride 0.45%. 1000 milliLiter(s) IV Continuous <Continuous>  ferrous    sulfate 325 milliGRAM(s) Oral daily  gabapentin 300 milliGRAM(s) Oral every 8 hours  hydrochlorothiazide 12.5 milliGRAM(s) Oral daily  lisinopril 40 milliGRAM(s) Oral daily  melatonin 5 milliGRAM(s) Oral at bedtime  methocarbamol 750 milliGRAM(s) Oral every 6 hours  morphine  - Injectable 4 milliGRAM(s) IV Push every 3 hours PRN  nicotine -  14 mG/24Hr(s) Patch 1 patch Transdermal daily  ondansetron Injectable 4 milliGRAM(s) IV Push every 6 hours PRN  oxyCODONE    IR 10 milliGRAM(s) Oral every 4 hours PRN  pantoprazole    Tablet 40 milliGRAM(s) Oral before breakfast  polyethylene glycol 3350 17 Gram(s) Oral daily  senna 2 Tablet(s) Oral at bedtime

## 2020-02-11 NOTE — CHART NOTE - NSCHARTNOTEFT_GEN_A_CORE
PACU ANESTHESIA ADMISSION NOTE      Procedure:   Post op diagnosis:      ____  Intubated  TV:______       Rate: ______      FiO2: ______    _x___  Patent Airway    __x__  Full return of protective reflexes    ___x_  Full recovery from anesthesia / back to baseline     Vitals:   T: 98.6          R: 10                 BP:  135/62                Sat: 96                  P: 97      Mental Status:  _x___ Awake   ___x__ Alert   _____ Drowsy   _____ Sedated    Nausea/Vomiting:  _x___ NO  ______Yes,   See Post - Op Orders          Pain Scale (0-10):  ___0__    Treatment: ____ None    _x___ See Post - Op/PCA Orders    Post - Operative Fluids:   ____ Oral   _x___ See Post - Op Orders    Plan: Discharge:   ____Home       __x___Floor     _____Critical Care    _____  Other:_alert and awake moving all four extremities ________________    Comments:

## 2020-02-11 NOTE — BRIEF OPERATIVE NOTE - NSICDXBRIEFPOSTOP_GEN_ALL_CORE_FT
POST-OP DIAGNOSIS:  Lumbar disc disease with radiculopathy 11-Feb-2020 12:16:47 Left L2-3 Sunitha Blackman

## 2020-02-11 NOTE — CHART NOTE - NSCHARTNOTEFT_GEN_A_CORE
As per the consult note, imaging demonstrates left L2-3 disc herniation.  Pt with left L3 radiculopathy.     Case was booked as L3-4 microdiscectomy; however, as per my original note and per imaging findings, the surgery that is offered and planned to be performed is LEFT LUMBAR 2-3 MICRODISCECTOMY.       Patient and preop/intraop nursing aware.

## 2020-02-11 NOTE — CHART NOTE - NSCHARTNOTEFT_GEN_A_CORE
Pt due for comprehensive assessment today (2/11) for LOS, however pt is out of room upon multiple RD attempts at assessment d/t surgical procedure. Will attempt again tomorrow.

## 2020-02-11 NOTE — PROGRESS NOTE ADULT - ASSESSMENT
74y old Male admitted to medicine with the primary diagnosis of Intractable back pain    # Intractable back pain secondary to multiple level vert disease; L2-3 disc james; spinal stenosis; [place] [facility] [place]  s/p L2-3 microdiscectomy and possible decompressive laminectomy   Neuro - d/w Dr Wagner - no further w/u; tx pain -  f/u Nsx  f/u Pain mngmt   need rehab eval after sx    # pain reg per pain mngmt  morphine 4mg IV is actually less than oxy ir 10mg po - d/c morphine IV (pt seems comfortable)  Tylenol 650mg po q4 - around the clock  gabapentin 300mg po q8   oxyCODONE IR 10 milliGRAM(s) Oral every 4 hours PRN sev pain  robaxin 750mg po q6  can d/c warm compresses  DC morphine; DC traMADol   avoid NSAIDS given GERD and anemia  cont bowel regimen     # Microcytic Anemia 2/2 DELLA - unsure of cause (? 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 Sosa     # DVT ppx: lovenox to restart alondra if OK w/ NSx     Disposition: f/u Nsx for back sx; tx post-op pain; rehab f/u; outpt w/u for DELLA    will see how pt does post-op - perhaps he'll go to [facility] (we'll see).

## 2020-02-11 NOTE — PROGRESS NOTE ADULT - SUBJECTIVE AND OBJECTIVE BOX
NEUROSURGERY POST OP NOTE:    S/P Lumbar 2/3 decompressive laminectomy discectomy  Pt seen and examined at bedside reports resolution of pre-op radicular pain.           T(C): 36.3 (02-11-20 @ 13:58), Max: 37 (02-11-20 @ 10:40)  HR: 82 (02-11-20 @ 13:58) (66 - 90)  BP: 118/69 (02-11-20 @ 13:58) (108/63 - 135/62)  RR: 18 (02-11-20 @ 13:58) (17 - 20)  SpO2: 99% (02-11-20 @ 12:40) (97% - 99%)        acetaminophen   Tablet .. 650 milliGRAM(s) Oral every 4 hours  aluminum hydroxide/magnesium hydroxide/simethicone Suspension 30 milliLiter(s) Oral every 4 hours PRN  bisacodyl 5 milliGRAM(s) Oral every 12 hours PRN  ceFAZolin   IVPB 1000 milliGRAM(s) IV Intermittent every 8 hours  dextrose 5% + sodium chloride 0.45%. 1000 milliLiter(s) IV Continuous <Continuous>  ferrous    sulfate 325 milliGRAM(s) Oral daily  gabapentin 300 milliGRAM(s) Oral every 8 hours  hydrochlorothiazide 12.5 milliGRAM(s) Oral daily  lisinopril 40 milliGRAM(s) Oral daily  melatonin 5 milliGRAM(s) Oral at bedtime  methocarbamol 750 milliGRAM(s) Oral every 6 hours  morphine  - Injectable 4 milliGRAM(s) IV Push every 3 hours PRN  nicotine -  14 mG/24Hr(s) Patch 1 patch Transdermal daily  ondansetron Injectable 4 milliGRAM(s) IV Push every 6 hours PRN  oxyCODONE    IR 10 milliGRAM(s) Oral every 4 hours PRN  pantoprazole    Tablet 40 milliGRAM(s) Oral before breakfast  polyethylene glycol 3350 17 Gram(s) Oral daily  senna 2 Tablet(s) Oral at bedtime      Exam:  A&Ox3 NAD speech clear  incision dressing C/D/I  Motor strength 5/5x4  Sensory intact  No Path DTRs        Assessment:  S/P L2/3 MLD      Plan: Stable  Pain Control  Serial Neuro checks  PT/Rehab  D/W attending

## 2020-02-11 NOTE — PRE-ANESTHESIA EVALUATION ADULT - NSANTHOSAYNRD_GEN_A_CORE
No. PREET screening performed.  STOP BANG Legend: 0-2 = LOW Risk; 3-4 = INTERMEDIATE Risk; 5-8 = HIGH Risk
No. PREET screening performed.  STOP BANG Legend: 0-2 = LOW Risk; 3-4 = INTERMEDIATE Risk; 5-8 = HIGH Risk

## 2020-02-11 NOTE — BRIEF OPERATIVE NOTE - NSICDXBRIEFPREOP_GEN_ALL_CORE_FT
PRE-OP DIAGNOSIS:  Lumbar disc herniation with radiculopathy 11-Feb-2020 12:15:55 left L2-3 Sunitha Blackman

## 2020-02-11 NOTE — PROGRESS NOTE ADULT - SUBJECTIVE AND OBJECTIVE BOX
24H events:    Patient is a 74y old Male who presents with a chief complaint of Back Pain (10 Feb 2020 18:16)    Primary diagnosis of Intractable back pain     Today is hospital day 7d. This morning patient was seen and examined at bedside, resting comfortably in bed.      PAST MEDICAL & SURGICAL HISTORY  History of degenerative disc disease  Elevated lipids  Abdominal aortic aneurysm  HTN (hypertension)    SOCIAL HISTORY:  Social History:  Active smoker (6-7 cigarettes daily)   Social drinker   Denies illicit drugs (04 Feb 2020 14:54)      ALLERGIES:  No Known Allergies    MEDICATIONS:  STANDING MEDICATIONS    PRN MEDICATIONS    VITALS:   T(F): 98.3  HR: 66  BP: 120/75  RR: 18  SpO2: 97%    LABS:                        11.2   8.69  )-----------( 222      ( 11 Feb 2020 06:50 )             36.2     02-11    139  |  103  |  24<H>  ----------------------------<  92  5.0   |  24  |  1.0    Ca    8.8      11 Feb 2020 06:50  Mg     2.1     02-11    TPro  6.7  /  Alb  3.8  /  TBili  0.3  /  DBili  x   /  AST  13  /  ALT  14  /  AlkPhos  53  02-11    PT/INR - ( 10 Feb 2020 06:38 )   PT: 12.20 sec;   INR: 1.06 ratio         PTT - ( 10 Feb 2020 06:38 )  PTT:31.1 sec      RADIOLOGY:  < from: US Abdomen Limited (02.08.20 @ 11:05) >  IMPRESSION:    Unremarkable right upper quadrant ultrasound.  Evaluation of the pancreas obscured by overlying bowel gas.    < end of copied text >      Outpatient MRI impression:   1-Multilevel degenerative disc disease without evidence of fracture or bone marrow edema   2-L2-L3 demonstrates an extruded disc herniation with focally involving the left paracentral and articular recess regions which demonstrates caudal extension posterior to the L3 vertebral boy. The dis material measures 2.3X0.98 cm with contact and displacement of the intradural left L3 nerve root. There is morederate central spinal canal stenosis with moderate narrowing of the left neuroforamen and mild to moderate narrowing of the righ neuroforamen   3-L3-4 space demonstrates contact of the exiting left L3  nerve root. There is mild to moderate central spinal canal stenosis with mild to moderate narrowing of the left neuroforamen and mild narrowing of the the right neuroforamen   4-L4-5 space demonstrates contact of the intradural L5 nerve roots and the exiting L4 nerve roots. There is facet arthropathy and hypertrophy of the ligamentum flavum more prominent on the left. There is moderate to severe central spinal canal stenosis with moderate to severe narrowing of the right neuroforamen and severe narrowing of the left neuroforamen   5-L5-S1 disc space demonstrates contact and displacement and the intradural left S1 nerve root. There is narrowing of the left articular recess with moderate narrowing of the left neuroforamen and mild narrowing of the right neuroforamen       PHYSICAL EXAM:  GENERAL: NAD  HEAD:  Atraumatic, Normocephalic  EYES: EOMI, PERRLA, conjunctiva and sclera clear  NECK: Supple  NERVOUS SYSTEM:  non focal , AAOx3 , left thigh pain that radiated down leg to left shin  CHEST/LUNG: Clear to auscultation bilaterally; No rales, rhonchi, wheezing, or rubs  HEART: Regular rate and rhythm; No murmurs, rubs, or gallops  ABDOMEN: Soft, Nontender, Nondistended; Bowel sounds present  EXTREMITIES:  2+ Peripheral Pulses, No clubbing, cyanosis, or edema      Assessment and Plan:   · Assessment		  Patient is a 74y old Male who presents with a chief complaint of Currently admitted to medicine with the primary diagnosis of Intractable back pain. mri shows multiple disc herniation but with likely one large one at l2-3 causing symptoms. will go to surgery on tuesday?    # Intractable back pain secondary to multiple level disc disease   - Neurology evaluation patient follows with dr wiliam stinson appreciated  - patient had surgery today   - Spoke with dr cho from neuro IR, sudhir SORENSON if ns does not recommend surgery  - Pain management milad appreciated  - In the meantime pain regimen: Gabapentin, Tylenol Oxycodone ER and morphine IV for breakthrough pain. Will avoid NSAID given GERD and ?CAD     - Bowel regimen   - PT/Rehab     # Microcytic Anemia - iron deficiency  - No evidence of melena   - Iron studies show DELLA, started on supplemental iron  - May need outpatient C scope     # HTN   - Continue home meds : lisinopril and HCTZ  - Patient was on aspirin 325 at home unclear reason. Will continue 81 for now (no on record history of CAD)     # DLD  - Continue home meds : simvastatin    # GERD  - Continue home meds :pantoprazole    # AAA - Stable being monitored   - Outpatient follow up with Dr. Sosa     GI PPX: Pantoprazole   DVT PPX: Lovenox     DIET: DASH    Pending:   neurosx today   dispo

## 2020-02-12 LAB
ANION GAP SERPL CALC-SCNC: 15 MMOL/L — HIGH (ref 7–14)
BASOPHILS # BLD AUTO: 0.02 K/UL — SIGNIFICANT CHANGE UP (ref 0–0.2)
BASOPHILS NFR BLD AUTO: 0.2 % — SIGNIFICANT CHANGE UP (ref 0–1)
BUN SERPL-MCNC: 20 MG/DL — SIGNIFICANT CHANGE UP (ref 10–20)
CALCIUM SERPL-MCNC: 8.8 MG/DL — SIGNIFICANT CHANGE UP (ref 8.5–10.1)
CHLORIDE SERPL-SCNC: 100 MMOL/L — SIGNIFICANT CHANGE UP (ref 98–110)
CO2 SERPL-SCNC: 25 MMOL/L — SIGNIFICANT CHANGE UP (ref 17–32)
CREAT SERPL-MCNC: 1 MG/DL — SIGNIFICANT CHANGE UP (ref 0.7–1.5)
EOSINOPHIL # BLD AUTO: 0.11 K/UL — SIGNIFICANT CHANGE UP (ref 0–0.7)
EOSINOPHIL NFR BLD AUTO: 1 % — SIGNIFICANT CHANGE UP (ref 0–8)
GLUCOSE SERPL-MCNC: 111 MG/DL — HIGH (ref 70–99)
HCT VFR BLD CALC: 34.8 % — LOW (ref 42–52)
HGB BLD-MCNC: 10.8 G/DL — LOW (ref 14–18)
IMM GRANULOCYTES NFR BLD AUTO: 0.6 % — HIGH (ref 0.1–0.3)
LYMPHOCYTES # BLD AUTO: 17.6 % — LOW (ref 20.5–51.1)
LYMPHOCYTES # BLD AUTO: 2 K/UL — SIGNIFICANT CHANGE UP (ref 1.2–3.4)
MCHC RBC-ENTMCNC: 24.8 PG — LOW (ref 27–31)
MCHC RBC-ENTMCNC: 31 G/DL — LOW (ref 32–37)
MCV RBC AUTO: 79.8 FL — LOW (ref 80–94)
MONOCYTES # BLD AUTO: 1.14 K/UL — HIGH (ref 0.1–0.6)
MONOCYTES NFR BLD AUTO: 10 % — HIGH (ref 1.7–9.3)
NEUTROPHILS # BLD AUTO: 8.01 K/UL — HIGH (ref 1.4–6.5)
NEUTROPHILS NFR BLD AUTO: 70.6 % — SIGNIFICANT CHANGE UP (ref 42.2–75.2)
NRBC # BLD: 0 /100 WBCS — SIGNIFICANT CHANGE UP (ref 0–0)
PLATELET # BLD AUTO: 239 K/UL — SIGNIFICANT CHANGE UP (ref 130–400)
POTASSIUM SERPL-MCNC: 4.4 MMOL/L — SIGNIFICANT CHANGE UP (ref 3.5–5)
POTASSIUM SERPL-SCNC: 4.4 MMOL/L — SIGNIFICANT CHANGE UP (ref 3.5–5)
RBC # BLD: 4.36 M/UL — LOW (ref 4.7–6.1)
RBC # FLD: 18.8 % — HIGH (ref 11.5–14.5)
SODIUM SERPL-SCNC: 140 MMOL/L — SIGNIFICANT CHANGE UP (ref 135–146)
SURGICAL PATHOLOGY STUDY: SIGNIFICANT CHANGE UP
WBC # BLD: 11.35 K/UL — HIGH (ref 4.8–10.8)
WBC # FLD AUTO: 11.35 K/UL — HIGH (ref 4.8–10.8)

## 2020-02-12 PROCEDURE — 99232 SBSQ HOSP IP/OBS MODERATE 35: CPT

## 2020-02-12 RX ORDER — CALCIUM CARBONATE 500(1250)
1 TABLET ORAL EVERY 6 HOURS
Refills: 0 | Status: DISCONTINUED | OUTPATIENT
Start: 2020-02-12 | End: 2020-02-14

## 2020-02-12 RX ORDER — ENOXAPARIN SODIUM 100 MG/ML
40 INJECTION SUBCUTANEOUS AT BEDTIME
Refills: 0 | Status: DISCONTINUED | OUTPATIENT
Start: 2020-02-12 | End: 2020-02-14

## 2020-02-12 RX ORDER — TAMSULOSIN HYDROCHLORIDE 0.4 MG/1
0.4 CAPSULE ORAL AT BEDTIME
Refills: 0 | Status: DISCONTINUED | OUTPATIENT
Start: 2020-02-12 | End: 2020-02-14

## 2020-02-12 RX ADMIN — Medication 650 MILLIGRAM(S): at 18:21

## 2020-02-12 RX ADMIN — OXYCODONE HYDROCHLORIDE 10 MILLIGRAM(S): 5 TABLET ORAL at 02:39

## 2020-02-12 RX ADMIN — ENOXAPARIN SODIUM 40 MILLIGRAM(S): 100 INJECTION SUBCUTANEOUS at 21:07

## 2020-02-12 RX ADMIN — Medication 325 MILLIGRAM(S): at 12:04

## 2020-02-12 RX ADMIN — GABAPENTIN 300 MILLIGRAM(S): 400 CAPSULE ORAL at 05:11

## 2020-02-12 RX ADMIN — OXYCODONE HYDROCHLORIDE 10 MILLIGRAM(S): 5 TABLET ORAL at 00:38

## 2020-02-12 RX ADMIN — Medication 650 MILLIGRAM(S): at 17:51

## 2020-02-12 RX ADMIN — ATORVASTATIN CALCIUM 40 MILLIGRAM(S): 80 TABLET, FILM COATED ORAL at 21:07

## 2020-02-12 RX ADMIN — Medication 100 MILLIGRAM(S): at 05:10

## 2020-02-12 RX ADMIN — Medication 650 MILLIGRAM(S): at 12:34

## 2020-02-12 RX ADMIN — METHOCARBAMOL 750 MILLIGRAM(S): 500 TABLET, FILM COATED ORAL at 05:11

## 2020-02-12 RX ADMIN — Medication 1 PATCH: at 12:05

## 2020-02-12 RX ADMIN — TAMSULOSIN HYDROCHLORIDE 0.4 MILLIGRAM(S): 0.4 CAPSULE ORAL at 12:02

## 2020-02-12 RX ADMIN — Medication 650 MILLIGRAM(S): at 21:06

## 2020-02-12 RX ADMIN — SENNA PLUS 2 TABLET(S): 8.6 TABLET ORAL at 21:07

## 2020-02-12 RX ADMIN — Medication 650 MILLIGRAM(S): at 02:38

## 2020-02-12 RX ADMIN — Medication 30 MILLILITER(S): at 02:51

## 2020-02-12 RX ADMIN — Medication 650 MILLIGRAM(S): at 21:36

## 2020-02-12 RX ADMIN — GABAPENTIN 300 MILLIGRAM(S): 400 CAPSULE ORAL at 13:33

## 2020-02-12 RX ADMIN — Medication 5 MILLIGRAM(S): at 21:07

## 2020-02-12 RX ADMIN — LISINOPRIL 40 MILLIGRAM(S): 2.5 TABLET ORAL at 05:11

## 2020-02-12 RX ADMIN — Medication 1 PATCH: at 12:01

## 2020-02-12 RX ADMIN — Medication 1 PATCH: at 07:51

## 2020-02-12 RX ADMIN — Medication 650 MILLIGRAM(S): at 07:23

## 2020-02-12 RX ADMIN — GABAPENTIN 300 MILLIGRAM(S): 400 CAPSULE ORAL at 21:07

## 2020-02-12 RX ADMIN — METHOCARBAMOL 750 MILLIGRAM(S): 500 TABLET, FILM COATED ORAL at 12:04

## 2020-02-12 RX ADMIN — Medication 650 MILLIGRAM(S): at 05:00

## 2020-02-12 RX ADMIN — Medication 650 MILLIGRAM(S): at 00:39

## 2020-02-12 RX ADMIN — Medication 12.5 MILLIGRAM(S): at 05:11

## 2020-02-12 RX ADMIN — METHOCARBAMOL 750 MILLIGRAM(S): 500 TABLET, FILM COATED ORAL at 17:51

## 2020-02-12 RX ADMIN — PANTOPRAZOLE SODIUM 40 MILLIGRAM(S): 20 TABLET, DELAYED RELEASE ORAL at 05:12

## 2020-02-12 RX ADMIN — Medication 650 MILLIGRAM(S): at 05:11

## 2020-02-12 RX ADMIN — Medication 1 PATCH: at 20:03

## 2020-02-12 RX ADMIN — POLYETHYLENE GLYCOL 3350 17 GRAM(S): 17 POWDER, FOR SOLUTION ORAL at 12:05

## 2020-02-12 RX ADMIN — Medication 650 MILLIGRAM(S): at 12:04

## 2020-02-12 NOTE — DIETITIAN INITIAL EVALUATION ADULT. - FEEDING SKILL
Pt reports fair intake PTA. Typically cooks for self or family/home health aid cook for pt. Follows GERD diet at baseline. NKFA. Takes daily one a day supplement.

## 2020-02-12 NOTE — PROGRESS NOTE ADULT - SUBJECTIVE AND OBJECTIVE BOX
MEDARDO GUTIERREZ  74y  Male  ***My note supersedes ALL resident notes that I sign.  My corrections for their notes are in my note.***    I can be reached directly on [id number]. My office number is [phone]. My personal cell number is [phone].    INTERVAL EVENTS: Here for f/u of back pain. Pt walked well today, but w/ asst. Pt does not feel like he can go home today and prob for at least a little while longer. Pt eating/drinking fine. Pt is comfortable in bed.    T(F): 97.9 (02-12-20 @ 14:17), Max: 97.9 (02-12-20 @ 14:17)  HR: 92 (02-12-20 @ 14:17) (77 - 106)  BP: 96/63 (02-12-20 @ 14:17) (96/63 - 158/91)  RR: 18 (02-12-20 @ 14:17) (18 - 18)  SpO2: 97% (02-12-20 @ 14:28) (97% - 97%)    02-12-20 @ 07:01  -  02-12-20 @ 16:03  --------------------------------------------------------  IN: 0 mL / OUT: 1050 mL / NET: -1050 mL          LABS:    RADIOLOGY & ADDITIONAL TESTS:      MEDICATIONS:    acetaminophen   Tablet .. 650 milliGRAM(s) Oral every 4 hours  aluminum hydroxide/magnesium hydroxide/simethicone Suspension 30 milliLiter(s) Oral every 4 hours PRN  atorvastatin 40 milliGRAM(s) Oral at bedtime  bisacodyl 5 milliGRAM(s) Oral every 12 hours PRN  calcium carbonate    500 mG (Tums) Chewable 1 Tablet(s) Chew every 6 hours PRN  chlorhexidine 4% Liquid 1 Application(s) Topical <User Schedule>  enoxaparin Injectable 40 milliGRAM(s) SubCutaneous at bedtime  ferrous    sulfate 325 milliGRAM(s) Oral daily  gabapentin 300 milliGRAM(s) Oral every 8 hours  hydrochlorothiazide 12.5 milliGRAM(s) Oral daily  lisinopril 40 milliGRAM(s) Oral daily  melatonin 5 milliGRAM(s) Oral at bedtime  methocarbamol 750 milliGRAM(s) Oral every 6 hours  nicotine -  14 mG/24Hr(s) Patch 1 patch Transdermal daily  oxyCODONE    IR 10 milliGRAM(s) Oral every 4 hours PRN  pantoprazole    Tablet 40 milliGRAM(s) Oral before breakfast  polyethylene glycol 3350 17 Gram(s) Oral daily  senna 2 Tablet(s) Oral at bedtime  tamsulosin 0.4 milliGRAM(s) Oral at bedtime MEDARDO GUTIERREZ  74y  Male  ***My note supersedes ALL resident notes that I sign.  My corrections for their notes are in my note.***    I can be reached directly on [employer]. My office number is [phone]. My personal cell number is [phone].    INTERVAL EVENTS: Here for f/u of back pain. Pt walked well today, but w/ asst. Pt does not feel like he can go home today and prob for at least a little while longer. Pt eating/drinking fine. Pt is comfortable in bed.    T(F): 97.9 (02-12-20 @ 14:17), Max: 97.9 (02-12-20 @ 14:17)  HR: 92 (02-12-20 @ 14:17) (77 - 106)  BP: 96/63 (02-12-20 @ 14:17) (96/63 - 158/91)  RR: 18 (02-12-20 @ 14:17) (18 - 18)  SpO2: 97% (02-12-20 @ 14:28) (97% - 97%)    02-12-20 @ 07:01  -  02-12-20 @ 16:03  --------------------------------------------------------  IN: 0 mL / OUT: 1050 mL / NET: -1050 mL    Gen: NAD; fairly comfortable and mobile in bed; walking w/ asst  HEENT: WNL  Neck: no JVD, no Nodes  lungs: clr  hrt: s1 s2 rrr  abd soft nt/nd  ext no edema, no c/c  neuro: aa ox3, CN intact, pt has excellent AROM of legs at hips, knees and ankles, no foot drop    LABS:                        10.8    (    79.8   11.35 )-----------( ---------      239      ( 12 Feb 2020 07:31 )             34.8    (    18.8     WBC Count: 11.35 K/uL (02-12-20 @ 07:31)  WBC Count: 8.69 K/uL (02-11-20 @ 06:50)  WBC Count: 10.22 K/uL (02-10-20 @ 06:38)  WBC Count: 11.08 K/uL (02-09-20 @ 08:01)    Hemoglobin: 10.8 g/dL (02-12 @ 07:31)  Hemoglobin: 11.2 g/dL (02-11 @ 06:50)  Hemoglobin: 11.1 g/dL (02-10 @ 06:38)  Hemoglobin: 11.6 g/dL (02-09 @ 08:01)    140   (   100   (   111      02-12-20 @ 07:31  ----------------------               4.4   (   25   (   20                             -----                        1.0  Ca  8.8   Mg  --    P   --     RADIOLOGY & ADDITIONAL TESTS:      MEDICATIONS:    acetaminophen   Tablet .. 650 milliGRAM(s) Oral every 4 hours  aluminum hydroxide/magnesium hydroxide/simethicone Suspension 30 milliLiter(s) Oral every 4 hours PRN  atorvastatin 40 milliGRAM(s) Oral at bedtime  bisacodyl 5 milliGRAM(s) Oral every 12 hours PRN  calcium carbonate    500 mG (Tums) Chewable 1 Tablet(s) Chew every 6 hours PRN  chlorhexidine 4% Liquid 1 Application(s) Topical <User Schedule>  enoxaparin Injectable 40 milliGRAM(s) SubCutaneous at bedtime  ferrous    sulfate 325 milliGRAM(s) Oral daily  gabapentin 300 milliGRAM(s) Oral every 8 hours  hydrochlorothiazide 12.5 milliGRAM(s) Oral daily  lisinopril 40 milliGRAM(s) Oral daily  melatonin 5 milliGRAM(s) Oral at bedtime  methocarbamol 750 milliGRAM(s) Oral every 6 hours  nicotine -  14 mG/24Hr(s) Patch 1 patch Transdermal daily  oxyCODONE    IR 10 milliGRAM(s) Oral every 4 hours PRN  pantoprazole    Tablet 40 milliGRAM(s) Oral before breakfast  polyethylene glycol 3350 17 Gram(s) Oral daily  senna 2 Tablet(s) Oral at bedtime  tamsulosin 0.4 milliGRAM(s) Oral at bedtime

## 2020-02-12 NOTE — DIETITIAN INITIAL EVALUATION ADULT. - OTHER INFO
Pt p/w back pain and ambulatory dysfunction. Intractable back pain 2/2 multiple level disc disease s/p lumbar microdiscectomy 2/11. Microcytic anemia, iron deficiency, no evidence of melena

## 2020-02-12 NOTE — DIETITIAN INITIAL EVALUATION ADULT. - ENERGY NEEDS
estimated calorie needs = ~1620 kcal/day (MSJ x1.0 d/t BMI 30).   estimated protein needs = 70-84 g/day (1.0-1.2 g/kg IBW d/t BMI 30).   estimated fluid needs = 1mL/kcal or per LIP

## 2020-02-12 NOTE — DIETITIAN INITIAL EVALUATION ADULT. - ADD RECOMMEND
Pt to continue to consume >75% meals throughout LOS. Reassess in 7 days. RD will continue to monitor diet order, energy intake, nutrition related labs, body composition, NFPF

## 2020-02-12 NOTE — PROGRESS NOTE ADULT - SUBJECTIVE AND OBJECTIVE BOX
24H events:    Patient is a 74y old Male who presents with a chief complaint of Back Pain (11 Feb 2020 17:44)    Primary diagnosis of Intractable back pain     Today is hospital day 8d. This morning patient was seen and examined at bedside, resting comfortably in bed.     PAST MEDICAL & SURGICAL HISTORY  History of degenerative disc disease  Elevated lipids  Abdominal aortic aneurysm  HTN (hypertension)    SOCIAL HISTORY:  Social History:  Active smoker (6-7 cigarettes daily)   Social drinker   Denies illicit drugs (04 Feb 2020 14:54)      ALLERGIES:  No Known Allergies    MEDICATIONS:  STANDING MEDICATIONS  acetaminophen   Tablet .. 650 milliGRAM(s) Oral every 4 hours  atorvastatin 40 milliGRAM(s) Oral at bedtime  chlorhexidine 4% Liquid 1 Application(s) Topical <User Schedule>  enoxaparin Injectable 40 milliGRAM(s) SubCutaneous at bedtime  ferrous    sulfate 325 milliGRAM(s) Oral daily  gabapentin 300 milliGRAM(s) Oral every 8 hours  hydrochlorothiazide 12.5 milliGRAM(s) Oral daily  lisinopril 40 milliGRAM(s) Oral daily  melatonin 5 milliGRAM(s) Oral at bedtime  methocarbamol 750 milliGRAM(s) Oral every 6 hours  nicotine -  14 mG/24Hr(s) Patch 1 patch Transdermal daily  pantoprazole    Tablet 40 milliGRAM(s) Oral before breakfast  polyethylene glycol 3350 17 Gram(s) Oral daily  senna 2 Tablet(s) Oral at bedtime    PRN MEDICATIONS  aluminum hydroxide/magnesium hydroxide/simethicone Suspension 30 milliLiter(s) Oral every 4 hours PRN  bisacodyl 5 milliGRAM(s) Oral every 12 hours PRN  calcium carbonate    500 mG (Tums) Chewable 1 Tablet(s) Chew every 6 hours PRN  ondansetron Injectable 4 milliGRAM(s) IV Push every 6 hours PRN  oxyCODONE    IR 10 milliGRAM(s) Oral every 4 hours PRN    VITALS:   T(F): 97.2  HR: 77  BP: 158/91  RR: 18  SpO2: 97%    LABS:                        10.8   11.35 )-----------( 239      ( 12 Feb 2020 07:31 )             34.8     02-12    140  |  100  |  20  ----------------------------<  111<H>  4.4   |  25  |  1.0    Ca    8.8      12 Feb 2020 07:31  Mg     2.1     02-11    TPro  6.7  /  Alb  3.8  /  TBili  0.3  /  DBili  x   /  AST  13  /  ALT  14  /  AlkPhos  53  02-11      RADIOLOGY:< from: US Abdomen Limited (02.08.20 @ 11:05) >  IMPRESSION:    Unremarkable right upper quadrant ultrasound.  Evaluation of the pancreas obscured by overlying bowel gas.    < end of copied text >      Outpatient MRI impression:   1-Multilevel degenerative disc disease without evidence of fracture or bone marrow edema   2-L2-L3 demonstrates an extruded disc herniation with focally involving the left paracentral and articular recess regions which demonstrates caudal extension posterior to the L3 vertebral boy. The dis material measures 2.3X0.98 cm with contact and displacement of the intradural left L3 nerve root. There is morederate central spinal canal stenosis with moderate narrowing of the left neuroforamen and mild to moderate narrowing of the righ neuroforamen   3-L3-4 space demonstrates contact of the exiting left L3  nerve root. There is mild to moderate central spinal canal stenosis with mild to moderate narrowing of the left neuroforamen and mild narrowing of the the right neuroforamen   4-L4-5 space demonstrates contact of the intradural L5 nerve roots and the exiting L4 nerve roots. There is facet arthropathy and hypertrophy of the ligamentum flavum more prominent on the left. There is moderate to severe central spinal canal stenosis with moderate to severe narrowing of the right neuroforamen and severe narrowing of the left neuroforamen   5-L5-S1 disc space demonstrates contact and displacement and the intradural left S1 nerve root. There is narrowing of the left articular recess with moderate narrowing of the left neuroforamen and mild narrowing of the right neuroforamen       PHYSICAL EXAM:  GENERAL: NAD  HEAD:  Atraumatic, Normocephalic  EYES: EOMI, PERRLA, conjunctiva and sclera clear  NECK: Supple  NERVOUS SYSTEM:  non focal , AAOx3   CHEST/LUNG: Clear to auscultation bilaterally; No rales, rhonchi, wheezing, or rubs  HEART: Regular rate and rhythm; No murmurs, rubs, or gallops  ABDOMEN: Soft, Nontender, Nondistended; Bowel sounds present  EXTREMITIES:  2+ Peripheral Pulses, No clubbing, cyanosis, or edema      Assessment and Plan:   · Assessment		  Patient is a 74y old Male who presents with a chief complaint of Currently admitted to medicine with the primary diagnosis of Intractable back pain. mri shows multiple disc herniation but with likely one large one at l2-3 causing symptoms. will go to surgery on tuesday?    # Intractable back pain secondary to multiple level disc disease   - Neurology evaluation patient follows with dr wiliam stinson appreciated  - patient had surgery yesterday , no overnight issues , pain improved   - Pain management milad appreciated  - cw pain meds: gabapentin, acetaminophen, robaxin, percocet PRN    - Bowel regimen   - PT/Rehab     # Microcytic Anemia - iron deficiency  - No evidence of melena   - Iron studies show DELLA, started on supplemental iron  - May need outpatient C scope     # HTN   - Continue home meds : lisinopril and HCTZ  - Patient was on aspirin 325 at home unclear reason. Will continue 81 for now (no on record history of CAD)     # DLD  - Continue home meds : simvastatin    # GERD  - Continue home meds :pantoprazole    # AAA - Stable being monitored   - Outpatient follow up with Dr. Sosa     #GI PPX: Pantoprazole   #DVT PPX: Lovenox     #DIET: DASH    Pending:   PT/ Rehab eval

## 2020-02-12 NOTE — PROGRESS NOTE ADULT - ASSESSMENT
74y old Male admitted to medicine with the primary diagnosis of Intractable back pain    # had post-op urine retention  RN did str cath and got out 1 L  start flomax 0.4mg po qhs    # Intractable back pain secondary to multiple level vert disease; L2-3 disc james; spinal stenosis; b/l (L>R) [facility]am[facility]  s/p L2-3 microdiscectomy and possible decompressive laminectomy   Neuro - d/w Dr Wagner - no further w/u; tx pain -  f/u Nsx  f/u Pain mngmt   need rehab eval after sx    # pain reg per pain mngmt  decr Tylenol 650mg po q6 - around the clock  gabapentin 300mg po q8   oxyCODONE IR 10 milliGRAM(s) Oral every 4 hours PRN sev pain  robaxin 750mg po q6  can d/c warm compresses  DC morphine; DC traMADol   avoid NSAIDS given GERD and anemia  cont bowel regimen     # Microcytic Anemia 2/2 DELLA - unsure of cause (? 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 Sosa     # DVT ppx: lovenox    Disposition: f/u Nsx; tx post-op pain; rehab f/u to eval for 4A (? alondra); outpt w/u for DELLA    will see how pt does post-op - perhaps he'll go to 4[facility] rehab (we'll see).

## 2020-02-12 NOTE — DIETITIAN INITIAL EVALUATION ADULT. - FACTORS AFF FOOD INTAKE
most recent BM in EMR 2/4 but pt notes he had BM prior to procedure. RN unsure as first day with pt./none

## 2020-02-12 NOTE — PROGRESS NOTE ADULT - ATTENDING COMMENTS
Patient seen and examined. Pt with new left leg radiculopathy consistent with L3 pattern.   Pt seen and examined. Pt with 4/5 strength in left hip flexion and knee extension.  He endorses pain that radiated down left leg to left shin.   Imaging shows left L2-3 disc herniation with caudal extrusion and impingement of lateral recess.     I advised patient re: management options which include pain control, GREG, and PT vs surgical intervention.  Pt wishes to proceed with surgical intervention given significant pain and failure of conservative treatments in past for similar symptoms.     I discussed with patient potential risks of surgery which include, bleeding, infection, CSF leak, disc reherniation, failure to improve or worsening of symptoms and the need for further procedures.  He and family understand and they wish to proceed.      Plan discussed with neurosurgery Mar Darnell.
Agree with above.
Patient seen and examined independently of resident. My addendum supersedes resident notation. Case discussed with housestaff, nursing and patient
Pt seen and examined at bedside, pt doing well. Admits to mild difficulty with passing urine; however, was able to void.     Motor strength in legs stable, full bilaterally in all muscle groups.    Plan for pain control.  PT in am.     DVT ppx tomorrow.
Pt seen and examined.   Plan to mobilize OOB as tolerated.   DVT ppx.       Agree with above plan.

## 2020-02-12 NOTE — DIETITIAN INITIAL EVALUATION ADULT. - ENERGY INTAKE
Aware of current diet order and GERD recs discussed but pt well educated. no further nutrition intervention. Good (>75%)

## 2020-02-12 NOTE — PROGRESS NOTE ADULT - SUBJECTIVE AND OBJECTIVE BOX
HPI: 74y Male   POD # 1 s/p decompressive laminectomy discectomy L2 - L3     PAST 24 HOURS:   Patient seen and examined at bedside. States radicular pain has been resolving from pre-op, and admits to mild incisional pain. States bladder fullness has improved, but still has slight difficultyl. Denies any numbness, tingling, or radicular pain in extremities. States ambulating well with PT.     PHYSICAL EXAM:  Vital Signs Last 24 Hrs  T(C): 36.2 (12 Feb 2020 05:25), Max: 36.6 (11 Feb 2020 12:40)  T(F): 97.2 (12 Feb 2020 05:25), Max: 97.9 (11 Feb 2020 12:40)  HR: 77 (12 Feb 2020 05:25) (72 - 106)  BP: 158/91 (12 Feb 2020 05:25) (108/63 - 158/91)  BP(mean): --  RR: 18 (12 Feb 2020 05:25) (17 - 20)  SpO2: 97% (11 Feb 2020 20:04) (97% - 99%)    PHYSICAL EXAM   Awake, alert, following commands   MAEx4   B/l UE 5/5   B/l LE 5/5   Sensation intact throughout   Dressing - clean, dry, intact     MEDS:   acetaminophen   Tablet .. 650 milliGRAM(s) Oral every 4 hours  aluminum hydroxide/magnesium hydroxide/simethicone Suspension 30 milliLiter(s) Oral every 4 hours PRN  atorvastatin 40 milliGRAM(s) Oral at bedtime  bisacodyl 5 milliGRAM(s) Oral every 12 hours PRN  calcium carbonate    500 mG (Tums) Chewable 1 Tablet(s) Chew every 6 hours PRN  chlorhexidine 4% Liquid 1 Application(s) Topical <User Schedule>  enoxaparin Injectable 40 milliGRAM(s) SubCutaneous at bedtime  ferrous    sulfate 325 milliGRAM(s) Oral daily  gabapentin 300 milliGRAM(s) Oral every 8 hours  hydrochlorothiazide 12.5 milliGRAM(s) Oral daily  lisinopril 40 milliGRAM(s) Oral daily  melatonin 5 milliGRAM(s) Oral at bedtime  methocarbamol 750 milliGRAM(s) Oral every 6 hours  nicotine -  14 mG/24Hr(s) Patch 1 patch Transdermal daily  ondansetron Injectable 4 milliGRAM(s) IV Push every 6 hours PRN  oxyCODONE    IR 10 milliGRAM(s) Oral every 4 hours PRN  pantoprazole    Tablet 40 milliGRAM(s) Oral before breakfast  polyethylene glycol 3350 17 Gram(s) Oral daily  senna 2 Tablet(s) Oral at bedtime  tamsulosin 0.4 milliGRAM(s) Oral at bedtime      LABS:                        10.8   11.35 )-----------( 239      ( 12 Feb 2020 07:31 )             34.8     02-12    140  |  100  |  20  ----------------------------<  111<H>  4.4   |  25  |  1.0    Ca    8.8      12 Feb 2020 07:31  Mg     2.1     02-11    TPro  6.7  /  Alb  3.8  /  TBili  0.3  /  DBili  x   /  AST  13  /  ALT  14  /  AlkPhos  53  02-11      Assessment - 75 yo M POD # 1 s/p decompressive laminectomy discectomy L2 - L3     PLAN   - Pain control   - DVT ppx   - Continue PT / Rehab   - Will begin Flomax for difficulty passing urine  - S/w for d/c planning   - Will discuss with attending

## 2020-02-12 NOTE — DIETITIAN INITIAL EVALUATION ADULT. - PERTINENT MEDS FT
lovenox, maalox, atorvastatin, dulcolax, ferrous sulfate, gabapentin, hydrochlorothiazide, zofran, protonix, miralax, senna

## 2020-02-13 LAB — GLUCOSE BLDC GLUCOMTR-MCNC: 178 MG/DL — HIGH (ref 70–99)

## 2020-02-13 PROCEDURE — 99232 SBSQ HOSP IP/OBS MODERATE 35: CPT

## 2020-02-13 RX ORDER — GABAPENTIN 400 MG/1
200 CAPSULE ORAL EVERY 8 HOURS
Refills: 0 | Status: DISCONTINUED | OUTPATIENT
Start: 2020-02-13 | End: 2020-02-14

## 2020-02-13 RX ORDER — OXYCODONE HYDROCHLORIDE 5 MG/1
5 TABLET ORAL EVERY 4 HOURS
Refills: 0 | Status: DISCONTINUED | OUTPATIENT
Start: 2020-02-13 | End: 2020-02-14

## 2020-02-13 RX ORDER — METHOCARBAMOL 500 MG/1
500 TABLET, FILM COATED ORAL EVERY 6 HOURS
Refills: 0 | Status: DISCONTINUED | OUTPATIENT
Start: 2020-02-13 | End: 2020-02-14

## 2020-02-13 RX ORDER — SODIUM CHLORIDE 9 MG/ML
500 INJECTION, SOLUTION INTRAVENOUS ONCE
Refills: 0 | Status: COMPLETED | OUTPATIENT
Start: 2020-02-13 | End: 2020-02-13

## 2020-02-13 RX ORDER — ACETAMINOPHEN 500 MG
650 TABLET ORAL EVERY 6 HOURS
Refills: 0 | Status: DISCONTINUED | OUTPATIENT
Start: 2020-02-13 | End: 2020-02-14

## 2020-02-13 RX ADMIN — Medication 1 PATCH: at 12:25

## 2020-02-13 RX ADMIN — Medication 1 PATCH: at 07:59

## 2020-02-13 RX ADMIN — ATORVASTATIN CALCIUM 40 MILLIGRAM(S): 80 TABLET, FILM COATED ORAL at 21:41

## 2020-02-13 RX ADMIN — METHOCARBAMOL 750 MILLIGRAM(S): 500 TABLET, FILM COATED ORAL at 05:10

## 2020-02-13 RX ADMIN — ENOXAPARIN SODIUM 40 MILLIGRAM(S): 100 INJECTION SUBCUTANEOUS at 21:41

## 2020-02-13 RX ADMIN — GABAPENTIN 300 MILLIGRAM(S): 400 CAPSULE ORAL at 13:19

## 2020-02-13 RX ADMIN — PANTOPRAZOLE SODIUM 40 MILLIGRAM(S): 20 TABLET, DELAYED RELEASE ORAL at 05:10

## 2020-02-13 RX ADMIN — SENNA PLUS 2 TABLET(S): 8.6 TABLET ORAL at 21:41

## 2020-02-13 RX ADMIN — Medication 650 MILLIGRAM(S): at 05:39

## 2020-02-13 RX ADMIN — POLYETHYLENE GLYCOL 3350 17 GRAM(S): 17 POWDER, FOR SOLUTION ORAL at 12:25

## 2020-02-13 RX ADMIN — Medication 1 PATCH: at 21:11

## 2020-02-13 RX ADMIN — METHOCARBAMOL 500 MILLIGRAM(S): 500 TABLET, FILM COATED ORAL at 17:46

## 2020-02-13 RX ADMIN — OXYCODONE HYDROCHLORIDE 5 MILLIGRAM(S): 5 TABLET ORAL at 23:30

## 2020-02-13 RX ADMIN — GABAPENTIN 200 MILLIGRAM(S): 400 CAPSULE ORAL at 21:41

## 2020-02-13 RX ADMIN — Medication 12.5 MILLIGRAM(S): at 05:10

## 2020-02-13 RX ADMIN — Medication 5 MILLIGRAM(S): at 21:41

## 2020-02-13 RX ADMIN — METHOCARBAMOL 500 MILLIGRAM(S): 500 TABLET, FILM COATED ORAL at 23:30

## 2020-02-13 RX ADMIN — SODIUM CHLORIDE 2000 MILLILITER(S): 9 INJECTION, SOLUTION INTRAVENOUS at 11:00

## 2020-02-13 RX ADMIN — Medication 1 PATCH: at 12:01

## 2020-02-13 RX ADMIN — METHOCARBAMOL 750 MILLIGRAM(S): 500 TABLET, FILM COATED ORAL at 12:24

## 2020-02-13 RX ADMIN — OXYCODONE HYDROCHLORIDE 10 MILLIGRAM(S): 5 TABLET ORAL at 02:21

## 2020-02-13 RX ADMIN — CHLORHEXIDINE GLUCONATE 1 APPLICATION(S): 213 SOLUTION TOPICAL at 05:10

## 2020-02-13 RX ADMIN — Medication 650 MILLIGRAM(S): at 12:54

## 2020-02-13 RX ADMIN — Medication 650 MILLIGRAM(S): at 05:09

## 2020-02-13 RX ADMIN — TAMSULOSIN HYDROCHLORIDE 0.4 MILLIGRAM(S): 0.4 CAPSULE ORAL at 21:41

## 2020-02-13 RX ADMIN — Medication 650 MILLIGRAM(S): at 17:46

## 2020-02-13 RX ADMIN — Medication 650 MILLIGRAM(S): at 12:24

## 2020-02-13 RX ADMIN — Medication 325 MILLIGRAM(S): at 12:24

## 2020-02-13 RX ADMIN — LISINOPRIL 40 MILLIGRAM(S): 2.5 TABLET ORAL at 05:10

## 2020-02-13 RX ADMIN — Medication 650 MILLIGRAM(S): at 18:16

## 2020-02-13 RX ADMIN — GABAPENTIN 300 MILLIGRAM(S): 400 CAPSULE ORAL at 05:10

## 2020-02-13 NOTE — OCCUPATIONAL THERAPY INITIAL EVALUATION ADULT - PLANNED THERAPY INTERVENTIONS, OT EVAL
ADL retraining/balance training/bed mobility training/ROM/transfer training/strengthening/stretching

## 2020-02-13 NOTE — PROGRESS NOTE ADULT - SUBJECTIVE AND OBJECTIVE BOX
POD # 2    S/P L2-3 Decompressive Laminectomy Diskectomy    Pt seen and examined at bedside. Pt c/o incisional pain at this time. Sts significant improvement in his pre-op lower extremity pain    Vital Signs Last 24 Hrs  T(C): 35.6 (13 Feb 2020 10:20), Max: 37.2 (12 Feb 2020 19:34)  T(F): 96 (13 Feb 2020 10:20), Max: 98.9 (12 Feb 2020 19:34)  HR: 81 (13 Feb 2020 11:24) (74 - 92)  BP: 123/64 (13 Feb 2020 11:24) (87/53 - 123/64)  BP(mean): --  RR: 18 (13 Feb 2020 05:25) (18 - 18)  SpO2: 93% (13 Feb 2020 10:20) (93% - 97%)    PHYSICAL EXAM:          MEDICATIONS:  Antibiotics:    Neuro:  acetaminophen   Tablet .. 650 milliGRAM(s) Oral every 6 hours  gabapentin 300 milliGRAM(s) Oral every 8 hours  melatonin 5 milliGRAM(s) Oral at bedtime  methocarbamol 750 milliGRAM(s) Oral every 6 hours  oxyCODONE    IR 10 milliGRAM(s) Oral every 4 hours PRN    Anticoagulation:  enoxaparin Injectable 40 milliGRAM(s) SubCutaneous at bedtime    OTHER:  aluminum hydroxide/magnesium hydroxide/simethicone Suspension 30 milliLiter(s) Oral every 4 hours PRN  atorvastatin 40 milliGRAM(s) Oral at bedtime  bisacodyl 5 milliGRAM(s) Oral every 12 hours PRN  calcium carbonate    500 mG (Tums) Chewable 1 Tablet(s) Chew every 6 hours PRN  chlorhexidine 4% Liquid 1 Application(s) Topical <User Schedule>  hydrochlorothiazide 12.5 milliGRAM(s) Oral daily  lisinopril 40 milliGRAM(s) Oral daily  nicotine -  14 mG/24Hr(s) Patch 1 patch Transdermal daily  pantoprazole    Tablet 40 milliGRAM(s) Oral before breakfast  polyethylene glycol 3350 17 Gram(s) Oral daily  senna 2 Tablet(s) Oral at bedtime  tamsulosin 0.4 milliGRAM(s) Oral at bedtime    IVF:  ferrous    sulfate 325 milliGRAM(s) Oral daily    Assessment:  As above    Plan:  PT/Rehab  Pain Meds PRN POD # 2    S/P L2-3 Decompressive Laminectomy Diskectomy    Pt seen and examined at bedside. Pt c/o incisional pain at this time. Sts significant improvement in his pre-op lower extremity pain    Vital Signs Last 24 Hrs  T(C): 35.6 (13 Feb 2020 10:20), Max: 37.2 (12 Feb 2020 19:34)  T(F): 96 (13 Feb 2020 10:20), Max: 98.9 (12 Feb 2020 19:34)  HR: 81 (13 Feb 2020 11:24) (74 - 92)  BP: 123/64 (13 Feb 2020 11:24) (87/53 - 123/64)  BP(mean): --  RR: 18 (13 Feb 2020 05:25) (18 - 18)  SpO2: 93% (13 Feb 2020 10:20) (93% - 97%)    PHYSICAL EXAM:  Strength 5/5  Sensation intact to light touch  Dressing C/D/I    MEDICATIONS:  Antibiotics:    Neuro:  acetaminophen   Tablet .. 650 milliGRAM(s) Oral every 6 hours  gabapentin 300 milliGRAM(s) Oral every 8 hours  melatonin 5 milliGRAM(s) Oral at bedtime  methocarbamol 750 milliGRAM(s) Oral every 6 hours  oxyCODONE    IR 10 milliGRAM(s) Oral every 4 hours PRN    Anticoagulation:  enoxaparin Injectable 40 milliGRAM(s) SubCutaneous at bedtime    OTHER:  aluminum hydroxide/magnesium hydroxide/simethicone Suspension 30 milliLiter(s) Oral every 4 hours PRN  atorvastatin 40 milliGRAM(s) Oral at bedtime  bisacodyl 5 milliGRAM(s) Oral every 12 hours PRN  calcium carbonate    500 mG (Tums) Chewable 1 Tablet(s) Chew every 6 hours PRN  chlorhexidine 4% Liquid 1 Application(s) Topical <User Schedule>  hydrochlorothiazide 12.5 milliGRAM(s) Oral daily  lisinopril 40 milliGRAM(s) Oral daily  nicotine -  14 mG/24Hr(s) Patch 1 patch Transdermal daily  pantoprazole    Tablet 40 milliGRAM(s) Oral before breakfast  polyethylene glycol 3350 17 Gram(s) Oral daily  senna 2 Tablet(s) Oral at bedtime  tamsulosin 0.4 milliGRAM(s) Oral at bedtime    IVF:  ferrous    sulfate 325 milliGRAM(s) Oral daily    Assessment:  As above    Plan:  PT/Rehab  Pain Meds PRN

## 2020-02-13 NOTE — OCCUPATIONAL THERAPY INITIAL EVALUATION ADULT - RANGE OF MOTION EXAMINATION, UPPER EXTREMITY
bilateral UE Active ROM was WNL (within normal limits) Keystone Flap Text: The defect edges were debeveled with a #15 scalpel blade.  Given the location of the defect, shape of the defect a keystone flap was deemed most appropriate.  Using a sterile surgical marker, an appropriate keystone flap was drawn incorporating the defect, outlining the appropriate donor tissue and placing the expected incisions within the relaxed skin tension lines where possible. The area thus outlined was incised deep to adipose tissue with a #15 scalpel blade.  The skin margins were undermined to an appropriate distance in all directions around the primary defect and laterally outward around the flap utilizing iris scissors.

## 2020-02-13 NOTE — PROGRESS NOTE ADULT - ASSESSMENT
74y old Male admitted to medicine with the primary diagnosis of Intractable back pain    # post-op orthostasis  lots of reasons for this: dehydration while NPO; anesthetics; recently started HCTZ for HTN; flomax for diff urinating; pain meds; etc.  did better after fluid challenge  lower pain meds (below)    # had post-op urine retention - fine now  RN did str cath and got out 1 L on 2/12  cont flomax 0.4mg po qhs    # minor leukocytosis  just observe  prob stress related    # Intractable back pain secondary to multiple level vert disease; L2-3 disc james; spinal stenosis; b/l (L>R) [facility]aminal stenosis at [facility]  s/p L2-3 microdiscectomy and possible decompressive laminectomy   Neuro - d/w Dr Wagner - no further w/u; tx pain -  f/u Nsx  f/u Pain mngmt   need rehab eval for 4A - pt would benefit from this    # pain reg per pain mngmt  Tylenol 650mg po q6 - around the clock  decr gabapentin 200mg po q8   decr oxyCODONE IR 5 milliGRAM(s) Oral every 4 hours PRN sev pain  decr robaxin 500mg po q6  can d/c warm compresses  DC morphine; DC traMADol   avoid NSAIDS given GERD and anemia  cont bowel regimen     # Microcytic Anemia 2/2 DELLA - unsure of cause (? 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 Sosa     # DVT ppx: lovenox    Disposition: f/u Nsx; tx post-op pain; tx orthostatics; rehab f/u to eval for 4A (anticipate for alondra); outpt w/u for DELLA

## 2020-02-13 NOTE — OCCUPATIONAL THERAPY INITIAL EVALUATION ADULT - SPECIFY REASON(S)
attempted to see pt for OT eval however pt dizzy, and recently returned to bed, per RN BP is low, hold and will attempt in PM

## 2020-02-13 NOTE — PROGRESS NOTE ADULT - SUBJECTIVE AND OBJECTIVE BOX
MEDARDO GUTIERREZ  74y  Male  ***My note supersedes ALL resident notes that I sign.  My corrections for their notes are in my note.***    I can be reached directly on [employer]. My office number is [phone]. My personal cell number is [phone].    INTERVAL EVENTS: Here for f/u of back pain. He had orthostasis today. Did fine after fluid bolus. Pt still saying he cannot go home (very comfortable in the hospital).  Pt being re-eval for 4A rehab (which is a good idea). Pt able to urinate now.    T(F): 97.7 (02-13-20 @ 14:43), Max: 98.9 (02-12-20 @ 19:34)  HR: 82 (02-13-20 @ 14:43) (74 - 91)  BP: 134/70 (02-13-20 @ 14:43) (87/53 - 134/70)  RR: 18 (02-13-20 @ 14:43) (18 - 18)  SpO2: 93% (02-13-20 @ 10:20) (93% - 94%)    02-12-20 @ 07:01  -  02-13-20 @ 07:00  --------------------------------------------------------  IN: 0 mL / OUT: 1050 mL / NET: -1050 mL    Gen: NAD; fairly comfortable and mobile in bed; walking w/ asst  HEENT: WNL  Neck: no JVD, no Nodes  lungs: clr  hrt: s1 s2 rrr  abd soft nt/nd  ext no edema, no c/c  neuro: aa ox3, CN intact, pt has excellent AROM of legs at hips, knees and ankles, no foot drop    LABS:                        10.8    (    79.8   11.35 )-----------( ---------      239      ( 12 Feb 2020 07:31 )             34.8    (    18.8     RADIOLOGY & ADDITIONAL TESTS:      MEDICATIONS:    acetaminophen   Tablet .. 650 milliGRAM(s) Oral every 6 hours  aluminum hydroxide/magnesium hydroxide/simethicone Suspension 30 milliLiter(s) Oral every 4 hours PRN  atorvastatin 40 milliGRAM(s) Oral at bedtime  bisacodyl 5 milliGRAM(s) Oral every 12 hours PRN  calcium carbonate    500 mG (Tums) Chewable 1 Tablet(s) Chew every 6 hours PRN  chlorhexidine 4% Liquid 1 Application(s) Topical <User Schedule>  enoxaparin Injectable 40 milliGRAM(s) SubCutaneous at bedtime  ferrous    sulfate 325 milliGRAM(s) Oral daily  gabapentin 300 milliGRAM(s) Oral every 8 hours  hydrochlorothiazide 12.5 milliGRAM(s) Oral daily  lisinopril 40 milliGRAM(s) Oral daily  melatonin 5 milliGRAM(s) Oral at bedtime  methocarbamol 750 milliGRAM(s) Oral every 6 hours  nicotine -  14 mG/24Hr(s) Patch 1 patch Transdermal daily  oxyCODONE    IR 10 milliGRAM(s) Oral every 4 hours PRN  pantoprazole    Tablet 40 milliGRAM(s) Oral before breakfast  polyethylene glycol 3350 17 Gram(s) Oral daily  senna 2 Tablet(s) Oral at bedtime  tamsulosin 0.4 milliGRAM(s) Oral at bedtime

## 2020-02-13 NOTE — OCCUPATIONAL THERAPY INITIAL EVALUATION ADULT - GENERAL OBSERVATIONS, REHAB EVAL
pt received semi dominguez in bed in NAD, +IV lock, son present at b/s, left seated in b/s chair RN aware

## 2020-02-14 ENCOUNTER — TRANSCRIPTION ENCOUNTER (OUTPATIENT)
Age: 75
End: 2020-02-14

## 2020-02-14 ENCOUNTER — INPATIENT (INPATIENT)
Facility: HOSPITAL | Age: 75
LOS: 11 days | Discharge: HOME HEALTH | End: 2020-02-26
Attending: PHYSICAL MEDICINE & REHABILITATION | Admitting: PHYSICAL MEDICINE & REHABILITATION
Payer: COMMERCIAL

## 2020-02-14 VITALS
SYSTOLIC BLOOD PRESSURE: 121 MMHG | TEMPERATURE: 96 F | DIASTOLIC BLOOD PRESSURE: 63 MMHG | RESPIRATION RATE: 18 BRPM | HEART RATE: 83 BPM

## 2020-02-14 DIAGNOSIS — I71.4 ABDOMINAL AORTIC ANEURYSM, WITHOUT RUPTURE: ICD-10-CM

## 2020-02-14 PROCEDURE — 99239 HOSP IP/OBS DSCHRG MGMT >30: CPT

## 2020-02-14 PROCEDURE — 93010 ELECTROCARDIOGRAM REPORT: CPT

## 2020-02-14 RX ORDER — CALCIUM CARBONATE 500(1250)
1 TABLET ORAL
Qty: 0 | Refills: 0 | DISCHARGE
Start: 2020-02-14

## 2020-02-14 RX ORDER — TAMSULOSIN HYDROCHLORIDE 0.4 MG/1
1 CAPSULE ORAL
Qty: 0 | Refills: 0 | DISCHARGE
Start: 2020-02-14

## 2020-02-14 RX ORDER — LANOLIN ALCOHOL/MO/W.PET/CERES
5 CREAM (GRAM) TOPICAL AT BEDTIME
Refills: 0 | Status: DISCONTINUED | OUTPATIENT
Start: 2020-02-14 | End: 2020-02-26

## 2020-02-14 RX ORDER — GABAPENTIN 400 MG/1
1 CAPSULE ORAL
Qty: 0 | Refills: 0 | DISCHARGE

## 2020-02-14 RX ORDER — CALCIUM CARBONATE 500(1250)
1 TABLET ORAL EVERY 6 HOURS
Refills: 0 | Status: DISCONTINUED | OUTPATIENT
Start: 2020-02-14 | End: 2020-02-26

## 2020-02-14 RX ORDER — LISINOPRIL 2.5 MG/1
1 TABLET ORAL
Qty: 0 | Refills: 0 | DISCHARGE

## 2020-02-14 RX ORDER — NICOTINE POLACRILEX 2 MG
1 GUM BUCCAL DAILY
Refills: 0 | Status: DISCONTINUED | OUTPATIENT
Start: 2020-02-15 | End: 2020-02-26

## 2020-02-14 RX ORDER — METHOCARBAMOL 500 MG/1
500 TABLET, FILM COATED ORAL EVERY 6 HOURS
Refills: 0 | Status: DISCONTINUED | OUTPATIENT
Start: 2020-02-14 | End: 2020-02-26

## 2020-02-14 RX ORDER — NICOTINE POLACRILEX 2 MG
0 GUM BUCCAL
Qty: 0 | Refills: 0 | DISCHARGE
Start: 2020-02-14

## 2020-02-14 RX ORDER — ATORVASTATIN CALCIUM 80 MG/1
40 TABLET, FILM COATED ORAL AT BEDTIME
Refills: 0 | Status: DISCONTINUED | OUTPATIENT
Start: 2020-02-14 | End: 2020-02-26

## 2020-02-14 RX ORDER — ASPIRIN/CALCIUM CARB/MAGNESIUM 324 MG
1 TABLET ORAL
Qty: 0 | Refills: 0 | DISCHARGE

## 2020-02-14 RX ORDER — LISINOPRIL 2.5 MG/1
40 TABLET ORAL DAILY
Refills: 0 | Status: DISCONTINUED | OUTPATIENT
Start: 2020-02-15 | End: 2020-02-18

## 2020-02-14 RX ORDER — SENNA PLUS 8.6 MG/1
2 TABLET ORAL AT BEDTIME
Refills: 0 | Status: DISCONTINUED | OUTPATIENT
Start: 2020-02-14 | End: 2020-02-21

## 2020-02-14 RX ORDER — GABAPENTIN 400 MG/1
200 CAPSULE ORAL EVERY 8 HOURS
Refills: 0 | Status: DISCONTINUED | OUTPATIENT
Start: 2020-02-14 | End: 2020-02-18

## 2020-02-14 RX ORDER — PANTOPRAZOLE SODIUM 20 MG/1
1 TABLET, DELAYED RELEASE ORAL
Qty: 0 | Refills: 0 | DISCHARGE

## 2020-02-14 RX ORDER — TAMSULOSIN HYDROCHLORIDE 0.4 MG/1
0.4 CAPSULE ORAL AT BEDTIME
Refills: 0 | Status: DISCONTINUED | OUTPATIENT
Start: 2020-02-14 | End: 2020-02-26

## 2020-02-14 RX ORDER — SIMVASTATIN 20 MG/1
1 TABLET, FILM COATED ORAL
Qty: 0 | Refills: 0 | DISCHARGE

## 2020-02-14 RX ORDER — ENOXAPARIN SODIUM 100 MG/ML
40 INJECTION SUBCUTANEOUS DAILY
Refills: 0 | Status: DISCONTINUED | OUTPATIENT
Start: 2020-02-15 | End: 2020-02-26

## 2020-02-14 RX ORDER — SENNA PLUS 8.6 MG/1
2 TABLET ORAL
Qty: 0 | Refills: 0 | DISCHARGE
Start: 2020-02-14

## 2020-02-14 RX ORDER — LISINOPRIL 2.5 MG/1
1 TABLET ORAL
Qty: 0 | Refills: 0 | DISCHARGE
Start: 2020-02-14

## 2020-02-14 RX ORDER — MAGNESIUM HYDROXIDE 400 MG/1
30 TABLET, CHEWABLE ORAL DAILY
Refills: 0 | Status: DISCONTINUED | OUTPATIENT
Start: 2020-02-14 | End: 2020-02-26

## 2020-02-14 RX ORDER — PANTOPRAZOLE SODIUM 20 MG/1
1 TABLET, DELAYED RELEASE ORAL
Qty: 0 | Refills: 0 | DISCHARGE
Start: 2020-02-14

## 2020-02-14 RX ORDER — OXYCODONE HYDROCHLORIDE 5 MG/1
5 TABLET ORAL EVERY 4 HOURS
Refills: 0 | Status: DISCONTINUED | OUTPATIENT
Start: 2020-02-14 | End: 2020-02-21

## 2020-02-14 RX ORDER — METHOCARBAMOL 500 MG/1
1 TABLET, FILM COATED ORAL
Qty: 0 | Refills: 0 | DISCHARGE
Start: 2020-02-14

## 2020-02-14 RX ORDER — GABAPENTIN 400 MG/1
2 CAPSULE ORAL
Qty: 0 | Refills: 0 | DISCHARGE
Start: 2020-02-14

## 2020-02-14 RX ORDER — TRAMADOL HYDROCHLORIDE 50 MG/1
1 TABLET ORAL
Qty: 0 | Refills: 0 | DISCHARGE

## 2020-02-14 RX ORDER — OXYCODONE HYDROCHLORIDE 5 MG/1
1 TABLET ORAL
Qty: 0 | Refills: 0 | DISCHARGE
Start: 2020-02-14

## 2020-02-14 RX ORDER — ESCITALOPRAM OXALATE 10 MG/1
1 TABLET, FILM COATED ORAL
Qty: 0 | Refills: 0 | DISCHARGE

## 2020-02-14 RX ORDER — HYDROCHLOROTHIAZIDE 25 MG
12.5 TABLET ORAL DAILY
Refills: 0 | Status: DISCONTINUED | OUTPATIENT
Start: 2020-02-15 | End: 2020-02-19

## 2020-02-14 RX ORDER — ATORVASTATIN CALCIUM 80 MG/1
1 TABLET, FILM COATED ORAL
Qty: 0 | Refills: 0 | DISCHARGE
Start: 2020-02-14

## 2020-02-14 RX ORDER — ACETAMINOPHEN 500 MG
650 TABLET ORAL EVERY 6 HOURS
Refills: 0 | Status: DISCONTINUED | OUTPATIENT
Start: 2020-02-14 | End: 2020-02-26

## 2020-02-14 RX ORDER — POLYETHYLENE GLYCOL 3350 17 G/17G
17 POWDER, FOR SOLUTION ORAL
Qty: 0 | Refills: 0 | DISCHARGE
Start: 2020-02-14

## 2020-02-14 RX ORDER — POLYETHYLENE GLYCOL 3350 17 G/17G
17 POWDER, FOR SOLUTION ORAL DAILY
Refills: 0 | Status: DISCONTINUED | OUTPATIENT
Start: 2020-02-15 | End: 2020-02-26

## 2020-02-14 RX ORDER — PANTOPRAZOLE SODIUM 20 MG/1
40 TABLET, DELAYED RELEASE ORAL
Refills: 0 | Status: DISCONTINUED | OUTPATIENT
Start: 2020-02-14 | End: 2020-02-26

## 2020-02-14 RX ORDER — IBUPROFEN 200 MG
1 TABLET ORAL
Qty: 0 | Refills: 0 | DISCHARGE

## 2020-02-14 RX ORDER — ACETAMINOPHEN 500 MG
2 TABLET ORAL
Qty: 0 | Refills: 0 | DISCHARGE
Start: 2020-02-14

## 2020-02-14 RX ORDER — LANOLIN ALCOHOL/MO/W.PET/CERES
1 CREAM (GRAM) TOPICAL
Qty: 0 | Refills: 0 | DISCHARGE
Start: 2020-02-14

## 2020-02-14 RX ADMIN — Medication 5 MILLIGRAM(S): at 21:35

## 2020-02-14 RX ADMIN — METHOCARBAMOL 500 MILLIGRAM(S): 500 TABLET, FILM COATED ORAL at 23:02

## 2020-02-14 RX ADMIN — Medication 650 MILLIGRAM(S): at 23:02

## 2020-02-14 RX ADMIN — METHOCARBAMOL 500 MILLIGRAM(S): 500 TABLET, FILM COATED ORAL at 12:12

## 2020-02-14 RX ADMIN — Medication 650 MILLIGRAM(S): at 05:00

## 2020-02-14 RX ADMIN — Medication 325 MILLIGRAM(S): at 12:13

## 2020-02-14 RX ADMIN — ATORVASTATIN CALCIUM 40 MILLIGRAM(S): 80 TABLET, FILM COATED ORAL at 21:35

## 2020-02-14 RX ADMIN — GABAPENTIN 200 MILLIGRAM(S): 400 CAPSULE ORAL at 12:12

## 2020-02-14 RX ADMIN — POLYETHYLENE GLYCOL 3350 17 GRAM(S): 17 POWDER, FOR SOLUTION ORAL at 12:13

## 2020-02-14 RX ADMIN — GABAPENTIN 200 MILLIGRAM(S): 400 CAPSULE ORAL at 05:00

## 2020-02-14 RX ADMIN — SENNA PLUS 2 TABLET(S): 8.6 TABLET ORAL at 21:37

## 2020-02-14 RX ADMIN — TAMSULOSIN HYDROCHLORIDE 0.4 MILLIGRAM(S): 0.4 CAPSULE ORAL at 21:35

## 2020-02-14 RX ADMIN — Medication 1 PATCH: at 12:13

## 2020-02-14 RX ADMIN — OXYCODONE HYDROCHLORIDE 5 MILLIGRAM(S): 5 TABLET ORAL at 20:55

## 2020-02-14 RX ADMIN — METHOCARBAMOL 500 MILLIGRAM(S): 500 TABLET, FILM COATED ORAL at 05:00

## 2020-02-14 RX ADMIN — LISINOPRIL 40 MILLIGRAM(S): 2.5 TABLET ORAL at 05:00

## 2020-02-14 RX ADMIN — CHLORHEXIDINE GLUCONATE 1 APPLICATION(S): 213 SOLUTION TOPICAL at 05:00

## 2020-02-14 RX ADMIN — Medication 650 MILLIGRAM(S): at 06:00

## 2020-02-14 RX ADMIN — Medication 12.5 MILLIGRAM(S): at 05:00

## 2020-02-14 RX ADMIN — OXYCODONE HYDROCHLORIDE 5 MILLIGRAM(S): 5 TABLET ORAL at 00:00

## 2020-02-14 RX ADMIN — PANTOPRAZOLE SODIUM 40 MILLIGRAM(S): 20 TABLET, DELAYED RELEASE ORAL at 05:00

## 2020-02-14 RX ADMIN — Medication 650 MILLIGRAM(S): at 12:12

## 2020-02-14 RX ADMIN — GABAPENTIN 200 MILLIGRAM(S): 400 CAPSULE ORAL at 21:35

## 2020-02-14 RX ADMIN — OXYCODONE HYDROCHLORIDE 5 MILLIGRAM(S): 5 TABLET ORAL at 05:01

## 2020-02-14 NOTE — PROGRESS NOTE ADULT - SUBJECTIVE AND OBJECTIVE BOX
MEDARDO GUTIERREZ  74y  Male  ***My note supersedes ALL resident notes that I sign.  My corrections for their notes are in my note.***    I can be reached directly on [employer]. My office number is [phone]. My personal cell number is [phone].    INTERVAL EVENTS: Here for f/u back pain. Pt is continuing to improve. No issues w/ urinating. No dizziness today. Pt agreeable to rehab.    T(F): 97.4 (02-14-20 @ 05:30), Max: 97.7 (02-13-20 @ 14:43)  HR: 76 (02-14-20 @ 05:30) (76 - 88)  BP: 131/71 (02-14-20 @ 05:30) (120/60 - 134/70)  RR: 18 (02-14-20 @ 05:30) (18 - 18)  SpO2: 97% (02-14-20 @ 09:01) (97% - 97%)    Gen: NAD; fairly comfortable and mobile in bed; walking w/ asst  HEENT: WNL  Neck: no JVD, no Nodes  lungs: clr  hrt: s1 s2 rrr  abd soft nt/nd  ext no edema, no c/c  neuro: aa ox3, CN intact, pt has excellent AROM of legs at hips, knees and ankles, no foot drop    LABS:    RADIOLOGY & ADDITIONAL TESTS:    MEDICATIONS:    acetaminophen   Tablet .. 650 milliGRAM(s) Oral every 6 hours  aluminum hydroxide/magnesium hydroxide/simethicone Suspension 30 milliLiter(s) Oral every 4 hours PRN  atorvastatin 40 milliGRAM(s) Oral at bedtime  bisacodyl 5 milliGRAM(s) Oral every 12 hours PRN  calcium carbonate    500 mG (Tums) Chewable 1 Tablet(s) Chew every 6 hours PRN  chlorhexidine 4% Liquid 1 Application(s) Topical <User Schedule>  enoxaparin Injectable 40 milliGRAM(s) SubCutaneous at bedtime  ferrous    sulfate 325 milliGRAM(s) Oral daily  gabapentin 200 milliGRAM(s) Oral every 8 hours  hydrochlorothiazide 12.5 milliGRAM(s) Oral daily  lisinopril 40 milliGRAM(s) Oral daily  melatonin 5 milliGRAM(s) Oral at bedtime  methocarbamol 500 milliGRAM(s) Oral every 6 hours  nicotine -  14 mG/24Hr(s) Patch 1 patch Transdermal daily  oxyCODONE    IR 5 milliGRAM(s) Oral every 4 hours PRN  pantoprazole    Tablet 40 milliGRAM(s) Oral before breakfast  polyethylene glycol 3350 17 Gram(s) Oral daily  senna 2 Tablet(s) Oral at bedtime  tamsulosin 0.4 milliGRAM(s) Oral at bedtime

## 2020-02-14 NOTE — PROGRESS NOTE ADULT - REASON FOR ADMISSION
Back Pain

## 2020-02-14 NOTE — DISCHARGE NOTE NURSING/CASE MANAGEMENT/SOCIAL WORK - PATIENT PORTAL LINK FT
You can access the FollowMyHealth Patient Portal offered by [facility] by registering at the following website: [email or web address]. By joining [employer]’s FollowMyHealth portal, you will also be able to view your health information using other applications (apps) compatible with our system.

## 2020-02-14 NOTE — DISCHARGE NOTE NURSING/CASE MANAGEMENT/SOCIAL WORK - PATIENT PORTAL LINK FT
You can access the FollowMyHealth Patient Portal offered by [facility] by registering at the following website: [email or web address]. By joining [employer]’s FollowMyHealth portal, you will also be able to view your health information using other applications (apps) compatible with our system.

## 2020-02-14 NOTE — PROGRESS NOTE ADULT - ASSESSMENT
74y old Man admitted to medicine with the primary diagnosis of Intractable back pain    # post-op orthostasis - seems resolved now  lots of reasons for this: dehydration while NPO; anesthetics; recently started HCTZ for HTN; flomax for diff urinating; pain meds; etc.  better after fluid bolus  lowered pain meds (below)    # had post-op urine retention - fine now  RN did str cath and got out 1 L on 2/12  cont flomax 0.4mg po qhs    # minor leukocytosis  just observe  prob stress related    # Intractable back pain secondary to multiple level vert disease; L2-3 disc james; spinal stenosis; b/l (L>R) [facility]  s/p L2-3 microdiscectomy and possible decompressive laminectomy   Neuro - d/w Dr Wagner - no further w/u; tx pain -  f/u Nsx  f/u Pain mngmt   need rehab eval for 4A - pt would benefit from this    # pain reg per pain mngmt  Tylenol 650mg po q6 - around the clock  gabapentin 200mg po q8   oxyCODONE IR 5 milliGRAM(s) Oral every 4 hours PRN sev pain  robaxin 500mg po q6  can d/c warm compresses  DC morphine; DC traMADol   avoid NSAIDS given GERD and anemia  cont bowel regimen     # Microcytic Anemia 2/2 DELLA - unsure of cause (? 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 Sosa     # DVT ppx: lovenox    Disposition: tx post-op pain; rehab f/u to eval for 4A; outpt w/u for DELLA; stable for d/c today 74y old Man admitted to medicine with the primary diagnosis of Intractable back pain    # post-op orthostasis - seems resolved now  lots of reasons for this: dehydration while NPO; anesthetics; recently started HCTZ for HTN; flomax for diff urinating; pain meds; etc.  better after fluid bolus  lowered pain meds (below)    # had post-op urine retention - fine now  RN did str cath and got out 1 L on 2/12  cont flomax 0.4mg po qhs    # minor leukocytosis  just observe  prob stress related    # Intractable back pain secondary to multiple level vert disease; L2-3 disc james; spinal stenosis; b/l (L>R) [facility]inal stenosis at [facility]  s/p L2-3 microdiscectomy and possible decompressive laminectomy   Neuro - d/w Dr Wagner - no further w/u; tx pain -  f/u Nsx  f/u Pain mngmt   need rehab eval for 4A - pt would benefit from this    # pain reg per pain mngmt  Tylenol 650mg po q6 - around the clock  gabapentin 200mg po q8   oxyCODONE IR 5 milliGRAM(s) Oral every 4 hours PRN sev pain  robaxin 500mg po q6  can d/c warm compresses  DC morphine; DC traMADol   avoid NSAIDS given GERD and anemia  cont bowel regimen     # Microcytic Anemia 2/2 DELLA - unsure of cause (? 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 Sosa     # BMI 30.1 = obesity  wt loss advised  no strenuous activity until after recovered from back sx; OK to do rehab    # DVT ppx: lovenox    Disposition: tx post-op pain; rehab f/u to eval for 4A; outpt w/u for DELLA; stable for d/c today

## 2020-02-15 PROBLEM — E78.5 HYPERLIPIDEMIA, UNSPECIFIED: Chronic | Status: ACTIVE | Noted: 2020-02-04

## 2020-02-15 PROBLEM — I71.4 ABDOMINAL AORTIC ANEURYSM, WITHOUT RUPTURE: Chronic | Status: ACTIVE | Noted: 2020-02-04

## 2020-02-15 PROBLEM — I10 ESSENTIAL (PRIMARY) HYPERTENSION: Chronic | Status: ACTIVE | Noted: 2020-02-04

## 2020-02-15 PROBLEM — Z87.39 PERSONAL HISTORY OF OTHER DISEASES OF THE MUSCULOSKELETAL SYSTEM AND CONNECTIVE TISSUE: Chronic | Status: ACTIVE | Noted: 2020-02-04

## 2020-02-15 LAB
ALBUMIN SERPL ELPH-MCNC: 3.7 G/DL — SIGNIFICANT CHANGE UP (ref 3.5–5.2)
ALP SERPL-CCNC: 64 U/L — SIGNIFICANT CHANGE UP (ref 30–115)
ALT FLD-CCNC: 12 U/L — SIGNIFICANT CHANGE UP (ref 0–41)
ANION GAP SERPL CALC-SCNC: 14 MMOL/L — SIGNIFICANT CHANGE UP (ref 7–14)
APPEARANCE UR: CLEAR — SIGNIFICANT CHANGE UP
AST SERPL-CCNC: 15 U/L — SIGNIFICANT CHANGE UP (ref 0–41)
BASOPHILS # BLD AUTO: 0.06 K/UL — SIGNIFICANT CHANGE UP (ref 0–0.2)
BASOPHILS NFR BLD AUTO: 0.7 % — SIGNIFICANT CHANGE UP (ref 0–1)
BILIRUB SERPL-MCNC: <0.2 MG/DL — SIGNIFICANT CHANGE UP (ref 0.2–1.2)
BILIRUB UR-MCNC: NEGATIVE — SIGNIFICANT CHANGE UP
BUN SERPL-MCNC: 24 MG/DL — HIGH (ref 10–20)
CALCIUM SERPL-MCNC: 8.8 MG/DL — SIGNIFICANT CHANGE UP (ref 8.5–10.1)
CHLORIDE SERPL-SCNC: 101 MMOL/L — SIGNIFICANT CHANGE UP (ref 98–110)
CO2 SERPL-SCNC: 22 MMOL/L — SIGNIFICANT CHANGE UP (ref 17–32)
COLOR SPEC: YELLOW — SIGNIFICANT CHANGE UP
CREAT SERPL-MCNC: 1.1 MG/DL — SIGNIFICANT CHANGE UP (ref 0.7–1.5)
DIFF PNL FLD: NEGATIVE — SIGNIFICANT CHANGE UP
EOSINOPHIL # BLD AUTO: 0.33 K/UL — SIGNIFICANT CHANGE UP (ref 0–0.7)
EOSINOPHIL NFR BLD AUTO: 3.9 % — SIGNIFICANT CHANGE UP (ref 0–8)
GLUCOSE SERPL-MCNC: 139 MG/DL — HIGH (ref 70–99)
GLUCOSE UR QL: NEGATIVE — SIGNIFICANT CHANGE UP
HCT VFR BLD CALC: 35.4 % — LOW (ref 42–52)
HGB BLD-MCNC: 10.7 G/DL — LOW (ref 14–18)
IMM GRANULOCYTES NFR BLD AUTO: 1.1 % — HIGH (ref 0.1–0.3)
KETONES UR-MCNC: NEGATIVE — SIGNIFICANT CHANGE UP
LEUKOCYTE ESTERASE UR-ACNC: NEGATIVE — SIGNIFICANT CHANGE UP
LYMPHOCYTES # BLD AUTO: 2.44 K/UL — SIGNIFICANT CHANGE UP (ref 1.2–3.4)
LYMPHOCYTES # BLD AUTO: 28.6 % — SIGNIFICANT CHANGE UP (ref 20.5–51.1)
MAGNESIUM SERPL-MCNC: 2 MG/DL — SIGNIFICANT CHANGE UP (ref 1.8–2.4)
MCHC RBC-ENTMCNC: 23.6 PG — LOW (ref 27–31)
MCHC RBC-ENTMCNC: 30.2 G/DL — LOW (ref 32–37)
MCV RBC AUTO: 78.1 FL — LOW (ref 80–94)
MONOCYTES # BLD AUTO: 0.74 K/UL — HIGH (ref 0.1–0.6)
MONOCYTES NFR BLD AUTO: 8.7 % — SIGNIFICANT CHANGE UP (ref 1.7–9.3)
NEUTROPHILS # BLD AUTO: 4.88 K/UL — SIGNIFICANT CHANGE UP (ref 1.4–6.5)
NEUTROPHILS NFR BLD AUTO: 57 % — SIGNIFICANT CHANGE UP (ref 42.2–75.2)
NITRITE UR-MCNC: NEGATIVE — SIGNIFICANT CHANGE UP
NRBC # BLD: 0 /100 WBCS — SIGNIFICANT CHANGE UP (ref 0–0)
PH UR: 6 — SIGNIFICANT CHANGE UP (ref 5–8)
PLATELET # BLD AUTO: 250 K/UL — SIGNIFICANT CHANGE UP (ref 130–400)
POTASSIUM SERPL-MCNC: 4.6 MMOL/L — SIGNIFICANT CHANGE UP (ref 3.5–5)
POTASSIUM SERPL-SCNC: 4.6 MMOL/L — SIGNIFICANT CHANGE UP (ref 3.5–5)
PROT SERPL-MCNC: 6.6 G/DL — SIGNIFICANT CHANGE UP (ref 6–8)
PROT UR-MCNC: SIGNIFICANT CHANGE UP
RBC # BLD: 4.53 M/UL — LOW (ref 4.7–6.1)
RBC # FLD: 18.8 % — HIGH (ref 11.5–14.5)
SODIUM SERPL-SCNC: 137 MMOL/L — SIGNIFICANT CHANGE UP (ref 135–146)
SP GR SPEC: 1.02 — SIGNIFICANT CHANGE UP (ref 1.01–1.02)
UROBILINOGEN FLD QL: SIGNIFICANT CHANGE UP
WBC # BLD: 8.54 K/UL — SIGNIFICANT CHANGE UP (ref 4.8–10.8)
WBC # FLD AUTO: 8.54 K/UL — SIGNIFICANT CHANGE UP (ref 4.8–10.8)

## 2020-02-15 RX ADMIN — Medication 650 MILLIGRAM(S): at 12:58

## 2020-02-15 RX ADMIN — SENNA PLUS 2 TABLET(S): 8.6 TABLET ORAL at 21:09

## 2020-02-15 RX ADMIN — METHOCARBAMOL 500 MILLIGRAM(S): 500 TABLET, FILM COATED ORAL at 18:18

## 2020-02-15 RX ADMIN — Medication 5 MILLIGRAM(S): at 21:09

## 2020-02-15 RX ADMIN — Medication 650 MILLIGRAM(S): at 23:47

## 2020-02-15 RX ADMIN — METHOCARBAMOL 500 MILLIGRAM(S): 500 TABLET, FILM COATED ORAL at 12:58

## 2020-02-15 RX ADMIN — OXYCODONE HYDROCHLORIDE 5 MILLIGRAM(S): 5 TABLET ORAL at 07:34

## 2020-02-15 RX ADMIN — Medication 1 PATCH: at 18:17

## 2020-02-15 RX ADMIN — GABAPENTIN 200 MILLIGRAM(S): 400 CAPSULE ORAL at 05:58

## 2020-02-15 RX ADMIN — Medication 1 PATCH: at 13:00

## 2020-02-15 RX ADMIN — GABAPENTIN 200 MILLIGRAM(S): 400 CAPSULE ORAL at 13:08

## 2020-02-15 RX ADMIN — Medication 650 MILLIGRAM(S): at 18:18

## 2020-02-15 RX ADMIN — ENOXAPARIN SODIUM 40 MILLIGRAM(S): 100 INJECTION SUBCUTANEOUS at 13:07

## 2020-02-15 RX ADMIN — OXYCODONE HYDROCHLORIDE 5 MILLIGRAM(S): 5 TABLET ORAL at 03:28

## 2020-02-15 RX ADMIN — Medication 650 MILLIGRAM(S): at 13:07

## 2020-02-15 RX ADMIN — OXYCODONE HYDROCHLORIDE 5 MILLIGRAM(S): 5 TABLET ORAL at 14:51

## 2020-02-15 RX ADMIN — GABAPENTIN 200 MILLIGRAM(S): 400 CAPSULE ORAL at 21:09

## 2020-02-15 RX ADMIN — Medication 650 MILLIGRAM(S): at 05:58

## 2020-02-15 RX ADMIN — Medication 650 MILLIGRAM(S): at 16:51

## 2020-02-15 RX ADMIN — METHOCARBAMOL 500 MILLIGRAM(S): 500 TABLET, FILM COATED ORAL at 23:47

## 2020-02-15 RX ADMIN — OXYCODONE HYDROCHLORIDE 5 MILLIGRAM(S): 5 TABLET ORAL at 22:37

## 2020-02-15 RX ADMIN — POLYETHYLENE GLYCOL 3350 17 GRAM(S): 17 POWDER, FOR SOLUTION ORAL at 13:08

## 2020-02-15 RX ADMIN — OXYCODONE HYDROCHLORIDE 5 MILLIGRAM(S): 5 TABLET ORAL at 20:38

## 2020-02-15 RX ADMIN — OXYCODONE HYDROCHLORIDE 5 MILLIGRAM(S): 5 TABLET ORAL at 07:33

## 2020-02-15 RX ADMIN — ATORVASTATIN CALCIUM 40 MILLIGRAM(S): 80 TABLET, FILM COATED ORAL at 21:09

## 2020-02-15 RX ADMIN — TAMSULOSIN HYDROCHLORIDE 0.4 MILLIGRAM(S): 0.4 CAPSULE ORAL at 21:09

## 2020-02-15 RX ADMIN — PANTOPRAZOLE SODIUM 40 MILLIGRAM(S): 20 TABLET, DELAYED RELEASE ORAL at 05:58

## 2020-02-15 RX ADMIN — OXYCODONE HYDROCHLORIDE 5 MILLIGRAM(S): 5 TABLET ORAL at 18:18

## 2020-02-15 RX ADMIN — METHOCARBAMOL 500 MILLIGRAM(S): 500 TABLET, FILM COATED ORAL at 05:58

## 2020-02-16 RX ADMIN — Medication 650 MILLIGRAM(S): at 12:14

## 2020-02-16 RX ADMIN — SENNA PLUS 2 TABLET(S): 8.6 TABLET ORAL at 21:41

## 2020-02-16 RX ADMIN — Medication 650 MILLIGRAM(S): at 03:45

## 2020-02-16 RX ADMIN — GABAPENTIN 200 MILLIGRAM(S): 400 CAPSULE ORAL at 21:41

## 2020-02-16 RX ADMIN — Medication 650 MILLIGRAM(S): at 06:17

## 2020-02-16 RX ADMIN — LISINOPRIL 40 MILLIGRAM(S): 2.5 TABLET ORAL at 06:18

## 2020-02-16 RX ADMIN — Medication 1 PATCH: at 12:14

## 2020-02-16 RX ADMIN — TAMSULOSIN HYDROCHLORIDE 0.4 MILLIGRAM(S): 0.4 CAPSULE ORAL at 21:42

## 2020-02-16 RX ADMIN — METHOCARBAMOL 500 MILLIGRAM(S): 500 TABLET, FILM COATED ORAL at 06:23

## 2020-02-16 RX ADMIN — ATORVASTATIN CALCIUM 40 MILLIGRAM(S): 80 TABLET, FILM COATED ORAL at 21:41

## 2020-02-16 RX ADMIN — Medication 1 PATCH: at 06:28

## 2020-02-16 RX ADMIN — Medication 1 PATCH: at 21:50

## 2020-02-16 RX ADMIN — OXYCODONE HYDROCHLORIDE 5 MILLIGRAM(S): 5 TABLET ORAL at 16:22

## 2020-02-16 RX ADMIN — OXYCODONE HYDROCHLORIDE 5 MILLIGRAM(S): 5 TABLET ORAL at 16:24

## 2020-02-16 RX ADMIN — Medication 650 MILLIGRAM(S): at 23:42

## 2020-02-16 RX ADMIN — ENOXAPARIN SODIUM 40 MILLIGRAM(S): 100 INJECTION SUBCUTANEOUS at 12:16

## 2020-02-16 RX ADMIN — METHOCARBAMOL 500 MILLIGRAM(S): 500 TABLET, FILM COATED ORAL at 23:42

## 2020-02-16 RX ADMIN — METHOCARBAMOL 500 MILLIGRAM(S): 500 TABLET, FILM COATED ORAL at 17:32

## 2020-02-16 RX ADMIN — Medication 650 MILLIGRAM(S): at 17:33

## 2020-02-16 RX ADMIN — METHOCARBAMOL 500 MILLIGRAM(S): 500 TABLET, FILM COATED ORAL at 12:14

## 2020-02-16 RX ADMIN — Medication 5 MILLIGRAM(S): at 21:41

## 2020-02-16 RX ADMIN — GABAPENTIN 200 MILLIGRAM(S): 400 CAPSULE ORAL at 12:14

## 2020-02-16 RX ADMIN — Medication 650 MILLIGRAM(S): at 12:17

## 2020-02-16 RX ADMIN — GABAPENTIN 200 MILLIGRAM(S): 400 CAPSULE ORAL at 06:18

## 2020-02-16 RX ADMIN — Medication 12.5 MILLIGRAM(S): at 06:18

## 2020-02-16 RX ADMIN — Medication 1 PATCH: at 12:17

## 2020-02-16 RX ADMIN — Medication 650 MILLIGRAM(S): at 17:32

## 2020-02-16 RX ADMIN — PANTOPRAZOLE SODIUM 40 MILLIGRAM(S): 20 TABLET, DELAYED RELEASE ORAL at 06:18

## 2020-02-16 RX ADMIN — Medication 650 MILLIGRAM(S): at 20:27

## 2020-02-17 RX ORDER — SODIUM CHLORIDE 9 MG/ML
1000 INJECTION INTRAMUSCULAR; INTRAVENOUS; SUBCUTANEOUS
Refills: 0 | Status: DISCONTINUED | OUTPATIENT
Start: 2020-02-17 | End: 2020-02-18

## 2020-02-17 RX ADMIN — Medication 650 MILLIGRAM(S): at 06:18

## 2020-02-17 RX ADMIN — ATORVASTATIN CALCIUM 40 MILLIGRAM(S): 80 TABLET, FILM COATED ORAL at 21:04

## 2020-02-17 RX ADMIN — SODIUM CHLORIDE 75 MILLILITER(S): 9 INJECTION INTRAMUSCULAR; INTRAVENOUS; SUBCUTANEOUS at 10:12

## 2020-02-17 RX ADMIN — Medication 650 MILLIGRAM(S): at 03:36

## 2020-02-17 RX ADMIN — Medication 5 MILLIGRAM(S): at 21:04

## 2020-02-17 RX ADMIN — METHOCARBAMOL 500 MILLIGRAM(S): 500 TABLET, FILM COATED ORAL at 06:06

## 2020-02-17 RX ADMIN — OXYCODONE HYDROCHLORIDE 5 MILLIGRAM(S): 5 TABLET ORAL at 05:22

## 2020-02-17 RX ADMIN — Medication 1 PATCH: at 06:18

## 2020-02-17 RX ADMIN — Medication 650 MILLIGRAM(S): at 23:51

## 2020-02-17 RX ADMIN — METHOCARBAMOL 500 MILLIGRAM(S): 500 TABLET, FILM COATED ORAL at 17:05

## 2020-02-17 RX ADMIN — LISINOPRIL 40 MILLIGRAM(S): 2.5 TABLET ORAL at 06:06

## 2020-02-17 RX ADMIN — ENOXAPARIN SODIUM 40 MILLIGRAM(S): 100 INJECTION SUBCUTANEOUS at 12:03

## 2020-02-17 RX ADMIN — GABAPENTIN 200 MILLIGRAM(S): 400 CAPSULE ORAL at 12:04

## 2020-02-17 RX ADMIN — Medication 650 MILLIGRAM(S): at 17:04

## 2020-02-17 RX ADMIN — Medication 650 MILLIGRAM(S): at 06:06

## 2020-02-17 RX ADMIN — Medication 650 MILLIGRAM(S): at 12:02

## 2020-02-17 RX ADMIN — PANTOPRAZOLE SODIUM 40 MILLIGRAM(S): 20 TABLET, DELAYED RELEASE ORAL at 06:06

## 2020-02-17 RX ADMIN — OXYCODONE HYDROCHLORIDE 5 MILLIGRAM(S): 5 TABLET ORAL at 21:04

## 2020-02-17 RX ADMIN — TAMSULOSIN HYDROCHLORIDE 0.4 MILLIGRAM(S): 0.4 CAPSULE ORAL at 21:04

## 2020-02-17 RX ADMIN — GABAPENTIN 200 MILLIGRAM(S): 400 CAPSULE ORAL at 21:04

## 2020-02-17 RX ADMIN — METHOCARBAMOL 500 MILLIGRAM(S): 500 TABLET, FILM COATED ORAL at 12:02

## 2020-02-17 RX ADMIN — Medication 12.5 MILLIGRAM(S): at 06:06

## 2020-02-17 RX ADMIN — METHOCARBAMOL 500 MILLIGRAM(S): 500 TABLET, FILM COATED ORAL at 23:51

## 2020-02-17 RX ADMIN — OXYCODONE HYDROCHLORIDE 5 MILLIGRAM(S): 5 TABLET ORAL at 06:05

## 2020-02-17 RX ADMIN — Medication 650 MILLIGRAM(S): at 17:05

## 2020-02-17 RX ADMIN — Medication 1 PATCH: at 12:03

## 2020-02-17 RX ADMIN — GABAPENTIN 200 MILLIGRAM(S): 400 CAPSULE ORAL at 06:06

## 2020-02-18 RX ORDER — LISINOPRIL 2.5 MG/1
20 TABLET ORAL DAILY
Refills: 0 | Status: DISCONTINUED | OUTPATIENT
Start: 2020-02-18 | End: 2020-02-19

## 2020-02-18 RX ORDER — SODIUM CHLORIDE 9 MG/ML
1000 INJECTION INTRAMUSCULAR; INTRAVENOUS; SUBCUTANEOUS
Refills: 0 | Status: DISCONTINUED | OUTPATIENT
Start: 2020-02-18 | End: 2020-02-18

## 2020-02-18 RX ORDER — GABAPENTIN 400 MG/1
300 CAPSULE ORAL EVERY 8 HOURS
Refills: 0 | Status: DISCONTINUED | OUTPATIENT
Start: 2020-02-18 | End: 2020-02-21

## 2020-02-18 RX ADMIN — METHOCARBAMOL 500 MILLIGRAM(S): 500 TABLET, FILM COATED ORAL at 11:49

## 2020-02-18 RX ADMIN — METHOCARBAMOL 500 MILLIGRAM(S): 500 TABLET, FILM COATED ORAL at 06:08

## 2020-02-18 RX ADMIN — OXYCODONE HYDROCHLORIDE 5 MILLIGRAM(S): 5 TABLET ORAL at 21:40

## 2020-02-18 RX ADMIN — SENNA PLUS 2 TABLET(S): 8.6 TABLET ORAL at 21:40

## 2020-02-18 RX ADMIN — Medication 650 MILLIGRAM(S): at 07:00

## 2020-02-18 RX ADMIN — Medication 650 MILLIGRAM(S): at 00:30

## 2020-02-18 RX ADMIN — Medication 650 MILLIGRAM(S): at 06:07

## 2020-02-18 RX ADMIN — PANTOPRAZOLE SODIUM 40 MILLIGRAM(S): 20 TABLET, DELAYED RELEASE ORAL at 06:08

## 2020-02-18 RX ADMIN — OXYCODONE HYDROCHLORIDE 5 MILLIGRAM(S): 5 TABLET ORAL at 01:22

## 2020-02-18 RX ADMIN — Medication 1 PATCH: at 06:13

## 2020-02-18 RX ADMIN — TAMSULOSIN HYDROCHLORIDE 0.4 MILLIGRAM(S): 0.4 CAPSULE ORAL at 21:40

## 2020-02-18 RX ADMIN — Medication 650 MILLIGRAM(S): at 23:19

## 2020-02-18 RX ADMIN — Medication 650 MILLIGRAM(S): at 23:15

## 2020-02-18 RX ADMIN — METHOCARBAMOL 500 MILLIGRAM(S): 500 TABLET, FILM COATED ORAL at 23:15

## 2020-02-18 RX ADMIN — OXYCODONE HYDROCHLORIDE 5 MILLIGRAM(S): 5 TABLET ORAL at 20:23

## 2020-02-18 RX ADMIN — GABAPENTIN 300 MILLIGRAM(S): 400 CAPSULE ORAL at 14:45

## 2020-02-18 RX ADMIN — OXYCODONE HYDROCHLORIDE 5 MILLIGRAM(S): 5 TABLET ORAL at 01:40

## 2020-02-18 RX ADMIN — ENOXAPARIN SODIUM 40 MILLIGRAM(S): 100 INJECTION SUBCUTANEOUS at 11:50

## 2020-02-18 RX ADMIN — METHOCARBAMOL 500 MILLIGRAM(S): 500 TABLET, FILM COATED ORAL at 18:23

## 2020-02-18 RX ADMIN — Medication 5 MILLIGRAM(S): at 21:40

## 2020-02-18 RX ADMIN — GABAPENTIN 300 MILLIGRAM(S): 400 CAPSULE ORAL at 21:40

## 2020-02-18 RX ADMIN — Medication 1 PATCH: at 11:49

## 2020-02-18 RX ADMIN — ATORVASTATIN CALCIUM 40 MILLIGRAM(S): 80 TABLET, FILM COATED ORAL at 21:40

## 2020-02-18 RX ADMIN — Medication 1 PATCH: at 14:45

## 2020-02-18 RX ADMIN — POLYETHYLENE GLYCOL 3350 17 GRAM(S): 17 POWDER, FOR SOLUTION ORAL at 11:48

## 2020-02-18 RX ADMIN — GABAPENTIN 200 MILLIGRAM(S): 400 CAPSULE ORAL at 06:08

## 2020-02-19 RX ORDER — BACITRACIN ZINC 500 UNIT/G
1 OINTMENT IN PACKET (EA) TOPICAL
Refills: 0 | Status: DISCONTINUED | OUTPATIENT
Start: 2020-02-19 | End: 2020-02-26

## 2020-02-19 RX ORDER — SODIUM CHLORIDE 9 MG/ML
1000 INJECTION INTRAMUSCULAR; INTRAVENOUS; SUBCUTANEOUS
Refills: 0 | Status: DISCONTINUED | OUTPATIENT
Start: 2020-02-19 | End: 2020-02-21

## 2020-02-19 RX ADMIN — Medication 650 MILLIGRAM(S): at 18:31

## 2020-02-19 RX ADMIN — GABAPENTIN 300 MILLIGRAM(S): 400 CAPSULE ORAL at 05:12

## 2020-02-19 RX ADMIN — METHOCARBAMOL 500 MILLIGRAM(S): 500 TABLET, FILM COATED ORAL at 12:09

## 2020-02-19 RX ADMIN — Medication 1 PATCH: at 12:10

## 2020-02-19 RX ADMIN — Medication 650 MILLIGRAM(S): at 14:00

## 2020-02-19 RX ADMIN — Medication 1 APPLICATION(S): at 17:34

## 2020-02-19 RX ADMIN — SODIUM CHLORIDE 100 MILLILITER(S): 9 INJECTION INTRAMUSCULAR; INTRAVENOUS; SUBCUTANEOUS at 18:30

## 2020-02-19 RX ADMIN — Medication 650 MILLIGRAM(S): at 14:12

## 2020-02-19 RX ADMIN — PANTOPRAZOLE SODIUM 40 MILLIGRAM(S): 20 TABLET, DELAYED RELEASE ORAL at 05:12

## 2020-02-19 RX ADMIN — ENOXAPARIN SODIUM 40 MILLIGRAM(S): 100 INJECTION SUBCUTANEOUS at 12:11

## 2020-02-19 RX ADMIN — Medication 650 MILLIGRAM(S): at 05:51

## 2020-02-19 RX ADMIN — Medication 650 MILLIGRAM(S): at 17:29

## 2020-02-19 RX ADMIN — METHOCARBAMOL 500 MILLIGRAM(S): 500 TABLET, FILM COATED ORAL at 23:12

## 2020-02-19 RX ADMIN — Medication 650 MILLIGRAM(S): at 05:12

## 2020-02-19 RX ADMIN — Medication 1 PATCH: at 06:32

## 2020-02-19 RX ADMIN — METHOCARBAMOL 500 MILLIGRAM(S): 500 TABLET, FILM COATED ORAL at 05:12

## 2020-02-19 RX ADMIN — Medication 1 PATCH: at 18:45

## 2020-02-19 RX ADMIN — METHOCARBAMOL 500 MILLIGRAM(S): 500 TABLET, FILM COATED ORAL at 17:29

## 2020-02-19 RX ADMIN — OXYCODONE HYDROCHLORIDE 5 MILLIGRAM(S): 5 TABLET ORAL at 21:10

## 2020-02-19 RX ADMIN — GABAPENTIN 300 MILLIGRAM(S): 400 CAPSULE ORAL at 21:10

## 2020-02-19 RX ADMIN — Medication 650 MILLIGRAM(S): at 12:09

## 2020-02-19 RX ADMIN — TAMSULOSIN HYDROCHLORIDE 0.4 MILLIGRAM(S): 0.4 CAPSULE ORAL at 21:10

## 2020-02-19 RX ADMIN — OXYCODONE HYDROCHLORIDE 5 MILLIGRAM(S): 5 TABLET ORAL at 21:59

## 2020-02-19 RX ADMIN — Medication 5 MILLIGRAM(S): at 21:10

## 2020-02-19 RX ADMIN — Medication 650 MILLIGRAM(S): at 23:12

## 2020-02-19 RX ADMIN — Medication 1 PATCH: at 12:05

## 2020-02-19 RX ADMIN — GABAPENTIN 300 MILLIGRAM(S): 400 CAPSULE ORAL at 16:08

## 2020-02-19 RX ADMIN — OXYCODONE HYDROCHLORIDE 5 MILLIGRAM(S): 5 TABLET ORAL at 18:45

## 2020-02-19 RX ADMIN — OXYCODONE HYDROCHLORIDE 5 MILLIGRAM(S): 5 TABLET ORAL at 16:14

## 2020-02-19 RX ADMIN — ATORVASTATIN CALCIUM 40 MILLIGRAM(S): 80 TABLET, FILM COATED ORAL at 21:10

## 2020-02-20 LAB
ALBUMIN SERPL ELPH-MCNC: 3.6 G/DL — SIGNIFICANT CHANGE UP (ref 3.5–5.2)
ALP SERPL-CCNC: 66 U/L — SIGNIFICANT CHANGE UP (ref 30–115)
ALT FLD-CCNC: 17 U/L — SIGNIFICANT CHANGE UP (ref 0–41)
ANION GAP SERPL CALC-SCNC: 13 MMOL/L — SIGNIFICANT CHANGE UP (ref 7–14)
AST SERPL-CCNC: 17 U/L — SIGNIFICANT CHANGE UP (ref 0–41)
BASOPHILS # BLD AUTO: 0.04 K/UL — SIGNIFICANT CHANGE UP (ref 0–0.2)
BASOPHILS NFR BLD AUTO: 0.6 % — SIGNIFICANT CHANGE UP (ref 0–1)
BILIRUB SERPL-MCNC: <0.2 MG/DL — SIGNIFICANT CHANGE UP (ref 0.2–1.2)
BUN SERPL-MCNC: 18 MG/DL — SIGNIFICANT CHANGE UP (ref 10–20)
CALCIUM SERPL-MCNC: 8.6 MG/DL — SIGNIFICANT CHANGE UP (ref 8.5–10.1)
CHLORIDE SERPL-SCNC: 105 MMOL/L — SIGNIFICANT CHANGE UP (ref 98–110)
CO2 SERPL-SCNC: 21 MMOL/L — SIGNIFICANT CHANGE UP (ref 17–32)
CREAT SERPL-MCNC: 0.8 MG/DL — SIGNIFICANT CHANGE UP (ref 0.7–1.5)
EOSINOPHIL # BLD AUTO: 0.27 K/UL — SIGNIFICANT CHANGE UP (ref 0–0.7)
EOSINOPHIL NFR BLD AUTO: 4.2 % — SIGNIFICANT CHANGE UP (ref 0–8)
GLUCOSE SERPL-MCNC: 88 MG/DL — SIGNIFICANT CHANGE UP (ref 70–99)
HCT VFR BLD CALC: 32.3 % — LOW (ref 42–52)
HGB BLD-MCNC: 10.1 G/DL — LOW (ref 14–18)
IMM GRANULOCYTES NFR BLD AUTO: 1.3 % — HIGH (ref 0.1–0.3)
LYMPHOCYTES # BLD AUTO: 2.32 K/UL — SIGNIFICANT CHANGE UP (ref 1.2–3.4)
LYMPHOCYTES # BLD AUTO: 36.3 % — SIGNIFICANT CHANGE UP (ref 20.5–51.1)
MAGNESIUM SERPL-MCNC: 1.9 MG/DL — SIGNIFICANT CHANGE UP (ref 1.8–2.4)
MCHC RBC-ENTMCNC: 24.9 PG — LOW (ref 27–31)
MCHC RBC-ENTMCNC: 31.3 G/DL — LOW (ref 32–37)
MCV RBC AUTO: 79.6 FL — LOW (ref 80–94)
MONOCYTES # BLD AUTO: 0.52 K/UL — SIGNIFICANT CHANGE UP (ref 0.1–0.6)
MONOCYTES NFR BLD AUTO: 8.1 % — SIGNIFICANT CHANGE UP (ref 1.7–9.3)
NEUTROPHILS # BLD AUTO: 3.17 K/UL — SIGNIFICANT CHANGE UP (ref 1.4–6.5)
NEUTROPHILS NFR BLD AUTO: 49.5 % — SIGNIFICANT CHANGE UP (ref 42.2–75.2)
NRBC # BLD: 0 /100 WBCS — SIGNIFICANT CHANGE UP (ref 0–0)
PLATELET # BLD AUTO: 323 K/UL — SIGNIFICANT CHANGE UP (ref 130–400)
POTASSIUM SERPL-MCNC: 4.6 MMOL/L — SIGNIFICANT CHANGE UP (ref 3.5–5)
POTASSIUM SERPL-SCNC: 4.6 MMOL/L — SIGNIFICANT CHANGE UP (ref 3.5–5)
PROT SERPL-MCNC: 6.3 G/DL — SIGNIFICANT CHANGE UP (ref 6–8)
RBC # BLD: 4.06 M/UL — LOW (ref 4.7–6.1)
RBC # FLD: 18.7 % — HIGH (ref 11.5–14.5)
SODIUM SERPL-SCNC: 139 MMOL/L — SIGNIFICANT CHANGE UP (ref 135–146)
WBC # BLD: 6.4 K/UL — SIGNIFICANT CHANGE UP (ref 4.8–10.8)
WBC # FLD AUTO: 6.4 K/UL — SIGNIFICANT CHANGE UP (ref 4.8–10.8)

## 2020-02-20 RX ADMIN — GABAPENTIN 300 MILLIGRAM(S): 400 CAPSULE ORAL at 05:28

## 2020-02-20 RX ADMIN — GABAPENTIN 300 MILLIGRAM(S): 400 CAPSULE ORAL at 21:44

## 2020-02-20 RX ADMIN — METHOCARBAMOL 500 MILLIGRAM(S): 500 TABLET, FILM COATED ORAL at 12:23

## 2020-02-20 RX ADMIN — METHOCARBAMOL 500 MILLIGRAM(S): 500 TABLET, FILM COATED ORAL at 17:54

## 2020-02-20 RX ADMIN — Medication 650 MILLIGRAM(S): at 17:54

## 2020-02-20 RX ADMIN — METHOCARBAMOL 500 MILLIGRAM(S): 500 TABLET, FILM COATED ORAL at 21:44

## 2020-02-20 RX ADMIN — METHOCARBAMOL 500 MILLIGRAM(S): 500 TABLET, FILM COATED ORAL at 05:29

## 2020-02-20 RX ADMIN — Medication 1 PATCH: at 06:14

## 2020-02-20 RX ADMIN — TAMSULOSIN HYDROCHLORIDE 0.4 MILLIGRAM(S): 0.4 CAPSULE ORAL at 21:44

## 2020-02-20 RX ADMIN — OXYCODONE HYDROCHLORIDE 5 MILLIGRAM(S): 5 TABLET ORAL at 21:43

## 2020-02-20 RX ADMIN — GABAPENTIN 300 MILLIGRAM(S): 400 CAPSULE ORAL at 14:23

## 2020-02-20 RX ADMIN — Medication 650 MILLIGRAM(S): at 12:23

## 2020-02-20 RX ADMIN — Medication 650 MILLIGRAM(S): at 06:15

## 2020-02-20 RX ADMIN — Medication 650 MILLIGRAM(S): at 14:22

## 2020-02-20 RX ADMIN — Medication 650 MILLIGRAM(S): at 21:44

## 2020-02-20 RX ADMIN — Medication 5 MILLIGRAM(S): at 21:43

## 2020-02-20 RX ADMIN — ENOXAPARIN SODIUM 40 MILLIGRAM(S): 100 INJECTION SUBCUTANEOUS at 12:24

## 2020-02-20 RX ADMIN — Medication 650 MILLIGRAM(S): at 21:47

## 2020-02-20 RX ADMIN — Medication 1 APPLICATION(S): at 05:29

## 2020-02-20 RX ADMIN — Medication 1 APPLICATION(S): at 17:54

## 2020-02-20 RX ADMIN — PANTOPRAZOLE SODIUM 40 MILLIGRAM(S): 20 TABLET, DELAYED RELEASE ORAL at 05:28

## 2020-02-20 RX ADMIN — Medication 1 PATCH: at 12:24

## 2020-02-20 RX ADMIN — Medication 650 MILLIGRAM(S): at 05:28

## 2020-02-20 RX ADMIN — SENNA PLUS 2 TABLET(S): 8.6 TABLET ORAL at 21:44

## 2020-02-20 RX ADMIN — Medication 1 PATCH: at 12:25

## 2020-02-20 RX ADMIN — Medication 650 MILLIGRAM(S): at 00:40

## 2020-02-20 RX ADMIN — ATORVASTATIN CALCIUM 40 MILLIGRAM(S): 80 TABLET, FILM COATED ORAL at 21:44

## 2020-02-21 RX ORDER — TRAMADOL HYDROCHLORIDE 50 MG/1
50 TABLET ORAL EVERY 4 HOURS
Refills: 0 | Status: DISCONTINUED | OUTPATIENT
Start: 2020-02-21 | End: 2020-02-26

## 2020-02-21 RX ORDER — GABAPENTIN 400 MG/1
400 CAPSULE ORAL EVERY 8 HOURS
Refills: 0 | Status: DISCONTINUED | OUTPATIENT
Start: 2020-02-21 | End: 2020-02-26

## 2020-02-21 RX ADMIN — POLYETHYLENE GLYCOL 3350 17 GRAM(S): 17 POWDER, FOR SOLUTION ORAL at 12:04

## 2020-02-21 RX ADMIN — METHOCARBAMOL 500 MILLIGRAM(S): 500 TABLET, FILM COATED ORAL at 21:48

## 2020-02-21 RX ADMIN — Medication 1 PATCH: at 12:03

## 2020-02-21 RX ADMIN — Medication 1 PATCH: at 12:00

## 2020-02-21 RX ADMIN — Medication 650 MILLIGRAM(S): at 06:21

## 2020-02-21 RX ADMIN — Medication 650 MILLIGRAM(S): at 06:22

## 2020-02-21 RX ADMIN — METHOCARBAMOL 500 MILLIGRAM(S): 500 TABLET, FILM COATED ORAL at 06:21

## 2020-02-21 RX ADMIN — Medication 650 MILLIGRAM(S): at 21:46

## 2020-02-21 RX ADMIN — GABAPENTIN 400 MILLIGRAM(S): 400 CAPSULE ORAL at 21:47

## 2020-02-21 RX ADMIN — ATORVASTATIN CALCIUM 40 MILLIGRAM(S): 80 TABLET, FILM COATED ORAL at 21:46

## 2020-02-21 RX ADMIN — Medication 650 MILLIGRAM(S): at 21:48

## 2020-02-21 RX ADMIN — ENOXAPARIN SODIUM 40 MILLIGRAM(S): 100 INJECTION SUBCUTANEOUS at 12:03

## 2020-02-21 RX ADMIN — METHOCARBAMOL 500 MILLIGRAM(S): 500 TABLET, FILM COATED ORAL at 12:05

## 2020-02-21 RX ADMIN — METHOCARBAMOL 500 MILLIGRAM(S): 500 TABLET, FILM COATED ORAL at 17:46

## 2020-02-21 RX ADMIN — Medication 650 MILLIGRAM(S): at 13:11

## 2020-02-21 RX ADMIN — Medication 1 APPLICATION(S): at 17:46

## 2020-02-21 RX ADMIN — GABAPENTIN 300 MILLIGRAM(S): 400 CAPSULE ORAL at 06:21

## 2020-02-21 RX ADMIN — TAMSULOSIN HYDROCHLORIDE 0.4 MILLIGRAM(S): 0.4 CAPSULE ORAL at 21:46

## 2020-02-21 RX ADMIN — Medication 5 MILLIGRAM(S): at 21:46

## 2020-02-21 RX ADMIN — Medication 1 APPLICATION(S): at 06:21

## 2020-02-21 RX ADMIN — Medication 650 MILLIGRAM(S): at 12:02

## 2020-02-21 RX ADMIN — Medication 1 PATCH: at 06:23

## 2020-02-21 RX ADMIN — Medication 650 MILLIGRAM(S): at 17:45

## 2020-02-21 RX ADMIN — PANTOPRAZOLE SODIUM 40 MILLIGRAM(S): 20 TABLET, DELAYED RELEASE ORAL at 06:21

## 2020-02-21 RX ADMIN — GABAPENTIN 300 MILLIGRAM(S): 400 CAPSULE ORAL at 13:11

## 2020-02-22 RX ORDER — TRAZODONE HCL 50 MG
100 TABLET ORAL AT BEDTIME
Refills: 0 | Status: DISCONTINUED | OUTPATIENT
Start: 2020-02-22 | End: 2020-02-26

## 2020-02-22 RX ADMIN — METHOCARBAMOL 500 MILLIGRAM(S): 500 TABLET, FILM COATED ORAL at 06:24

## 2020-02-22 RX ADMIN — Medication 1 PATCH: at 06:26

## 2020-02-22 RX ADMIN — ATORVASTATIN CALCIUM 40 MILLIGRAM(S): 80 TABLET, FILM COATED ORAL at 21:23

## 2020-02-22 RX ADMIN — Medication 5 MILLIGRAM(S): at 23:03

## 2020-02-22 RX ADMIN — Medication 650 MILLIGRAM(S): at 06:26

## 2020-02-22 RX ADMIN — ENOXAPARIN SODIUM 40 MILLIGRAM(S): 100 INJECTION SUBCUTANEOUS at 13:06

## 2020-02-22 RX ADMIN — Medication 650 MILLIGRAM(S): at 17:34

## 2020-02-22 RX ADMIN — Medication 650 MILLIGRAM(S): at 13:02

## 2020-02-22 RX ADMIN — Medication 1 APPLICATION(S): at 17:35

## 2020-02-22 RX ADMIN — Medication 1 PATCH: at 13:03

## 2020-02-22 RX ADMIN — GABAPENTIN 400 MILLIGRAM(S): 400 CAPSULE ORAL at 21:22

## 2020-02-22 RX ADMIN — PANTOPRAZOLE SODIUM 40 MILLIGRAM(S): 20 TABLET, DELAYED RELEASE ORAL at 06:20

## 2020-02-22 RX ADMIN — Medication 650 MILLIGRAM(S): at 06:20

## 2020-02-22 RX ADMIN — GABAPENTIN 400 MILLIGRAM(S): 400 CAPSULE ORAL at 13:03

## 2020-02-22 RX ADMIN — Medication 650 MILLIGRAM(S): at 13:30

## 2020-02-22 RX ADMIN — Medication 1 PATCH: at 18:55

## 2020-02-22 RX ADMIN — METHOCARBAMOL 500 MILLIGRAM(S): 500 TABLET, FILM COATED ORAL at 17:33

## 2020-02-22 RX ADMIN — METHOCARBAMOL 500 MILLIGRAM(S): 500 TABLET, FILM COATED ORAL at 23:03

## 2020-02-22 RX ADMIN — GABAPENTIN 400 MILLIGRAM(S): 400 CAPSULE ORAL at 06:20

## 2020-02-22 RX ADMIN — TAMSULOSIN HYDROCHLORIDE 0.4 MILLIGRAM(S): 0.4 CAPSULE ORAL at 21:22

## 2020-02-22 RX ADMIN — Medication 650 MILLIGRAM(S): at 23:03

## 2020-02-22 RX ADMIN — METHOCARBAMOL 500 MILLIGRAM(S): 500 TABLET, FILM COATED ORAL at 13:03

## 2020-02-22 RX ADMIN — Medication 1 PATCH: at 01:12

## 2020-02-22 RX ADMIN — Medication 1 APPLICATION(S): at 06:20

## 2020-02-22 RX ADMIN — Medication 650 MILLIGRAM(S): at 18:55

## 2020-02-23 LAB — GLUCOSE BLDC GLUCOMTR-MCNC: 163 MG/DL — HIGH (ref 70–99)

## 2020-02-23 RX ADMIN — Medication 1 PATCH: at 06:18

## 2020-02-23 RX ADMIN — GABAPENTIN 400 MILLIGRAM(S): 400 CAPSULE ORAL at 05:12

## 2020-02-23 RX ADMIN — Medication 650 MILLIGRAM(S): at 11:55

## 2020-02-23 RX ADMIN — ENOXAPARIN SODIUM 40 MILLIGRAM(S): 100 INJECTION SUBCUTANEOUS at 11:57

## 2020-02-23 RX ADMIN — Medication 650 MILLIGRAM(S): at 12:03

## 2020-02-23 RX ADMIN — Medication 650 MILLIGRAM(S): at 23:01

## 2020-02-23 RX ADMIN — Medication 650 MILLIGRAM(S): at 00:34

## 2020-02-23 RX ADMIN — METHOCARBAMOL 500 MILLIGRAM(S): 500 TABLET, FILM COATED ORAL at 17:06

## 2020-02-23 RX ADMIN — ATORVASTATIN CALCIUM 40 MILLIGRAM(S): 80 TABLET, FILM COATED ORAL at 21:31

## 2020-02-23 RX ADMIN — Medication 650 MILLIGRAM(S): at 17:30

## 2020-02-23 RX ADMIN — Medication 1 APPLICATION(S): at 17:11

## 2020-02-23 RX ADMIN — METHOCARBAMOL 500 MILLIGRAM(S): 500 TABLET, FILM COATED ORAL at 23:01

## 2020-02-23 RX ADMIN — Medication 5 MILLIGRAM(S): at 23:01

## 2020-02-23 RX ADMIN — Medication 1 PATCH: at 12:03

## 2020-02-23 RX ADMIN — METHOCARBAMOL 500 MILLIGRAM(S): 500 TABLET, FILM COATED ORAL at 05:13

## 2020-02-23 RX ADMIN — Medication 1 PATCH: at 19:03

## 2020-02-23 RX ADMIN — Medication 1 APPLICATION(S): at 05:10

## 2020-02-23 RX ADMIN — GABAPENTIN 400 MILLIGRAM(S): 400 CAPSULE ORAL at 21:31

## 2020-02-23 RX ADMIN — Medication 650 MILLIGRAM(S): at 06:18

## 2020-02-23 RX ADMIN — METHOCARBAMOL 500 MILLIGRAM(S): 500 TABLET, FILM COATED ORAL at 11:55

## 2020-02-23 RX ADMIN — PANTOPRAZOLE SODIUM 40 MILLIGRAM(S): 20 TABLET, DELAYED RELEASE ORAL at 05:13

## 2020-02-23 RX ADMIN — Medication 650 MILLIGRAM(S): at 17:06

## 2020-02-23 RX ADMIN — Medication 650 MILLIGRAM(S): at 05:13

## 2020-02-23 RX ADMIN — GABAPENTIN 400 MILLIGRAM(S): 400 CAPSULE ORAL at 12:03

## 2020-02-23 RX ADMIN — Medication 1 PATCH: at 11:55

## 2020-02-23 RX ADMIN — TAMSULOSIN HYDROCHLORIDE 0.4 MILLIGRAM(S): 0.4 CAPSULE ORAL at 21:31

## 2020-02-24 LAB
BASOPHILS # BLD AUTO: 0.03 K/UL — SIGNIFICANT CHANGE UP (ref 0–0.2)
BASOPHILS NFR BLD AUTO: 0.4 % — SIGNIFICANT CHANGE UP (ref 0–1)
EOSINOPHIL # BLD AUTO: 0.36 K/UL — SIGNIFICANT CHANGE UP (ref 0–0.7)
EOSINOPHIL NFR BLD AUTO: 5 % — SIGNIFICANT CHANGE UP (ref 0–8)
HCT VFR BLD CALC: 32.1 % — LOW (ref 42–52)
HGB BLD-MCNC: 9.7 G/DL — LOW (ref 14–18)
IMM GRANULOCYTES NFR BLD AUTO: 0.6 % — HIGH (ref 0.1–0.3)
LYMPHOCYTES # BLD AUTO: 2.45 K/UL — SIGNIFICANT CHANGE UP (ref 1.2–3.4)
LYMPHOCYTES # BLD AUTO: 34.3 % — SIGNIFICANT CHANGE UP (ref 20.5–51.1)
MAGNESIUM SERPL-MCNC: 1.9 MG/DL — SIGNIFICANT CHANGE UP (ref 1.8–2.4)
MCHC RBC-ENTMCNC: 24.1 PG — LOW (ref 27–31)
MCHC RBC-ENTMCNC: 30.2 G/DL — LOW (ref 32–37)
MCV RBC AUTO: 79.7 FL — LOW (ref 80–94)
MONOCYTES # BLD AUTO: 0.52 K/UL — SIGNIFICANT CHANGE UP (ref 0.1–0.6)
MONOCYTES NFR BLD AUTO: 7.3 % — SIGNIFICANT CHANGE UP (ref 1.7–9.3)
NEUTROPHILS # BLD AUTO: 3.75 K/UL — SIGNIFICANT CHANGE UP (ref 1.4–6.5)
NEUTROPHILS NFR BLD AUTO: 52.4 % — SIGNIFICANT CHANGE UP (ref 42.2–75.2)
NRBC # BLD: 0 /100 WBCS — SIGNIFICANT CHANGE UP (ref 0–0)
PLATELET # BLD AUTO: 363 K/UL — SIGNIFICANT CHANGE UP (ref 130–400)
RBC # BLD: 4.03 M/UL — LOW (ref 4.7–6.1)
RBC # FLD: 19.5 % — HIGH (ref 11.5–14.5)
WBC # BLD: 7.15 K/UL — SIGNIFICANT CHANGE UP (ref 4.8–10.8)
WBC # FLD AUTO: 7.15 K/UL — SIGNIFICANT CHANGE UP (ref 4.8–10.8)

## 2020-02-24 RX ADMIN — TAMSULOSIN HYDROCHLORIDE 0.4 MILLIGRAM(S): 0.4 CAPSULE ORAL at 21:30

## 2020-02-24 RX ADMIN — METHOCARBAMOL 500 MILLIGRAM(S): 500 TABLET, FILM COATED ORAL at 12:01

## 2020-02-24 RX ADMIN — ATORVASTATIN CALCIUM 40 MILLIGRAM(S): 80 TABLET, FILM COATED ORAL at 21:30

## 2020-02-24 RX ADMIN — GABAPENTIN 400 MILLIGRAM(S): 400 CAPSULE ORAL at 14:22

## 2020-02-24 RX ADMIN — Medication 1 PATCH: at 12:01

## 2020-02-24 RX ADMIN — Medication 650 MILLIGRAM(S): at 06:28

## 2020-02-24 RX ADMIN — Medication 650 MILLIGRAM(S): at 12:30

## 2020-02-24 RX ADMIN — Medication 1 APPLICATION(S): at 17:01

## 2020-02-24 RX ADMIN — Medication 1 APPLICATION(S): at 05:22

## 2020-02-24 RX ADMIN — GABAPENTIN 400 MILLIGRAM(S): 400 CAPSULE ORAL at 05:27

## 2020-02-24 RX ADMIN — Medication 650 MILLIGRAM(S): at 12:01

## 2020-02-24 RX ADMIN — Medication 650 MILLIGRAM(S): at 23:59

## 2020-02-24 RX ADMIN — Medication 650 MILLIGRAM(S): at 00:37

## 2020-02-24 RX ADMIN — PANTOPRAZOLE SODIUM 40 MILLIGRAM(S): 20 TABLET, DELAYED RELEASE ORAL at 05:28

## 2020-02-24 RX ADMIN — METHOCARBAMOL 500 MILLIGRAM(S): 500 TABLET, FILM COATED ORAL at 17:01

## 2020-02-24 RX ADMIN — Medication 5 MILLIGRAM(S): at 21:31

## 2020-02-24 RX ADMIN — Medication 650 MILLIGRAM(S): at 17:01

## 2020-02-24 RX ADMIN — GABAPENTIN 400 MILLIGRAM(S): 400 CAPSULE ORAL at 21:31

## 2020-02-24 RX ADMIN — METHOCARBAMOL 500 MILLIGRAM(S): 500 TABLET, FILM COATED ORAL at 23:59

## 2020-02-24 RX ADMIN — Medication 650 MILLIGRAM(S): at 05:27

## 2020-02-24 RX ADMIN — TRAMADOL HYDROCHLORIDE 50 MILLIGRAM(S): 50 TABLET ORAL at 21:37

## 2020-02-24 RX ADMIN — Medication 1 PATCH: at 12:03

## 2020-02-24 RX ADMIN — METHOCARBAMOL 500 MILLIGRAM(S): 500 TABLET, FILM COATED ORAL at 05:27

## 2020-02-24 RX ADMIN — Medication 1 PATCH: at 06:28

## 2020-02-24 RX ADMIN — ENOXAPARIN SODIUM 40 MILLIGRAM(S): 100 INJECTION SUBCUTANEOUS at 12:01

## 2020-02-24 RX ADMIN — Medication 1 PATCH: at 20:16

## 2020-02-24 RX ADMIN — Medication 650 MILLIGRAM(S): at 17:58

## 2020-02-25 DIAGNOSIS — N18.2 CHRONIC KIDNEY DISEASE, STAGE 2 (MILD): ICD-10-CM

## 2020-02-25 DIAGNOSIS — F17.210 NICOTINE DEPENDENCE, CIGARETTES, UNCOMPLICATED: ICD-10-CM

## 2020-02-25 DIAGNOSIS — E86.0 DEHYDRATION: ICD-10-CM

## 2020-02-25 DIAGNOSIS — M48.061 SPINAL STENOSIS, LUMBAR REGION WITHOUT NEUROGENIC CLAUDICATION: ICD-10-CM

## 2020-02-25 DIAGNOSIS — D50.9 IRON DEFICIENCY ANEMIA, UNSPECIFIED: ICD-10-CM

## 2020-02-25 DIAGNOSIS — I71.4 ABDOMINAL AORTIC ANEURYSM, WITHOUT RUPTURE: ICD-10-CM

## 2020-02-25 DIAGNOSIS — R33.9 RETENTION OF URINE, UNSPECIFIED: ICD-10-CM

## 2020-02-25 DIAGNOSIS — K21.9 GASTRO-ESOPHAGEAL REFLUX DISEASE WITHOUT ESOPHAGITIS: ICD-10-CM

## 2020-02-25 DIAGNOSIS — M51.16 INTERVERTEBRAL DISC DISORDERS WITH RADICULOPATHY, LUMBAR REGION: ICD-10-CM

## 2020-02-25 DIAGNOSIS — I12.9 HYPERTENSIVE CHRONIC KIDNEY DISEASE WITH STAGE 1 THROUGH STAGE 4 CHRONIC KIDNEY DISEASE, OR UNSPECIFIED CHRONIC KIDNEY DISEASE: ICD-10-CM

## 2020-02-25 DIAGNOSIS — E87.5 HYPERKALEMIA: ICD-10-CM

## 2020-02-25 DIAGNOSIS — E78.5 HYPERLIPIDEMIA, UNSPECIFIED: ICD-10-CM

## 2020-02-25 RX ADMIN — Medication 1 APPLICATION(S): at 17:56

## 2020-02-25 RX ADMIN — Medication 1 PATCH: at 19:43

## 2020-02-25 RX ADMIN — TRAMADOL HYDROCHLORIDE 50 MILLIGRAM(S): 50 TABLET ORAL at 00:00

## 2020-02-25 RX ADMIN — GABAPENTIN 400 MILLIGRAM(S): 400 CAPSULE ORAL at 14:01

## 2020-02-25 RX ADMIN — Medication 650 MILLIGRAM(S): at 17:57

## 2020-02-25 RX ADMIN — PANTOPRAZOLE SODIUM 40 MILLIGRAM(S): 20 TABLET, DELAYED RELEASE ORAL at 05:46

## 2020-02-25 RX ADMIN — METHOCARBAMOL 500 MILLIGRAM(S): 500 TABLET, FILM COATED ORAL at 05:45

## 2020-02-25 RX ADMIN — Medication 650 MILLIGRAM(S): at 12:00

## 2020-02-25 RX ADMIN — TAMSULOSIN HYDROCHLORIDE 0.4 MILLIGRAM(S): 0.4 CAPSULE ORAL at 21:23

## 2020-02-25 RX ADMIN — GABAPENTIN 400 MILLIGRAM(S): 400 CAPSULE ORAL at 05:44

## 2020-02-25 RX ADMIN — Medication 1 PATCH: at 11:56

## 2020-02-25 RX ADMIN — Medication 650 MILLIGRAM(S): at 05:44

## 2020-02-25 RX ADMIN — Medication 1 APPLICATION(S): at 05:44

## 2020-02-25 RX ADMIN — Medication 650 MILLIGRAM(S): at 11:58

## 2020-02-25 RX ADMIN — METHOCARBAMOL 500 MILLIGRAM(S): 500 TABLET, FILM COATED ORAL at 11:59

## 2020-02-25 RX ADMIN — Medication 650 MILLIGRAM(S): at 17:56

## 2020-02-25 RX ADMIN — GABAPENTIN 400 MILLIGRAM(S): 400 CAPSULE ORAL at 21:23

## 2020-02-25 RX ADMIN — Medication 1 PATCH: at 12:02

## 2020-02-25 RX ADMIN — Medication 1 PATCH: at 07:03

## 2020-02-25 RX ADMIN — ENOXAPARIN SODIUM 40 MILLIGRAM(S): 100 INJECTION SUBCUTANEOUS at 11:59

## 2020-02-25 RX ADMIN — ATORVASTATIN CALCIUM 40 MILLIGRAM(S): 80 TABLET, FILM COATED ORAL at 21:23

## 2020-02-25 RX ADMIN — Medication 650 MILLIGRAM(S): at 01:52

## 2020-02-25 RX ADMIN — Medication 650 MILLIGRAM(S): at 07:01

## 2020-02-25 RX ADMIN — METHOCARBAMOL 500 MILLIGRAM(S): 500 TABLET, FILM COATED ORAL at 17:56

## 2020-02-25 RX ADMIN — Medication 5 MILLIGRAM(S): at 21:23

## 2020-02-26 ENCOUNTER — TRANSCRIPTION ENCOUNTER (OUTPATIENT)
Age: 75
End: 2020-02-26

## 2020-02-26 RX ORDER — METHOCARBAMOL 500 MG/1
1 TABLET, FILM COATED ORAL
Qty: 120 | Refills: 0
Start: 2020-02-26 | End: 2020-03-26

## 2020-02-26 RX ORDER — GABAPENTIN 400 MG/1
1 CAPSULE ORAL
Qty: 90 | Refills: 0
Start: 2020-02-26 | End: 2020-03-26

## 2020-02-26 RX ORDER — NICOTINE POLACRILEX 2 MG
1 GUM BUCCAL
Qty: 30 | Refills: 0
Start: 2020-02-26 | End: 2020-03-26

## 2020-02-26 RX ORDER — TRAMADOL HYDROCHLORIDE 50 MG/1
1 TABLET ORAL
Qty: 21 | Refills: 0
Start: 2020-02-26 | End: 2020-03-03

## 2020-02-26 RX ORDER — TAMSULOSIN HYDROCHLORIDE 0.4 MG/1
1 CAPSULE ORAL
Qty: 30 | Refills: 0
Start: 2020-02-26 | End: 2020-03-26

## 2020-02-26 RX ORDER — TRAZODONE HCL 50 MG
1 TABLET ORAL
Qty: 5 | Refills: 0
Start: 2020-02-26 | End: 2020-03-01

## 2020-02-26 RX ORDER — ATORVASTATIN CALCIUM 80 MG/1
1 TABLET, FILM COATED ORAL
Qty: 30 | Refills: 0
Start: 2020-02-26 | End: 2020-03-26

## 2020-02-26 RX ADMIN — Medication 650 MILLIGRAM(S): at 11:39

## 2020-02-26 RX ADMIN — Medication 650 MILLIGRAM(S): at 05:44

## 2020-02-26 RX ADMIN — Medication 1 PATCH: at 11:40

## 2020-02-26 RX ADMIN — Medication 650 MILLIGRAM(S): at 17:44

## 2020-02-26 RX ADMIN — Medication 1 APPLICATION(S): at 05:42

## 2020-02-26 RX ADMIN — Medication 1 APPLICATION(S): at 17:44

## 2020-02-26 RX ADMIN — Medication 650 MILLIGRAM(S): at 17:43

## 2020-02-26 RX ADMIN — METHOCARBAMOL 500 MILLIGRAM(S): 500 TABLET, FILM COATED ORAL at 05:41

## 2020-02-26 RX ADMIN — ENOXAPARIN SODIUM 40 MILLIGRAM(S): 100 INJECTION SUBCUTANEOUS at 11:39

## 2020-02-26 RX ADMIN — PANTOPRAZOLE SODIUM 40 MILLIGRAM(S): 20 TABLET, DELAYED RELEASE ORAL at 06:06

## 2020-02-26 RX ADMIN — Medication 650 MILLIGRAM(S): at 11:42

## 2020-02-26 RX ADMIN — Medication 1 PATCH: at 11:42

## 2020-02-26 RX ADMIN — GABAPENTIN 400 MILLIGRAM(S): 400 CAPSULE ORAL at 05:41

## 2020-02-26 RX ADMIN — GABAPENTIN 400 MILLIGRAM(S): 400 CAPSULE ORAL at 13:14

## 2020-02-26 RX ADMIN — METHOCARBAMOL 500 MILLIGRAM(S): 500 TABLET, FILM COATED ORAL at 17:43

## 2020-02-26 RX ADMIN — METHOCARBAMOL 500 MILLIGRAM(S): 500 TABLET, FILM COATED ORAL at 11:40

## 2020-02-26 RX ADMIN — Medication 1 PATCH: at 07:48

## 2020-02-26 RX ADMIN — Medication 650 MILLIGRAM(S): at 00:15

## 2020-02-26 RX ADMIN — METHOCARBAMOL 500 MILLIGRAM(S): 500 TABLET, FILM COATED ORAL at 05:42

## 2020-02-26 NOTE — DISCHARGE NOTE PROVIDER - INSTRUCTIONS
heart healthy , low fat low cholesterol , do not restrict sodium now  since Blood pressure s are low side

## 2020-02-26 NOTE — DISCHARGE NOTE PROVIDER - NSDCCPCAREPLAN_GEN_ALL_CORE_FT
PRINCIPAL DISCHARGE DIAGNOSIS  Diagnosis: Spinal stenosis  Assessment and Plan of Treatment: of lumbar spine . you had surgery decompression laminectomy by dr Blackman , incision healed well , please follow up with dr Blackman in 2 weeks , report any worsening of condition  to your doctor .      SECONDARY DISCHARGE DIAGNOSES  Diagnosis: Hypertension  Assessment and Plan of Treatment: now blood pressures are stable , off medication ,please check your blood opressure   daily and keep a record  and take it to your medical doctor , get advise on resuming meds if needed    Diagnosis: Iron deficiency anemia due to chronic blood loss  Assessment and Plan of Treatment: need to follow up with Gi doctor for possible egd or colonoscopy  as an out patient    Diagnosis: Neuropathy  Assessment and Plan of Treatment: continue gabapentin and other pain meds PRINCIPAL DISCHARGE DIAGNOSIS  Diagnosis: Spinal stenosis  Assessment and Plan of Treatment: of lumbar spine . you had surgery decompression laminectomy by dr Blackman , incision healed well , please follow up with dr Blackman in 2 weeks , report any worsening of condition  to your doctor .      SECONDARY DISCHARGE DIAGNOSES  Diagnosis: Hypertension  Assessment and Plan of Treatment: now blood pressures are stable , off medication ,please check your blood opressure   daily and keep a record  and take it to your medical doctor , get advise on resuming meds if needed    Diagnosis: Iron deficiency anemia due to chronic blood loss  Assessment and Plan of Treatment: need to follow up with Gi doctor, you had egd and colonossopy  4 months ago ?  dana taylor with him in 2 to 3 weeks as an out patient    Diagnosis: Neuropathy  Assessment and Plan of Treatment: continue gabapentin and other pain meds

## 2020-02-26 NOTE — DISCHARGE NOTE PROVIDER - PROVIDER TOKENS
PROVIDER:[TOKEN:[[id number]]],PROVIDER:[TOKEN:[[id number]]],PROVIDER:[TOKEN:[[id number]]]

## 2020-02-26 NOTE — DISCHARGE NOTE PROVIDER - HOSPITAL COURSE
History of Present Illness goes back to the past month when the patient started having back pain that has been chronic but with recent worsening. He was seeing neurology, rehab and pain management with little improvement. This morning the pain was so bad upon walking that he called EMS and presented to the ED. . He denies numbness, weakness, saddle anesthesia, urinary retention of fecal incontinence.        his MRI showed l2-3 disc herniation , spinal stenosis and b/; foraminal stenosis at multilevels.           He underwent discectomy by neurosx,  pain improved  after surgery , he was  ambulating. and  was stable  and was  discharged to   as physiatrist deemed him to ba a candidate for acute rehab. .  He did have post op low blood pressure treated with hydration , also urine retention 1 liter , started flomax  this admission , in rehab 2/19 continued  to have hypotension , lisinopril and HCTZ discontinued   .  he is better after stopping blood pressure pills ,also  encouraged hydration .   He also had post-op urine retention - straight catheterization done and and got out 1 L on 2/12    cont flomax 0.4mg po qhs, now he passe dvoiding trial . he needs to follow up with a urologist if that occurs again .  His Neuro -doctor is  Dr Wagner , neurosurgeon  dr evans , his back incision lokds good , healing well , needs to follow up with her in 2 weeks and pmd  dr Villalba in 2  weeks . He also found to have  Microcytic Anemia 2/2 DELLA - unsure of cause (? from asa use)    no evidence of bleeding or bruising     will need outpt GI eval for scopes and eval for H pylori    cont iron supplementation     hold asa

## 2020-02-26 NOTE — DISCHARGE NOTE PROVIDER - CARE PROVIDERS DIRECT ADDRESSES
[email or web address] [email or web address]

## 2020-02-26 NOTE — DISCHARGE NOTE PROVIDER - CARE PROVIDER_API CALL
Jadon Villalba)  Internal Medicine  [address] [place] [address]  Phone: [phone]  Fax: [phone]  Follow Up Time:     Sunitha Blackman)  Surgery  [address] [place] [address]  Phone: [phone]  Fax: [phone]  Follow Up Time:     Jake Junior)  Urology  [address] [facility], [place] [address]  Phone: [phone]  Fax: [phone]  Follow Up Time:

## 2020-03-03 DIAGNOSIS — G89.29 OTHER CHRONIC PAIN: ICD-10-CM

## 2020-03-03 DIAGNOSIS — I10 ESSENTIAL (PRIMARY) HYPERTENSION: ICD-10-CM

## 2020-03-03 DIAGNOSIS — Z98.890 OTHER SPECIFIED POSTPROCEDURAL STATES: ICD-10-CM

## 2020-03-03 DIAGNOSIS — Z47.89 ENCOUNTER FOR OTHER ORTHOPEDIC AFTERCARE: ICD-10-CM

## 2020-03-03 DIAGNOSIS — E78.5 HYPERLIPIDEMIA, UNSPECIFIED: ICD-10-CM

## 2020-03-03 DIAGNOSIS — I95.9 HYPOTENSION, UNSPECIFIED: ICD-10-CM

## 2020-03-03 DIAGNOSIS — F17.210 NICOTINE DEPENDENCE, CIGARETTES, UNCOMPLICATED: ICD-10-CM

## 2020-03-03 DIAGNOSIS — D50.0 IRON DEFICIENCY ANEMIA SECONDARY TO BLOOD LOSS (CHRONIC): ICD-10-CM

## 2020-03-03 DIAGNOSIS — M51.36 OTHER INTERVERTEBRAL DISC DEGENERATION, LUMBAR REGION: ICD-10-CM

## 2020-03-03 DIAGNOSIS — G62.9 POLYNEUROPATHY, UNSPECIFIED: ICD-10-CM

## 2020-03-03 DIAGNOSIS — K21.9 GASTRO-ESOPHAGEAL REFLUX DISEASE WITHOUT ESOPHAGITIS: ICD-10-CM

## 2020-03-03 DIAGNOSIS — I71.4 ABDOMINAL AORTIC ANEURYSM, WITHOUT RUPTURE: ICD-10-CM

## 2020-03-03 DIAGNOSIS — E86.0 DEHYDRATION: ICD-10-CM

## 2020-05-05 ENCOUNTER — APPOINTMENT (OUTPATIENT)
Dept: VASCULAR SURGERY | Facility: CLINIC | Age: 75
End: 2020-05-05

## 2020-05-22 ENCOUNTER — APPOINTMENT (OUTPATIENT)
Dept: VASCULAR SURGERY | Facility: CLINIC | Age: 75
End: 2020-05-22
Payer: COMMERCIAL

## 2020-05-22 VITALS — BODY MASS INDEX: 29.65 KG/M2 | DIASTOLIC BLOOD PRESSURE: 74 MMHG | SYSTOLIC BLOOD PRESSURE: 128 MMHG | WEIGHT: 195 LBS

## 2020-05-22 PROCEDURE — 93978 VASCULAR STUDY: CPT

## 2020-05-22 PROCEDURE — 99213 OFFICE O/P EST LOW 20 MIN: CPT

## 2020-05-26 NOTE — ASSESSMENT
[FreeTextEntry1] : 75 y/o gentleman with AAA presents for routine follow up, denies any complaints. I performed an arterial duplex which was medically necessary to evaluate for increase in size of the AAA. It showed infrarenal AAA at 4.3 cm. It is not at a size requiring any surgical intervention at this time.\par I have informed him of the test results and advised follow up in six months  with Dr. Sosa for repeat aortic duplex.

## 2020-09-23 ENCOUNTER — APPOINTMENT (OUTPATIENT)
Dept: VASCULAR SURGERY | Facility: CLINIC | Age: 75
End: 2020-09-23

## 2020-09-29 ENCOUNTER — APPOINTMENT (OUTPATIENT)
Dept: VASCULAR SURGERY | Facility: CLINIC | Age: 75
End: 2020-09-29
Payer: COMMERCIAL

## 2020-09-29 PROCEDURE — 93978 VASCULAR STUDY: CPT

## 2020-09-29 PROCEDURE — 99213 OFFICE O/P EST LOW 20 MIN: CPT

## 2020-09-29 NOTE — ASSESSMENT
[FreeTextEntry1] : 73 y/o gentleman with AAA presents for routine follow up, denies any complaints. I performed an arterial duplex which was medically necessary to evaluate for increase in size of the AAA. It showed infrarenal AAA at 4.3 cm. It is not at a size requiring any surgical intervention at this time.\par I have informed him of the test results and advised follow up in six months  with Dr. Sosa for repeat aortic duplex.

## 2020-11-03 ENCOUNTER — APPOINTMENT (OUTPATIENT)
Dept: HEMATOLOGY ONCOLOGY | Facility: CLINIC | Age: 75
End: 2020-11-03
Payer: COMMERCIAL

## 2020-11-03 VITALS
SYSTOLIC BLOOD PRESSURE: 154 MMHG | TEMPERATURE: 97.3 F | BODY MASS INDEX: 30.46 KG/M2 | HEIGHT: 68 IN | WEIGHT: 201 LBS | DIASTOLIC BLOOD PRESSURE: 92 MMHG

## 2020-11-03 DIAGNOSIS — F17.200 NICOTINE DEPENDENCE, UNSPECIFIED, UNCOMPLICATED: ICD-10-CM

## 2020-11-03 DIAGNOSIS — Z86.2 PERSONAL HISTORY OF DISEASES OF THE BLOOD AND BLOOD-FORMING ORGANS AND CERTAIN DISORDERS INVOLVING THE IMMUNE MECHANISM: ICD-10-CM

## 2020-11-03 PROCEDURE — 99204 OFFICE O/P NEW MOD 45 MIN: CPT

## 2020-11-03 NOTE — ASSESSMENT
[FreeTextEntry1] : Patient is a 76 yo M with PMHx AAA undergoing observation with vascular surgery presents to clinic for evaluation of anemia. Review of blood work shows that patient has a history of progressive anemia. Hemoglobin in 2016 was 13.9 with MCV of 86.9. Since then, he has had gradual onset of microcytic anemia with progressively decreasing hemoglobin levels (starting from October 2019). Most recent blood work done 1 week ago shows a Hb of 9.9, HCt 34.3, and MCV 78.3. Iron studies were done in February 2020 and showed Iron 24, TIBC 423, and Ferritin 11\par \par Will repeat CBC, BMP, LFTs, Iron studies with Ferritin \par Recommended that he start IV iron therapy with Venofer x 5 doses\par He will RTC in 3 months to reassess his anemia\par Advied to see GI; Pt refused and  will follow up with Dr. Castro DOMINGUEZ

## 2020-11-03 NOTE — REVIEW OF SYSTEMS
[Cough] : cough [Fever] : no fever [Chills] : no chills [Night Sweats] : no night sweats [Fatigue] : no fatigue [Eye Pain] : no eye pain [Dysphagia] : no dysphagia [Chest Pain] : no chest pain [Palpitations] : no palpitations [Lower Ext Edema] : no lower extremity edema [Shortness Of Breath] : no shortness of breath [Wheezing] : no wheezing [Abdominal Pain] : no abdominal pain [Vomiting] : no vomiting [Constipation] : no constipation [Diarrhea] : no diarrhea [Dysuria] : no dysuria [Joint Pain] : no joint pain [Muscle Pain] : no muscle pain [Skin Rash] : no skin rash

## 2020-11-03 NOTE — HISTORY OF PRESENT ILLNESS
[de-identified] : Patient is a 76 yo M with PMHx AAA undergoing observation with vascular surgery presents to clinic for evaluation of anemia. Review of blood work shows that patient has a history of progressive anemia. Hemoglobin in 2016 was 13.9 with MCV of 86.9. Since then, he has had gradual onset of microcytic anemia with progressively decreasing hemoglobin levels (starting from October 2019). Most recent blood work done 1 week ago shows a Hb of 9.9, HCt 34.3, and MCV 78.3. Iron studies were done in February 2020 and showed Iron 24, TIBC 423, and Ferritin 11. Patient otherwise feels well. He denies any fatigue, shortness of breath, chest pain, abdominal pain, or blood in his stools. His last colonoscopy and endoscopy was 1 year ago with Dr. Erazo and reportedly was normal. He says he was told he could follow in 5 years for possible re-scoping. He continues to work and is fully functional at baseline.

## 2020-11-05 ENCOUNTER — APPOINTMENT (OUTPATIENT)
Dept: INFUSION THERAPY | Facility: CLINIC | Age: 75
End: 2020-11-05

## 2020-11-05 RX ORDER — IRON SUCROSE 20 MG/ML
200 INJECTION, SOLUTION INTRAVENOUS ONCE
Refills: 0 | Status: COMPLETED | OUTPATIENT
Start: 2020-11-05 | End: 2020-11-05

## 2020-11-05 RX ADMIN — IRON SUCROSE 200 MILLIGRAM(S): 20 INJECTION, SOLUTION INTRAVENOUS at 17:10

## 2020-11-05 RX ADMIN — IRON SUCROSE 220 MILLIGRAM(S): 20 INJECTION, SOLUTION INTRAVENOUS at 16:39

## 2020-11-12 ENCOUNTER — APPOINTMENT (OUTPATIENT)
Dept: INFUSION THERAPY | Facility: CLINIC | Age: 75
End: 2020-11-12

## 2020-11-12 RX ORDER — IRON SUCROSE 20 MG/ML
200 INJECTION, SOLUTION INTRAVENOUS ONCE
Refills: 0 | Status: COMPLETED | OUTPATIENT
Start: 2020-11-12 | End: 2020-11-12

## 2020-11-12 RX ADMIN — IRON SUCROSE 200 MILLIGRAM(S): 20 INJECTION, SOLUTION INTRAVENOUS at 17:30

## 2020-11-12 RX ADMIN — IRON SUCROSE 220 MILLIGRAM(S): 20 INJECTION, SOLUTION INTRAVENOUS at 16:58

## 2020-11-19 ENCOUNTER — APPOINTMENT (OUTPATIENT)
Dept: INFUSION THERAPY | Facility: CLINIC | Age: 75
End: 2020-11-19

## 2020-11-19 RX ORDER — IRON SUCROSE 20 MG/ML
200 INJECTION, SOLUTION INTRAVENOUS ONCE
Refills: 0 | Status: COMPLETED | OUTPATIENT
Start: 2020-11-19 | End: 2020-11-19

## 2020-11-19 RX ADMIN — IRON SUCROSE 220 MILLIGRAM(S): 20 INJECTION, SOLUTION INTRAVENOUS at 17:19

## 2020-12-03 ENCOUNTER — APPOINTMENT (OUTPATIENT)
Dept: INFUSION THERAPY | Facility: CLINIC | Age: 75
End: 2020-12-03

## 2020-12-03 VITALS
TEMPERATURE: 98.6 F | SYSTOLIC BLOOD PRESSURE: 142 MMHG | DIASTOLIC BLOOD PRESSURE: 75 MMHG | RESPIRATION RATE: 18 BRPM | HEART RATE: 68 BPM

## 2020-12-03 RX ORDER — IRON SUCROSE 20 MG/ML
200 INJECTION, SOLUTION INTRAVENOUS ONCE
Refills: 0 | Status: COMPLETED | OUTPATIENT
Start: 2020-12-03 | End: 2020-12-03

## 2020-12-03 RX ADMIN — IRON SUCROSE 200 MILLIGRAM(S): 20 INJECTION, SOLUTION INTRAVENOUS at 17:50

## 2020-12-03 RX ADMIN — IRON SUCROSE 220 MILLIGRAM(S): 20 INJECTION, SOLUTION INTRAVENOUS at 17:18

## 2020-12-10 ENCOUNTER — APPOINTMENT (OUTPATIENT)
Dept: INFUSION THERAPY | Facility: CLINIC | Age: 75
End: 2020-12-10

## 2020-12-10 RX ORDER — IRON SUCROSE 20 MG/ML
200 INJECTION, SOLUTION INTRAVENOUS ONCE
Refills: 0 | Status: COMPLETED | OUTPATIENT
Start: 2020-12-10 | End: 2020-12-10

## 2020-12-10 RX ADMIN — IRON SUCROSE 220 MILLIGRAM(S): 20 INJECTION, SOLUTION INTRAVENOUS at 17:00

## 2021-01-11 ENCOUNTER — LABORATORY RESULT (OUTPATIENT)
Age: 76
End: 2021-01-11

## 2021-01-11 ENCOUNTER — APPOINTMENT (OUTPATIENT)
Dept: HEMATOLOGY ONCOLOGY | Facility: CLINIC | Age: 76
End: 2021-01-11

## 2021-01-11 ENCOUNTER — OUTPATIENT (OUTPATIENT)
Dept: OUTPATIENT SERVICES | Facility: HOSPITAL | Age: 76
LOS: 1 days | Discharge: HOME | End: 2021-01-11

## 2021-01-11 DIAGNOSIS — Z86.2 PERSONAL HISTORY OF DISEASES OF THE BLOOD AND BLOOD-FORMING ORGANS AND CERTAIN DISORDERS INVOLVING THE IMMUNE MECHANISM: ICD-10-CM

## 2021-01-11 DIAGNOSIS — F17.200 NICOTINE DEPENDENCE, UNSPECIFIED, UNCOMPLICATED: ICD-10-CM

## 2021-01-11 DIAGNOSIS — D64.9 ANEMIA, UNSPECIFIED: ICD-10-CM

## 2021-01-11 DIAGNOSIS — Z00.00 ENCOUNTER FOR GENERAL ADULT MEDICAL EXAMINATION W/OUT ABNORMAL FINDINGS: ICD-10-CM

## 2021-01-12 LAB
ALBUMIN SERPL ELPH-MCNC: 4.2 G/DL
ALP BLD-CCNC: 67 U/L
ALT SERPL-CCNC: 6 U/L
ANION GAP SERPL CALC-SCNC: 9 MMOL/L
AST SERPL-CCNC: 12 U/L
BILIRUB DIRECT SERPL-MCNC: <0.2 MG/DL
BILIRUB INDIRECT SERPL-MCNC: NORMAL MG/DL
BILIRUB SERPL-MCNC: <0.2 MG/DL
BUN SERPL-MCNC: 14 MG/DL
CALCIUM SERPL-MCNC: 8.9 MG/DL
CHLORIDE SERPL-SCNC: 104 MMOL/L
CO2 SERPL-SCNC: 26 MMOL/L
CREAT SERPL-MCNC: 1.1 MG/DL
FERRITIN SERPL-MCNC: 39 NG/ML
GLUCOSE SERPL-MCNC: 127 MG/DL
HCT VFR BLD CALC: 42.4 %
HGB BLD-MCNC: 13.5 G/DL
IRON SATN MFR SERPL: 13 %
IRON SERPL-MCNC: 41 UG/DL
IRON SERPL-MCNC: 41 UG/DL
MCHC RBC-ENTMCNC: 27.2 PG
MCHC RBC-ENTMCNC: 31.8 G/DL
MCV RBC AUTO: 85.5 FL
PLATELET # BLD AUTO: 205 K/UL
PMV BLD: 9.9 FL
POTASSIUM SERPL-SCNC: 4.7 MMOL/L
PROT SERPL-MCNC: 7.1 G/DL
RBC # BLD: 4.96 M/UL
RBC # FLD: 21.3 %
SODIUM SERPL-SCNC: 139 MMOL/L
TIBC SERPL-MCNC: 306 UG/DL
UIBC SERPL-MCNC: 265 UG/DL
WBC # FLD AUTO: 8.02 K/UL

## 2021-01-13 ENCOUNTER — APPOINTMENT (OUTPATIENT)
Dept: HEMATOLOGY ONCOLOGY | Facility: CLINIC | Age: 76
End: 2021-01-13
Payer: COMMERCIAL

## 2021-01-13 PROCEDURE — 99213 OFFICE O/P EST LOW 20 MIN: CPT | Mod: 95

## 2021-01-13 NOTE — REASON FOR VISIT
[Follow-Up Visit] : a follow-up visit for [Home] : at home, [unfilled] , at the time of the visit. [Medical Office: ([facility])___] : at the medical office located in  [Verbal consent obtained from patient] : the patient, [unfilled]

## 2021-01-13 NOTE — REASON FOR VISIT
[Follow-Up Visit] : a follow-up visit for [Home] : at home, [unfilled] , at the time of the visit. [Medical Office: ([facility])___] : at the medical office located in  [Verbal consent obtained from patient] : the patient, [unfilled]

## 2021-01-15 NOTE — ASSESSMENT
[FreeTextEntry1] : Patient is a 74 yo M with PMHx AAA undergoing observation with vascular surgery presents to clinic for evaluation of anemia. Review of blood work shows that patient has a history of progressive anemia. Hemoglobin in 2016 was 13.9 with MCV of 86.9. Since then, he has had gradual onset of microcytic anemia with progressively decreasing hemoglobin levels (starting from October 2019). Most recent blood work done 1 week ago shows a Hb of 9.9, HCt 34.3, and MCV 78.3. Iron studies were done in February 2020 and showed Iron 24, TIBC 423, and Ferritin 11\par \par Will repeat CBC, BMP, LFTs, Iron studies with Ferritin \par Recommended that he start IV iron therapy with Venofer x 5 doses\par He will RTC in 3 months to reassess his anemia\par Advied to see GI; Pt refused and  will follow up with Dr. Castro DOMINGUEZ

## 2021-01-15 NOTE — HISTORY OF PRESENT ILLNESS
[de-identified] : Patient is a 76 yo M with PMHx AAA undergoing observation with vascular surgery presents to clinic for evaluation of anemia. Review of blood work shows that patient has a history of progressive anemia. Hemoglobin in 2016 was 13.9 with MCV of 86.9. Since then, he has had gradual onset of microcytic anemia with progressively decreasing hemoglobin levels (starting from October 2019). Most recent blood work done 1 week ago shows a Hb of 9.9, HCt 34.3, and MCV 78.3. Iron studies were done in February 2020 and showed Iron 24, TIBC 423, and Ferritin 11. Patient otherwise feels well. He denies any fatigue, shortness of breath, chest pain, abdominal pain, or blood in his stools. His last colonoscopy and endoscopy was 1 year ago with Dr. Erazo and reportedly was normal. He says he was told he could follow in 5 years for possible re-scoping. He continues to work and is fully functional at baseline.

## 2021-01-15 NOTE — HISTORY OF PRESENT ILLNESS
[de-identified] : Patient is a 76 yo M with PMHx AAA undergoing observation with vascular surgery presents to clinic for evaluation of anemia. Review of blood work shows that patient has a history of progressive anemia. Hemoglobin in 2016 was 13.9 with MCV of 86.9. Since then, he has had gradual onset of microcytic anemia with progressively decreasing hemoglobin levels (starting from October 2019). Most recent blood work done 1 week ago shows a Hb of 9.9, HCt 34.3, and MCV 78.3. Iron studies were done in February 2020 and showed Iron 24, TIBC 423, and Ferritin 11. Patient otherwise feels well. He denies any fatigue, shortness of breath, chest pain, abdominal pain, or blood in his stools. His last colonoscopy and endoscopy was 1 year ago with Dr. Erazo and reportedly was normal. He says he was told he could follow in 5 years for possible re-scoping. He continues to work and is fully functional at baseline.

## 2021-01-16 ENCOUNTER — TRANSCRIPTION ENCOUNTER (OUTPATIENT)
Age: 76
End: 2021-01-16

## 2021-02-08 ENCOUNTER — LABORATORY RESULT (OUTPATIENT)
Age: 76
End: 2021-02-08

## 2021-02-08 ENCOUNTER — APPOINTMENT (OUTPATIENT)
Dept: HEMATOLOGY ONCOLOGY | Facility: CLINIC | Age: 76
End: 2021-02-08

## 2021-02-09 LAB
FERRITIN SERPL-MCNC: 28 NG/ML
HCT VFR BLD CALC: 44.4 %
HGB BLD-MCNC: 14.3 G/DL
IRON SATN MFR SERPL: 14 %
IRON SERPL-MCNC: 39 UG/DL
MCHC RBC-ENTMCNC: 28.2 PG
MCHC RBC-ENTMCNC: 32.2 G/DL
MCV RBC AUTO: 87.6 FL
PLATELET # BLD AUTO: 215 K/UL
PMV BLD: 9.9 FL
RBC # BLD: 5.07 M/UL
RBC # FLD: 18 %
TIBC SERPL-MCNC: 276 UG/DL
UIBC SERPL-MCNC: 237 UG/DL
WBC # FLD AUTO: 6.48 K/UL

## 2021-02-10 ENCOUNTER — APPOINTMENT (OUTPATIENT)
Dept: HEMATOLOGY ONCOLOGY | Facility: CLINIC | Age: 76
End: 2021-02-10
Payer: COMMERCIAL

## 2021-02-10 PROCEDURE — 99213 OFFICE O/P EST LOW 20 MIN: CPT | Mod: 95

## 2021-02-10 NOTE — REVIEW OF SYSTEMS
[Cough] : cough [Fever] : no fever [Chills] : no chills [Night Sweats] : no night sweats [Fatigue] : no fatigue [Eye Pain] : no eye pain [Dysphagia] : no dysphagia [Palpitations] : no palpitations [Chest Pain] : no chest pain [Lower Ext Edema] : no lower extremity edema [Shortness Of Breath] : no shortness of breath [Wheezing] : no wheezing [Abdominal Pain] : no abdominal pain [Vomiting] : no vomiting [Constipation] : no constipation [Diarrhea] : no diarrhea [Dysuria] : no dysuria [Joint Pain] : no joint pain [Muscle Pain] : no muscle pain [Skin Rash] : no skin rash

## 2021-02-10 NOTE — PHYSICAL EXAM
[Restricted in physically strenuous activity but ambulatory and able to carry out work of a light or sedentary nature] : Status 1- Restricted in physically strenuous activity but ambulatory and able to carry out work of a light or sedentary nature, e.g., light house work, office work [Normal] : supple without JVD, no thyromegaly or masses appreciated [de-identified] : elyssa in bed, resting

## 2021-02-10 NOTE — HISTORY OF PRESENT ILLNESS
[de-identified] : Patient is a 74 yo M with PMHx AAA undergoing observation with vascular surgery presents to clinic for evaluation of anemia. Review of blood work shows that patient has a history of progressive anemia. Hemoglobin in 2016 was 13.9 with MCV of 86.9. Since then, he has had gradual onset of microcytic anemia with progressively decreasing hemoglobin levels (starting from October 2019). Most recent blood work done 1 week ago shows a Hb of 9.9, HCt 34.3, and MCV 78.3. Iron studies were done in February 2020 and showed Iron 24, TIBC 423, and Ferritin 11. Patient otherwise feels well. He denies any fatigue, shortness of breath, chest pain, abdominal pain, or blood in his stools. His last colonoscopy and endoscopy was 1 year ago with Dr. Erazo and reportedly was normal. He says he was told he could follow in 5 years for possible re-scoping. He continues to work and is fully functional at baseline.

## 2021-02-10 NOTE — REASON FOR VISIT
[Follow-Up Visit] : a follow-up visit for [Home] : at home, [unfilled] , at the time of the visit. [Medical Office: ([facility])___] : at the medical office located in  [Verbal consent obtained from patient] : the patient, [unfilled]

## 2021-02-10 NOTE — CONSULT LETTER
[Dear  ___] : Dear  [unfilled], [Consult Letter:] : I had the pleasure of evaluating your patient, [unfilled]. [Please see my note below.] : Please see my note below. [Consult Closing:] : Thank you very much for allowing me to participate in the care of this patient.  If you have any questions, please do not hesitate to contact me. [Sincerely,] : Sincerely, [FreeTextEntry2] : Jadon Villalba MD [FreeTextEntry3] : Leo Ledesma DO\par Attending Physician,\par Hematology/ Medical Oncology\par [phone] office\par \par

## 2021-02-10 NOTE — ASSESSMENT
[FreeTextEntry1] : Patient is a 74 yo M with PMHx AAA undergoing observation with vascular surgery presents to clinic for evaluation of anemia. Review of blood work shows that patient has a history of progressive anemia. Hemoglobin in 2016 was 13.9 with MCV of 86.9. Since then, he has had gradual onset of microcytic anemia with progressively decreasing hemoglobin levels (starting from October 2019). Most recent blood work done 1 week ago shows a Hb of 9.9, HCt 34.3, and MCV 78.3. Iron studies were done in February 2020 and showed Iron 24, TIBC 423, and Ferritin 11\par \par had been treated w/  IV iron therapy with Venofer x 5 doses; since now ferritin has decreased, recommend to restart IV venofer; \par \par concerned about the etiology of DELLA: Advied to see GI to evaluate for EGD, small bowel eval, and colonoscopy\par ; Pt agreed   will follow up with Dr. Erazo

## 2021-02-16 ENCOUNTER — APPOINTMENT (OUTPATIENT)
Dept: INFUSION THERAPY | Facility: CLINIC | Age: 76
End: 2021-02-16

## 2021-02-16 RX ORDER — IRON SUCROSE 20 MG/ML
200 INJECTION, SOLUTION INTRAVENOUS ONCE
Refills: 0 | Status: COMPLETED | OUTPATIENT
Start: 2021-02-16 | End: 2021-02-16

## 2021-02-16 RX ADMIN — IRON SUCROSE 220 MILLIGRAM(S): 20 INJECTION, SOLUTION INTRAVENOUS at 10:58

## 2021-02-16 RX ADMIN — IRON SUCROSE 200 MILLIGRAM(S): 20 INJECTION, SOLUTION INTRAVENOUS at 11:30

## 2021-02-23 ENCOUNTER — APPOINTMENT (OUTPATIENT)
Dept: INFUSION THERAPY | Facility: CLINIC | Age: 76
End: 2021-02-23

## 2021-02-23 RX ORDER — IRON SUCROSE 20 MG/ML
200 INJECTION, SOLUTION INTRAVENOUS ONCE
Refills: 0 | Status: COMPLETED | OUTPATIENT
Start: 2021-02-23 | End: 2021-02-23

## 2021-02-23 RX ADMIN — IRON SUCROSE 200 MILLIGRAM(S): 20 INJECTION, SOLUTION INTRAVENOUS at 11:15

## 2021-02-23 RX ADMIN — IRON SUCROSE 220 MILLIGRAM(S): 20 INJECTION, SOLUTION INTRAVENOUS at 10:42

## 2021-03-09 ENCOUNTER — APPOINTMENT (OUTPATIENT)
Dept: VASCULAR SURGERY | Facility: CLINIC | Age: 76
End: 2021-03-09
Payer: MEDICARE

## 2021-03-09 VITALS
WEIGHT: 181 LBS | DIASTOLIC BLOOD PRESSURE: 75 MMHG | SYSTOLIC BLOOD PRESSURE: 129 MMHG | TEMPERATURE: 97.2 F | BODY MASS INDEX: 27.43 KG/M2 | HEIGHT: 68 IN | HEART RATE: 85 BPM

## 2021-03-09 PROCEDURE — 99072 ADDL SUPL MATRL&STAF TM PHE: CPT

## 2021-03-09 PROCEDURE — 99213 OFFICE O/P EST LOW 20 MIN: CPT

## 2021-03-09 PROCEDURE — 93978 VASCULAR STUDY: CPT

## 2021-03-09 NOTE — DATA REVIEWED
[FreeTextEntry1] : I performed an arterial duplex which was medically necessary to evaluate for increase in size of the AAA. It showed infrarenal AAA at 4.7 cm, increased from 4.3 cm.\par

## 2021-03-09 NOTE — ASSESSMENT
[FreeTextEntry1] : 74 y/o gentleman with AAA presents for routine follow up.\par \par Aortic duplex today showed increase in size of the AAA to 4.7 cm from 4.3 cm six months ago.\par \par I would like to obtain a noncontrast CT scan of abdomen and pelvis to further evaluate the AAA size.

## 2021-03-10 ENCOUNTER — APPOINTMENT (OUTPATIENT)
Dept: INFUSION THERAPY | Facility: CLINIC | Age: 76
End: 2021-03-10

## 2021-03-10 RX ORDER — IRON SUCROSE 20 MG/ML
200 INJECTION, SOLUTION INTRAVENOUS ONCE
Refills: 0 | Status: COMPLETED | OUTPATIENT
Start: 2021-03-10 | End: 2021-03-10

## 2021-03-10 RX ADMIN — IRON SUCROSE 220 MILLIGRAM(S): 20 INJECTION, SOLUTION INTRAVENOUS at 10:05

## 2021-03-17 ENCOUNTER — APPOINTMENT (OUTPATIENT)
Dept: INFUSION THERAPY | Facility: CLINIC | Age: 76
End: 2021-03-17

## 2021-03-17 RX ORDER — IRON SUCROSE 20 MG/ML
200 INJECTION, SOLUTION INTRAVENOUS ONCE
Refills: 0 | Status: COMPLETED | OUTPATIENT
Start: 2021-03-17 | End: 2021-03-17

## 2021-03-17 RX ADMIN — IRON SUCROSE 200 MILLIGRAM(S): 20 INJECTION, SOLUTION INTRAVENOUS at 10:11

## 2021-03-17 RX ADMIN — IRON SUCROSE 220 MILLIGRAM(S): 20 INJECTION, SOLUTION INTRAVENOUS at 09:41

## 2021-03-21 ENCOUNTER — OUTPATIENT (OUTPATIENT)
Dept: OUTPATIENT SERVICES | Facility: HOSPITAL | Age: 76
LOS: 1 days | Discharge: HOME | End: 2021-03-21
Payer: MEDICARE

## 2021-03-21 ENCOUNTER — RESULT REVIEW (OUTPATIENT)
Age: 76
End: 2021-03-21

## 2021-03-21 DIAGNOSIS — R10.2 PELVIC AND PERINEAL PAIN: ICD-10-CM

## 2021-03-21 DIAGNOSIS — I71.4 ABDOMINAL AORTIC ANEURYSM, WITHOUT RUPTURE: ICD-10-CM

## 2021-03-21 DIAGNOSIS — R10.9 UNSPECIFIED ABDOMINAL PAIN: ICD-10-CM

## 2021-03-21 PROCEDURE — 74176 CT ABD & PELVIS W/O CONTRAST: CPT | Mod: 26

## 2021-03-23 ENCOUNTER — NON-APPOINTMENT (OUTPATIENT)
Age: 76
End: 2021-03-23

## 2021-03-24 ENCOUNTER — APPOINTMENT (OUTPATIENT)
Dept: INFUSION THERAPY | Facility: CLINIC | Age: 76
End: 2021-03-24

## 2021-03-24 RX ORDER — IRON SUCROSE 20 MG/ML
200 INJECTION, SOLUTION INTRAVENOUS ONCE
Refills: 0 | Status: COMPLETED | OUTPATIENT
Start: 2021-03-24 | End: 2021-03-24

## 2021-03-24 RX ADMIN — IRON SUCROSE 220 MILLIGRAM(S): 20 INJECTION, SOLUTION INTRAVENOUS at 10:59

## 2021-03-24 RX ADMIN — IRON SUCROSE 200 MILLIGRAM(S): 20 INJECTION, SOLUTION INTRAVENOUS at 11:59

## 2021-03-30 ENCOUNTER — APPOINTMENT (OUTPATIENT)
Dept: INFUSION THERAPY | Facility: CLINIC | Age: 76
End: 2021-03-30

## 2021-04-14 NOTE — CONSULT NOTE ADULT - CONSULT REQUESTED BY NAME
Endocrinology virtual Visit    Chief Complaint: Video Visit (dm1)     Information obtained from:Patient      Assessment/Treatment Plan:      Type 1 diabetes without known complications  Diagnosed about 2 years ago.  Currently using insulin pump.  [place].  We reviewed Dexcom data.  He has been experiencing postprandial hyperglycemia which was addressed by slightly increasing the carbohydrate ratio today.  He has had a few lows following stacking up insulin due to multiple correction doses -wait at least 3 hours before taking additional correction doses after a correction bolus.  We are reducing sensitivity in the evenings.  Overall, he did not have any significant lows (lows less than 1% of the time].   Plan  Basal -no change  0.85  CHO   Midnight to 10 AM in the morning 1 unit for every 15 g of carbohydrate  10 AM in the morning to midnight 1 unit for every 13 g of carbohydrates   Sensitivity factor   Midnight to 8 PM in the evening 1 unit for every 50 mg/dL of glucose  8 PM to midnight 1 unit for every 60 mg/dL of glucose (dose reduced]  Targets   Insulin active 3 hours     The changes made today are indicated in bold, insulin pump changes were made at the time of visit by the patient and correct entry of pump settings was verified.  We will have him follow-up with diabetes educator in 1-2 weeks and at that time we will download continuous glucose monitor again to reassess.      Patient Instructions     Alex,     Here are the changes we discussed about in bold.     Basal  0.85  CHO   Midnight to 10 AM in the morning 1 unit for every 15 g of carbohydrate  10 AM in the morning to midnight 1 unit for every 13 g of carbohydrates   Sensitivity factor   Midnight to 8 PM in the evening 1 unit for every 50 mg/dL of glucose  8 PM to midnight 1 unit for every 60 mg/dL of glucose (dose reduced]  Targets   Insulin active 3 hours     If you notice persistent low blood  med. sugars below 70; please call us at the following number [phone] select option #3.     Please schedule an appointment with the diabetes educator.      I will contact the patient with the test results.  Return to clinic in 2 months.    Test and/or medications prescribed today:  Orders Placed This Encounter   Procedures     AMBULATORY ADULT DIABETES EDUCATOR REFERRAL         Aleyda Matos MD  Staff Endocrinologist    Division of Endocrinology and Diabetes      Subjective:         HPI: Alex Hernandez is a 21 year old male with history of Type 1 diabetes who is seen in consultation at Lilian Castellanos's request for the same.     Type 1 diabetes diagnosed at the age of 19.  Has been using insulin pump since diagnosis.    Currently using Omni pod.  Pump information was not downloaded by the patient however per verbal report his current settings are as follows.    Pump settings  Basal  0.85  CHO 1:15  Sensitivity factor 1:50  Targets 130 [200]  Insulin active 3 hours   No recent history of DKA or severe hypoglycemia.  He uses Dexcom continuous glucose monitor which was downloaded today and we reviewed the data together.  He has noted that he has postprandial hyperglycemia particularly following lunch and dinner.  He takes multiple correction doses which has been leading to blood sugars to less than 70 however these episodes are not frequent.                            No Known Allergies    Current Outpatient Medications   Medication Sig Dispense Refill     Continuous Blood Gluc Sensor (DEXCOM G6 SENSOR) MISC Change every 10 days. 3 each 11     glucagon 1 MG kit Inject 1 mg into the muscle as needed for low blood sugar 1 each 3     insulin aspart prot & aspart (NOVOLOG MIX 70/30 FLEXPEN) (70-30) 100 UNIT/ML pen        INSULIN PUMP - OUTPATIENT          Review of Systems     8 point review system is negative or is as per HPI above.    Past Medical History:   Diagnosis Date     Depressive disorder      Type 1  diabetes (H)        No past surgical history on file.    Family History   Problem Relation Age of Onset     Lung Cancer Maternal Grandfather            Objective:   GENERAL: Healthy, alert and no distress  EYES: Eyes grossly normal to inspection.  No discharge or erythema, or obvious scleral/conjunctival abnormalities.  RESP: No audible wheeze, cough, or visible cyanosis.    SKIN: Visible skin clear.   NEURO: Cranial nerves grossly intact.    PSYCH: Mentation appears normal, affect normal/bright, judgement and insight intact, normal speech.    In House Labs:   Lab Results   Component Value Date    A1C 7.1 03/29/2021       Creatinine   Date Value Ref Range Status   03/29/2021 1.18 0.66 - 1.25 mg/dL Final   ]      Video-Visit Details    Type of service:  Video Visit  All times are in your local time  1st VideoStart: 04/14/2021 01:40 pm  Stop: 04/14/2021 02:10 pm    Originating Location (pt. Location): Home    Distant Location (provider location):  [facility]     Platform used for Video Visit: [employer]  56 minutes spent on the date of the encounter doing chart review, history and exam, documentation and further activities per the note.

## 2021-04-28 ENCOUNTER — APPOINTMENT (OUTPATIENT)
Dept: HEMATOLOGY ONCOLOGY | Facility: CLINIC | Age: 76
End: 2021-04-28

## 2021-04-28 ENCOUNTER — LABORATORY RESULT (OUTPATIENT)
Age: 76
End: 2021-04-28

## 2021-04-28 LAB
HCT VFR BLD CALC: 42.9 %
HGB BLD-MCNC: 14.4 G/DL
IRON SATN MFR SERPL: 43 %
IRON SERPL-MCNC: 106 UG/DL
MCHC RBC-ENTMCNC: 30.3 PG
MCHC RBC-ENTMCNC: 33.6 G/DL
MCV RBC AUTO: 90.3 FL
PLATELET # BLD AUTO: 175 K/UL
PMV BLD: 9.8 FL
RBC # BLD: 4.75 M/UL
RBC # FLD: 15.6 %
TIBC SERPL-MCNC: 246 UG/DL
UIBC SERPL-MCNC: 140 UG/DL
WBC # FLD AUTO: 6.51 K/UL

## 2021-04-29 LAB — FERRITIN SERPL-MCNC: 175 NG/ML

## 2021-04-30 ENCOUNTER — OUTPATIENT (OUTPATIENT)
Dept: OUTPATIENT SERVICES | Facility: HOSPITAL | Age: 76
LOS: 1 days | Discharge: HOME | End: 2021-04-30

## 2021-04-30 ENCOUNTER — APPOINTMENT (OUTPATIENT)
Dept: HEMATOLOGY ONCOLOGY | Facility: CLINIC | Age: 76
End: 2021-04-30
Payer: COMMERCIAL

## 2021-04-30 VITALS
TEMPERATURE: 98.2 F | SYSTOLIC BLOOD PRESSURE: 133 MMHG | DIASTOLIC BLOOD PRESSURE: 72 MMHG | HEIGHT: 68 IN | WEIGHT: 187 LBS | BODY MASS INDEX: 28.34 KG/M2 | RESPIRATION RATE: 16 BRPM

## 2021-04-30 DIAGNOSIS — D64.9 ANEMIA, UNSPECIFIED: ICD-10-CM

## 2021-04-30 PROCEDURE — 99212 OFFICE O/P EST SF 10 MIN: CPT

## 2021-04-30 NOTE — PHYSICAL EXAM
[Restricted in physically strenuous activity but ambulatory and able to carry out work of a light or sedentary nature] : Status 1- Restricted in physically strenuous activity but ambulatory and able to carry out work of a light or sedentary nature, e.g., light house work, office work [Normal] : supple without JVD, no thyromegaly or masses appreciated [de-identified] : eylssa in bed, resting

## 2021-04-30 NOTE — REASON FOR VISIT
[Follow-Up Visit] : a follow-up visit for [Home] : at home, [unfilled] , at the time of the visit. [Medical Office: ([facility])___] : at the medical office located in  [Verbal consent obtained from patient] : the patient, [unfilled]

## 2021-04-30 NOTE — HISTORY OF PRESENT ILLNESS
[de-identified] : Patient is a 76 yo M with PMHx AAA undergoing observation with vascular surgery presents to clinic for evaluation of anemia. Review of blood work shows that patient has a history of progressive anemia. Hemoglobin in 2016 was 13.9 with MCV of 86.9. Since then, he has had gradual onset of microcytic anemia with progressively decreasing hemoglobin levels (starting from October 2019). Most recent blood work done 1 week ago shows a Hb of 9.9, HCt 34.3, and MCV 78.3. Iron studies were done in February 2020 and showed Iron 24, TIBC 423, and Ferritin 11. Patient otherwise feels well. He denies any fatigue, shortness of breath, chest pain, abdominal pain, or blood in his stools. His last colonoscopy and endoscopy was 1 year ago with Dr. Erazo and reportedly was normal. He says he was told he could follow in 5 years for possible re-scoping. He continues to work and is fully functional at baseline.  [de-identified] : \par 4/30/21\par feels well, no sx\par f/u 4 mo and repeat labs\par endoscopy 2/21\par colonoscopy 2 yrs ago - polyps\par f/u Dr Erazo\par

## 2021-04-30 NOTE — CONSULT LETTER
[Dear  ___] : Dear  [unfilled], [Consult Letter:] : I had the pleasure of evaluating your patient, [unfilled]. [Please see my note below.] : Please see my note below. [Consult Closing:] : Thank you very much for allowing me to participate in the care of this patient.  If you have any questions, please do not hesitate to contact me. [Sincerely,] : Sincerely, [FreeTextEntry2] : Jadon Villalba MD [FreeTextEntry3] : Leo Ledesma DO\par Attending Physician,\par Hematology/ Medical Oncology\par [phone] office\par \par

## 2021-04-30 NOTE — ASSESSMENT
[FreeTextEntry1] : Patient is a 76 yo M with PMHx AAA undergoing observation with vascular surgery presents to clinic for evaluation of anemia. Review of blood work shows that patient has a history of progressive anemia. Hemoglobin in 2016 was 13.9 with MCV of 86.9. Since then, he has had gradual onset of microcytic anemia with progressively decreasing hemoglobin levels (starting from October 2019). \par \par s/p venofer 1000mg completed March 2021\par full resoultion of anemia and iron deficiency\par f/u GI\par rtc in 3-4 mos\par \par

## 2021-08-06 ENCOUNTER — APPOINTMENT (OUTPATIENT)
Dept: HEMATOLOGY ONCOLOGY | Facility: CLINIC | Age: 76
End: 2021-08-06

## 2021-08-06 ENCOUNTER — APPOINTMENT (OUTPATIENT)
Dept: VASCULAR SURGERY | Facility: CLINIC | Age: 76
End: 2021-08-06
Payer: MEDICARE

## 2021-08-06 ENCOUNTER — LABORATORY RESULT (OUTPATIENT)
Age: 76
End: 2021-08-06

## 2021-08-06 VITALS
DIASTOLIC BLOOD PRESSURE: 80 MMHG | SYSTOLIC BLOOD PRESSURE: 123 MMHG | HEART RATE: 85 BPM | HEIGHT: 68 IN | WEIGHT: 185 LBS | BODY MASS INDEX: 28.04 KG/M2 | TEMPERATURE: 98 F

## 2021-08-06 LAB
HCT VFR BLD CALC: 44.4 %
HGB BLD-MCNC: 15 G/DL
IRON SATN MFR SERPL: 29 %
IRON SERPL-MCNC: 72 UG/DL
MCHC RBC-ENTMCNC: 31.2 PG
MCHC RBC-ENTMCNC: 33.8 G/DL
MCV RBC AUTO: 92.3 FL
PLATELET # BLD AUTO: 195 K/UL
PMV BLD: 9.6 FL
RBC # BLD: 4.81 M/UL
RBC # FLD: 13.7 %
TIBC SERPL-MCNC: 250 UG/DL
UIBC SERPL-MCNC: 178 UG/DL
WBC # FLD AUTO: 7.81 K/UL

## 2021-08-06 PROCEDURE — 93978 VASCULAR STUDY: CPT

## 2021-08-06 PROCEDURE — 99214 OFFICE O/P EST MOD 30 MIN: CPT

## 2021-08-09 LAB — FERRITIN SERPL-MCNC: 102 NG/ML

## 2021-08-13 ENCOUNTER — APPOINTMENT (OUTPATIENT)
Dept: HEMATOLOGY ONCOLOGY | Facility: CLINIC | Age: 76
End: 2021-08-13
Payer: MEDICARE

## 2021-08-13 PROCEDURE — 99213 OFFICE O/P EST LOW 20 MIN: CPT | Mod: 95

## 2021-08-13 NOTE — HISTORY OF PRESENT ILLNESS
[de-identified] : \par 4/30/21\par feels well, no sx\par f/u 4 mo and repeat labs\par endoscopy 2/21\par colonoscopy 2 yrs ago - polyps\par f/u Dr Erazo\par  [de-identified] : Patient is a 76 yo M with PMHx AAA undergoing observation with vascular surgery presents to clinic for evaluation of anemia. Review of blood work shows that patient has a history of progressive anemia. Hemoglobin in 2016 was 13.9 with MCV of 86.9. Since then, he has had gradual onset of microcytic anemia with progressively decreasing hemoglobin levels (starting from October 2019). Most recent blood work done 1 week ago shows a Hb of 9.9, HCt 34.3, and MCV 78.3. Iron studies were done in February 2020 and showed Iron 24, TIBC 423, and Ferritin 11. Patient otherwise feels well. He denies any fatigue, shortness of breath, chest pain, abdominal pain, or blood in his stools. His last colonoscopy and endoscopy was 1 year ago with Dr. Erazo and reportedly was normal. He says he was told he could follow in 5 years for possible re-scoping. He continues to work and is fully functional at baseline.

## 2021-08-13 NOTE — ASSESSMENT
[FreeTextEntry1] : Patient is a 76 yo M with PMHx AAA undergoing observation with vascular surgery presents to clinic for evaluation of anemia. Review of blood work shows that patient has a history of progressive anemia. Hemoglobin in 2016 was 13.9 with MCV of 86.9. Since then, he has had gradual onset of microcytic anemia with progressively decreasing hemoglobin levels (starting from October 2019). \par \par s/p venofer 1000mg completed March 2021\par full resoultion of anemia and iron deficiency\par f/u GI\par rtc in 2 mos\par \par

## 2021-08-13 NOTE — CONSULT LETTER
[Dear  ___] : Dear  [unfilled], [Consult Letter:] : I had the pleasure of evaluating your patient, [unfilled]. [Please see my note below.] : Please see my note below. [Consult Closing:] : Thank you very much for allowing me to participate in the care of this patient.  If you have any questions, please do not hesitate to contact me. [Sincerely,] : Sincerely, [FreeTextEntry2] : Jadon Villalba MD [FreeTextEntry3] : Leo Ledesma DO\par Attending Physician,\par Hematology/ Medical Oncology\par [phone] office\par \par

## 2021-08-13 NOTE — REASON FOR VISIT
[Follow-Up Visit] : a follow-up visit for [Home] : at home, [unfilled] , at the time of the visit. [Medical Office: ([facility])___] : at the medical office located in  [Verbal consent obtained from patient] : the patient, [unfilled]

## 2021-08-27 ENCOUNTER — APPOINTMENT (OUTPATIENT)
Dept: HEMATOLOGY ONCOLOGY | Facility: CLINIC | Age: 76
End: 2021-08-27

## 2021-10-12 ENCOUNTER — APPOINTMENT (OUTPATIENT)
Dept: HEMATOLOGY ONCOLOGY | Facility: CLINIC | Age: 76
End: 2021-10-12

## 2021-10-15 ENCOUNTER — APPOINTMENT (OUTPATIENT)
Dept: HEMATOLOGY ONCOLOGY | Facility: CLINIC | Age: 76
End: 2021-10-15

## 2021-10-20 ENCOUNTER — LABORATORY RESULT (OUTPATIENT)
Age: 76
End: 2021-10-20

## 2021-10-20 ENCOUNTER — APPOINTMENT (OUTPATIENT)
Dept: HEMATOLOGY ONCOLOGY | Facility: CLINIC | Age: 76
End: 2021-10-20

## 2021-10-20 LAB
ALBUMIN SERPL ELPH-MCNC: 4.4 G/DL
ALP BLD-CCNC: 64 U/L
ALT SERPL-CCNC: 6 U/L
ANION GAP SERPL CALC-SCNC: 13 MMOL/L
AST SERPL-CCNC: 10 U/L
BILIRUB DIRECT SERPL-MCNC: <0.2 MG/DL
BILIRUB INDIRECT SERPL-MCNC: >0.1 MG/DL
BILIRUB SERPL-MCNC: 0.3 MG/DL
BUN SERPL-MCNC: 16 MG/DL
CALCIUM SERPL-MCNC: 9 MG/DL
CHLORIDE SERPL-SCNC: 103 MMOL/L
CO2 SERPL-SCNC: 22 MMOL/L
CREAT SERPL-MCNC: 1.1 MG/DL
GLUCOSE SERPL-MCNC: 88 MG/DL
HCT VFR BLD CALC: 43.7 %
HGB BLD-MCNC: 14.9 G/DL
IRON SATN MFR SERPL: 25 %
IRON SERPL-MCNC: 69 UG/DL
MCHC RBC-ENTMCNC: 31.1 PG
MCHC RBC-ENTMCNC: 34.1 G/DL
MCV RBC AUTO: 91.2 FL
PLATELET # BLD AUTO: 192 K/UL
PMV BLD: 9.4 FL
POTASSIUM SERPL-SCNC: 4.7 MMOL/L
PROT SERPL-MCNC: 7.3 G/DL
RBC # BLD: 4.79 M/UL
RBC # FLD: 13.6 %
SODIUM SERPL-SCNC: 138 MMOL/L
TIBC SERPL-MCNC: 275 UG/DL
UIBC SERPL-MCNC: 206 UG/DL
WBC # FLD AUTO: 6.89 K/UL

## 2021-10-21 LAB — FERRITIN SERPL-MCNC: 74 NG/ML

## 2021-10-28 ENCOUNTER — APPOINTMENT (OUTPATIENT)
Dept: HEMATOLOGY ONCOLOGY | Facility: CLINIC | Age: 76
End: 2021-10-28

## 2021-10-28 ENCOUNTER — APPOINTMENT (OUTPATIENT)
Dept: HEMATOLOGY ONCOLOGY | Facility: CLINIC | Age: 76
End: 2021-10-28
Payer: MEDICARE

## 2021-10-28 ENCOUNTER — OUTPATIENT (OUTPATIENT)
Dept: OUTPATIENT SERVICES | Facility: HOSPITAL | Age: 76
LOS: 1 days | Discharge: HOME | End: 2021-10-28

## 2021-10-28 DIAGNOSIS — D64.9 ANEMIA, UNSPECIFIED: ICD-10-CM

## 2021-10-28 PROCEDURE — 99213 OFFICE O/P EST LOW 20 MIN: CPT | Mod: 95

## 2021-10-28 NOTE — REASON FOR VISIT
[Follow-Up Visit] : a follow-up visit for [Home] : at home, [unfilled] , at the time of the visit. [Medical Office: ([facility])___] : at the medical office located in  [Verbal consent obtained from patient] : the patient, [unfilled]

## 2021-10-28 NOTE — HISTORY OF PRESENT ILLNESS
[de-identified] : Patient is a 74 yo M with PMHx AAA undergoing observation with vascular surgery presents to clinic for evaluation of anemia. Review of blood work shows that patient has a history of progressive anemia. Hemoglobin in 2016 was 13.9 with MCV of 86.9. Since then, he has had gradual onset of microcytic anemia with progressively decreasing hemoglobin levels (starting from October 2019). Most recent blood work done 1 week ago shows a Hb of 9.9, HCt 34.3, and MCV 78.3. Iron studies were done in February 2020 and showed Iron 24, TIBC 423, and Ferritin 11. Patient otherwise feels well. He denies any fatigue, shortness of breath, chest pain, abdominal pain, or blood in his stools. His last colonoscopy and endoscopy was 1 year ago with Dr. Erazo and reportedly was normal. He says he was told he could follow in 5 years for possible re-scoping. He continues to work and is fully functional at baseline.  [de-identified] : \par 4/30/21\par feels well, no sx\par f/u 4 mo and repeat labs\par endoscopy 2/21\par colonoscopy 2 yrs ago - polyps\par f/u Dr Erazo\par

## 2021-10-28 NOTE — ASSESSMENT
[FreeTextEntry1] : Patient is a 74 yo M with PMHx AAA undergoing observation with vascular surgery presents to clinic for evaluation of anemia. Review of blood work shows that patient has a history of progressive anemia. Hemoglobin in 2016 was 13.9 with MCV of 86.9. Since then, he has had gradual onset of microcytic anemia with progressively decreasing hemoglobin levels (starting from October 2019). \par \par s/p venofer 1000mg completed March 2021\par full resoultion of anemia and iron deficiency\par f/u GI\par rtc in 2 mos\par \par

## 2021-10-28 NOTE — CONSULT LETTER
[Dear  ___] : Dear  [unfilled], [Consult Letter:] : I had the pleasure of evaluating your patient, [unfilled]. [Please see my note below.] : Please see my note below. [Consult Closing:] : Thank you very much for allowing me to participate in the care of this patient.  If you have any questions, please do not hesitate to contact me. [Sincerely,] : Sincerely, [FreeTextEntry2] : Jadon Villalba MD [FreeTextEntry3] : Leo Ledesma DO\par Attending Physician,\par Hematology/ Medical Oncology\par [phone] office\par \par

## 2021-11-17 ENCOUNTER — APPOINTMENT (OUTPATIENT)
Dept: VASCULAR SURGERY | Facility: CLINIC | Age: 76
End: 2021-11-17
Payer: MEDICARE

## 2021-11-17 VITALS
OXYGEN SATURATION: 98 % | WEIGHT: 185 LBS | SYSTOLIC BLOOD PRESSURE: 140 MMHG | TEMPERATURE: 95.6 F | BODY MASS INDEX: 28.04 KG/M2 | DIASTOLIC BLOOD PRESSURE: 85 MMHG | HEIGHT: 68 IN | HEART RATE: 66 BPM

## 2021-11-17 PROCEDURE — 99214 OFFICE O/P EST MOD 30 MIN: CPT

## 2021-11-17 PROCEDURE — 93978 VASCULAR STUDY: CPT

## 2021-11-17 NOTE — HISTORY OF PRESENT ILLNESS
[FreeTextEntry1] :   \par 76 y/o gentleman with AAA presents for routine follow up.\par \par The patient presents for a 3 month followup visit.His last aortic duplex showed AAA to 4.6 cm, CT scan from March 2021 reviewed that showed AAA at 4.1 cm .\par Today he presents for an aortic duplex to evaluate his infrarenal aortic aneurysm size. He denies any abdominal or back pain.

## 2021-12-13 ENCOUNTER — LABORATORY RESULT (OUTPATIENT)
Age: 76
End: 2021-12-13

## 2021-12-13 ENCOUNTER — OUTPATIENT (OUTPATIENT)
Dept: OUTPATIENT SERVICES | Facility: HOSPITAL | Age: 76
LOS: 1 days | Discharge: HOME | End: 2021-12-13

## 2021-12-13 ENCOUNTER — APPOINTMENT (OUTPATIENT)
Dept: HEMATOLOGY ONCOLOGY | Facility: CLINIC | Age: 76
End: 2021-12-13

## 2021-12-13 DIAGNOSIS — D64.9 ANEMIA, UNSPECIFIED: ICD-10-CM

## 2021-12-13 LAB
ALBUMIN SERPL ELPH-MCNC: 4.4 G/DL
ALP BLD-CCNC: 65 U/L
ALT SERPL-CCNC: 8 U/L
ANION GAP SERPL CALC-SCNC: 16 MMOL/L
AST SERPL-CCNC: 11 U/L
BILIRUB DIRECT SERPL-MCNC: <0.2 MG/DL
BILIRUB INDIRECT SERPL-MCNC: >0.1 MG/DL
BILIRUB SERPL-MCNC: 0.3 MG/DL
BUN SERPL-MCNC: 17 MG/DL
CALCIUM SERPL-MCNC: 9.1 MG/DL
CHLORIDE SERPL-SCNC: 104 MMOL/L
CO2 SERPL-SCNC: 20 MMOL/L
CREAT SERPL-MCNC: 1.1 MG/DL
GLUCOSE SERPL-MCNC: 88 MG/DL
HCT VFR BLD CALC: 45.2 %
HGB BLD-MCNC: 15.1 G/DL
IRON SATN MFR SERPL: 17 %
IRON SERPL-MCNC: 52 UG/DL
MCHC RBC-ENTMCNC: 30.3 PG
MCHC RBC-ENTMCNC: 33.4 G/DL
MCV RBC AUTO: 90.6 FL
PLATELET # BLD AUTO: 202 K/UL
PMV BLD: 9.7 FL
POTASSIUM SERPL-SCNC: 4.8 MMOL/L
PROT SERPL-MCNC: 7.5 G/DL
RBC # BLD: 4.99 M/UL
RBC # FLD: 13.9 %
SODIUM SERPL-SCNC: 140 MMOL/L
TIBC SERPL-MCNC: 302 UG/DL
UIBC SERPL-MCNC: 250 UG/DL
WBC # FLD AUTO: 7.4 K/UL

## 2021-12-14 LAB — FERRITIN SERPL-MCNC: 47 NG/ML

## 2021-12-16 ENCOUNTER — OUTPATIENT (OUTPATIENT)
Dept: OUTPATIENT SERVICES | Facility: HOSPITAL | Age: 76
LOS: 1 days | Discharge: HOME | End: 2021-12-16

## 2021-12-16 ENCOUNTER — APPOINTMENT (OUTPATIENT)
Dept: HEMATOLOGY ONCOLOGY | Facility: CLINIC | Age: 76
End: 2021-12-16
Payer: MEDICARE

## 2021-12-16 PROCEDURE — 99214 OFFICE O/P EST MOD 30 MIN: CPT | Mod: 95

## 2021-12-16 NOTE — REASON FOR VISIT
[Follow-Up Visit] : a follow-up visit for [Home] : at home, [unfilled] , at the time of the visit. [Medical Office: ([facility])___] : at the medical office located in  [Verbal consent obtained from patient] : the patient, [unfilled]

## 2021-12-16 NOTE — PHYSICAL EXAM
[Restricted in physically strenuous activity but ambulatory and able to carry out work of a light or sedentary nature] : Status 1- Restricted in physically strenuous activity but ambulatory and able to carry out work of a light or sedentary nature, e.g., light house work, office work Discharged

## 2021-12-16 NOTE — HISTORY OF PRESENT ILLNESS
[de-identified] : Patient is a 76 yo M with PMHx AAA undergoing observation with vascular surgery presents to clinic for evaluation of anemia. Review of blood work shows that patient has a history of progressive anemia. Hemoglobin in 2016 was 13.9 with MCV of 86.9. Since then, he has had gradual onset of microcytic anemia with progressively decreasing hemoglobin levels (starting from October 2019). Most recent blood work done 1 week ago shows a Hb of 9.9, HCt 34.3, and MCV 78.3. Iron studies were done in February 2020 and showed Iron 24, TIBC 423, and Ferritin 11. Patient otherwise feels well. He denies any fatigue, shortness of breath, chest pain, abdominal pain, or blood in his stools. His last colonoscopy and endoscopy was 1 year ago with Dr. Erazo and reportedly was normal. He says he was told he could follow in 5 years for possible re-scoping. He continues to work and is fully functional at baseline.  [de-identified] : \par 4/30/21\par feels well, no sx\par f/u 4 mo and repeat labs\par endoscopy 2/21\par colonoscopy 2 yrs ago - polyps\par f/u Dr Erazo\par

## 2021-12-16 NOTE — CONSULT LETTER
[Dear  ___] : Dear  [unfilled], [Consult Letter:] : I had the pleasure of evaluating your patient, [unfilled]. [Please see my note below.] : Please see my note below. [Consult Closing:] : Thank you very much for allowing me to participate in the care of this patient.  If you have any questions, please do not hesitate to contact me. [Sincerely,] : Sincerely, [FreeTextEntry2] : Jadon Villalba MD [FreeTextEntry3] : Leo Ledesma DO\par Attending Physician,\par Hematology/ Medical Oncology\par [phone] office\par \par

## 2021-12-16 NOTE — ASSESSMENT
[FreeTextEntry1] : 75 yo M with PMHx AAA undergoing observation with vascular surgery presents to clinic for evaluation of anemia. Review of blood work shows that patient has a history of progressive anemia. Hemoglobin in 2016 was 13.9 with MCV of 86.9. Since then, he has had gradual onset of microcytic anemia with progressively decreasing hemoglobin levels (starting from October 2019). \par \par s/p venofer 1000mg completed March 2021\par full resoultion of anemia and iron deficiency\par f/u GI\par rtc in 2 mos\par \par

## 2021-12-17 ENCOUNTER — APPOINTMENT (OUTPATIENT)
Dept: CARDIOLOGY | Facility: CLINIC | Age: 76
End: 2021-12-17
Payer: MEDICARE

## 2021-12-17 VITALS
HEIGHT: 68 IN | DIASTOLIC BLOOD PRESSURE: 80 MMHG | WEIGHT: 186 LBS | SYSTOLIC BLOOD PRESSURE: 128 MMHG | BODY MASS INDEX: 28.19 KG/M2

## 2021-12-17 DIAGNOSIS — I49.9 CARDIAC ARRHYTHMIA, UNSPECIFIED: ICD-10-CM

## 2021-12-17 PROCEDURE — 93242 EXT ECG>48HR<7D RECORDING: CPT

## 2021-12-17 PROCEDURE — 93000 ELECTROCARDIOGRAM COMPLETE: CPT | Mod: 59

## 2021-12-17 PROCEDURE — 99204 OFFICE O/P NEW MOD 45 MIN: CPT

## 2021-12-18 PROBLEM — I49.9 CARDIAC ARRHYTHMIA, UNSPECIFIED: Status: ACTIVE | Noted: 2021-12-18

## 2021-12-18 RX ORDER — ESOMEPRAZOLE MAGNESIUM 40 MG/1
40 CAPSULE, DELAYED RELEASE ORAL DAILY
Refills: 0 | Status: ACTIVE | COMMUNITY

## 2021-12-18 RX ORDER — TRAZODONE HYDROCHLORIDE 50 MG/1
50 TABLET ORAL
Refills: 0 | Status: ACTIVE | COMMUNITY

## 2021-12-18 NOTE — HISTORY OF PRESENT ILLNESS
[FreeTextEntry1] : 76-yo male with h/o PAD, AAA, anemia, chronic back pain who was scheduled for epidural injection. Irregular heart beat was noted and the procedure was cancelled. Patient denies palpitations, SOB, CP.\par \par Still smokes 1 ppd.

## 2021-12-18 NOTE — ASSESSMENT
[FreeTextEntry1] : 76-yo male with h/o AAA and LE PAD.\par ? cardiac arrhythmia documented prior to epidural injection.\par \par Plan:\par Smoking cessation discussed with patient.\par Holter monitor for 1 week.\par 2D ECHO.\par Will try to review last fasting blood work.\par F/u in 1 month.\par \par Landry Thakkar MD\par

## 2021-12-18 NOTE — PHYSICAL EXAM
[Well Developed] : well developed [Well Nourished] : well nourished [No Acute Distress] : no acute distress [Normal Conjunctiva] : normal conjunctiva [Normal Venous Pressure] : normal venous pressure [No Carotid Bruit] : no carotid bruit [Normal S1, S2] : normal S1, S2 [No Murmur] : no murmur [No Rub] : no rub [No Gallop] : no gallop [Clear Lung Fields] : clear lung fields [Good Air Entry] : good air entry [No Respiratory Distress] : no respiratory distress  [Soft] : abdomen soft [Non Tender] : non-tender [Normal Bowel Sounds] : normal bowel sounds [Normal Gait] : normal gait [No Edema] : no edema [No Cyanosis] : no cyanosis [No Clubbing] : no clubbing [No Varicosities] : no varicosities [Diminished Pedal Pulses ___] : diminished pedal pulses [unfilled] [No Rash] : no rash [No Skin Lesions] : no skin lesions [Moves all extremities] : moves all extremities [No Focal Deficits] : no focal deficits [Normal Speech] : normal speech [Alert and Oriented] : alert and oriented [Normal memory] : normal memory

## 2021-12-18 NOTE — REVIEW OF SYSTEMS
[Dyspnea on exertion] : not dyspnea during exertion [Chest Discomfort] : no chest discomfort [Lower Ext Edema] : no extremity edema [Leg Claudication] : intermittent leg claudication [Palpitations] : no palpitations [Orthopnea] : no orthopnea [Syncope] : no syncope [Cough] : cough [Wheezing] : no wheezing [Joint Pain] : joint pain [Joint Stiffness] : joint stiffness [Rash] : no rash [Anxiety] : no anxiety [Negative] : Neurological

## 2021-12-20 ENCOUNTER — RESULT CHARGE (OUTPATIENT)
Age: 76
End: 2021-12-20

## 2022-01-11 NOTE — PATIENT PROFILE ADULT - VISION (WITH CORRECTIVE LENSES IF THE PATIENT USUALLY WEARS THEM):
Mycolog rx escribed to pharmacy   Normal vision: sees adequately in most situations; can see medication labels, newsprint

## 2022-02-03 ENCOUNTER — APPOINTMENT (OUTPATIENT)
Dept: CARDIOLOGY | Facility: CLINIC | Age: 77
End: 2022-02-03
Payer: MEDICARE

## 2022-02-03 PROCEDURE — 93306 TTE W/DOPPLER COMPLETE: CPT

## 2022-02-14 ENCOUNTER — LABORATORY RESULT (OUTPATIENT)
Age: 77
End: 2022-02-14

## 2022-02-14 ENCOUNTER — APPOINTMENT (OUTPATIENT)
Dept: HEMATOLOGY ONCOLOGY | Facility: CLINIC | Age: 77
End: 2022-02-14

## 2022-02-15 LAB
ALBUMIN SERPL ELPH-MCNC: 4.3 G/DL
ALP BLD-CCNC: 5 U/L
ALT SERPL-CCNC: <5 U/L
ANION GAP SERPL CALC-SCNC: 9 MMOL/L
AST SERPL-CCNC: 11 U/L
BILIRUB DIRECT SERPL-MCNC: <0.2 MG/DL
BILIRUB INDIRECT SERPL-MCNC: >0 MG/DL
BILIRUB SERPL-MCNC: 0.2 MG/DL
BUN SERPL-MCNC: 21 MG/DL
CALCIUM SERPL-MCNC: 8.7 MG/DL
CHLORIDE SERPL-SCNC: 106 MMOL/L
CO2 SERPL-SCNC: 23 MMOL/L
CREAT SERPL-MCNC: 1 MG/DL
FERRITIN SERPL-MCNC: 24 NG/ML
GLUCOSE SERPL-MCNC: 97 MG/DL
HCT VFR BLD CALC: 42.7 %
HGB BLD-MCNC: 13.9 G/DL
IRON SATN MFR SERPL: 24 %
IRON SERPL-MCNC: 72 UG/DL
MCHC RBC-ENTMCNC: 29.5 PG
MCHC RBC-ENTMCNC: 32.6 G/DL
MCV RBC AUTO: 90.7 FL
PLATELET # BLD AUTO: 215 K/UL
PMV BLD: 9.8 FL
POTASSIUM SERPL-SCNC: 4.9 MMOL/L
PROT SERPL-MCNC: 7.1 G/DL
RBC # BLD: 4.71 M/UL
RBC # FLD: 14.2 %
SODIUM SERPL-SCNC: 138 MMOL/L
TIBC SERPL-MCNC: 295 UG/DL
UIBC SERPL-MCNC: 223 UG/DL
WBC # FLD AUTO: 6.34 K/UL

## 2022-02-17 ENCOUNTER — APPOINTMENT (OUTPATIENT)
Dept: HEMATOLOGY ONCOLOGY | Facility: CLINIC | Age: 77
End: 2022-02-17
Payer: MEDICARE

## 2022-02-17 PROCEDURE — 99214 OFFICE O/P EST MOD 30 MIN: CPT | Mod: 95

## 2022-02-17 NOTE — CONSULT LETTER
[Dear  ___] : Dear  [unfilled], [Consult Letter:] : I had the pleasure of evaluating your patient, [unfilled]. [Please see my note below.] : Please see my note below. [Consult Closing:] : Thank you very much for allowing me to participate in the care of this patient.  If you have any questions, please do not hesitate to contact me. [Sincerely,] : Sincerely, [FreeTextEntry2] : Jadon Villalba MD [FreeTextEntry3] : Leo Ledesma DO\par Attending Physician,\par Hematology/ Medical Oncology\par [phone] office\par \par

## 2022-02-17 NOTE — ASSESSMENT
[FreeTextEntry1] : 77 yo M with PMHx AAA undergoing observation with vascular surgery presents to clinic for evaluation of anemia. Review of blood work shows that patient has a history of progressive anemia. Hemoglobin in 2016 was 13.9 with MCV of 86.9. Since then, he has had gradual onset of microcytic anemia with progressively decreasing hemoglobin levels (starting from October 2019). \par \par s/p venofer 1000mg completed March 2021 with full resoultion of anemia and iron deficiency; however, again low iron stores with decreasing HG. I recommend to restart iron IV; moreover, strongly recommend GI w/u to r/o bleeding \par \par

## 2022-02-17 NOTE — HISTORY OF PRESENT ILLNESS
[de-identified] : Patient is a 76 yo M with PMHx AAA undergoing observation with vascular surgery presents to clinic for evaluation of anemia. Review of blood work shows that patient has a history of progressive anemia. Hemoglobin in 2016 was 13.9 with MCV of 86.9. Since then, he has had gradual onset of microcytic anemia with progressively decreasing hemoglobin levels (starting from October 2019). Most recent blood work done 1 week ago shows a Hb of 9.9, HCt 34.3, and MCV 78.3. Iron studies were done in February 2020 and showed Iron 24, TIBC 423, and Ferritin 11. Patient otherwise feels well. He denies any fatigue, shortness of breath, chest pain, abdominal pain, or blood in his stools. His last colonoscopy and endoscopy was 1 year ago with Dr. Erazo and reportedly was normal. He says he was told he could follow in 5 years for possible re-scoping. He continues to work and is fully functional at baseline.  [de-identified] : \par 4/30/21\par feels well, no sx\par f/u 4 mo and repeat labs\par endoscopy 2/21\par colonoscopy 2 yrs ago - polyps\par f/u Dr Erazo\par

## 2022-02-28 ENCOUNTER — APPOINTMENT (OUTPATIENT)
Dept: INFUSION THERAPY | Facility: CLINIC | Age: 77
End: 2022-02-28

## 2022-02-28 RX ORDER — IRON SUCROSE 20 MG/ML
200 INJECTION, SOLUTION INTRAVENOUS ONCE
Refills: 0 | Status: COMPLETED | OUTPATIENT
Start: 2022-02-28 | End: 2022-02-28

## 2022-02-28 RX ADMIN — IRON SUCROSE 220 MILLIGRAM(S): 20 INJECTION, SOLUTION INTRAVENOUS at 11:41

## 2022-03-03 DIAGNOSIS — D64.9 ANEMIA, UNSPECIFIED: ICD-10-CM

## 2022-03-07 ENCOUNTER — APPOINTMENT (OUTPATIENT)
Dept: INFUSION THERAPY | Facility: CLINIC | Age: 77
End: 2022-03-07

## 2022-03-07 RX ORDER — IRON SUCROSE 20 MG/ML
200 INJECTION, SOLUTION INTRAVENOUS ONCE
Refills: 0 | Status: COMPLETED | OUTPATIENT
Start: 2022-03-07 | End: 2022-03-07

## 2022-03-07 RX ADMIN — IRON SUCROSE 220 MILLIGRAM(S): 20 INJECTION, SOLUTION INTRAVENOUS at 09:21

## 2022-03-14 ENCOUNTER — APPOINTMENT (OUTPATIENT)
Dept: INFUSION THERAPY | Facility: CLINIC | Age: 77
End: 2022-03-14

## 2022-03-14 RX ORDER — IRON SUCROSE 20 MG/ML
200 INJECTION, SOLUTION INTRAVENOUS ONCE
Refills: 0 | Status: COMPLETED | OUTPATIENT
Start: 2022-03-14 | End: 2022-03-14

## 2022-03-14 RX ADMIN — IRON SUCROSE 220 MILLIGRAM(S): 20 INJECTION, SOLUTION INTRAVENOUS at 09:44

## 2022-03-15 ENCOUNTER — APPOINTMENT (OUTPATIENT)
Dept: CARDIOLOGY | Facility: CLINIC | Age: 77
End: 2022-03-15

## 2022-03-16 ENCOUNTER — APPOINTMENT (OUTPATIENT)
Dept: VASCULAR SURGERY | Facility: CLINIC | Age: 77
End: 2022-03-16
Payer: MEDICARE

## 2022-03-16 VITALS
HEART RATE: 67 BPM | TEMPERATURE: 98 F | SYSTOLIC BLOOD PRESSURE: 152 MMHG | BODY MASS INDEX: 29.25 KG/M2 | DIASTOLIC BLOOD PRESSURE: 86 MMHG | WEIGHT: 193 LBS | HEIGHT: 68 IN

## 2022-03-16 PROCEDURE — 93978 VASCULAR STUDY: CPT

## 2022-03-16 PROCEDURE — 99214 OFFICE O/P EST MOD 30 MIN: CPT

## 2022-03-16 NOTE — HISTORY OF PRESENT ILLNESS
[FreeTextEntry1] :   \par 74 y/o gentleman with AAA presents for routine follow up.\par \par The patient presents for a 3 month followup visit.His last aortic duplex showed AAA to 4.6 cm, CT scan from March 2021 reviewed that showed AAA at 4.1 cm .\par Today he presents for an aortic duplex to evaluate his infrarenal aortic aneurysm size. He denies any abdominal or back pain.

## 2022-03-21 ENCOUNTER — APPOINTMENT (OUTPATIENT)
Dept: INFUSION THERAPY | Facility: CLINIC | Age: 77
End: 2022-03-21

## 2022-03-21 ENCOUNTER — APPOINTMENT (OUTPATIENT)
Dept: UROLOGY | Facility: CLINIC | Age: 77
End: 2022-03-21
Payer: MEDICARE

## 2022-03-21 ENCOUNTER — LABORATORY RESULT (OUTPATIENT)
Age: 77
End: 2022-03-21

## 2022-03-21 VITALS
RESPIRATION RATE: 16 BRPM | TEMPERATURE: 98.2 F | SYSTOLIC BLOOD PRESSURE: 152 MMHG | DIASTOLIC BLOOD PRESSURE: 79 MMHG | HEART RATE: 70 BPM

## 2022-03-21 VITALS — BODY MASS INDEX: 28.64 KG/M2 | HEIGHT: 68 IN | WEIGHT: 189 LBS

## 2022-03-21 DIAGNOSIS — R31.29 OTHER MICROSCOPIC HEMATURIA: ICD-10-CM

## 2022-03-21 PROCEDURE — 99204 OFFICE O/P NEW MOD 45 MIN: CPT

## 2022-03-21 RX ORDER — IRON SUCROSE 20 MG/ML
200 INJECTION, SOLUTION INTRAVENOUS ONCE
Refills: 0 | Status: COMPLETED | OUTPATIENT
Start: 2022-03-21 | End: 2022-03-21

## 2022-03-21 RX ADMIN — IRON SUCROSE 200 MILLIGRAM(S): 20 INJECTION, SOLUTION INTRAVENOUS at 10:30

## 2022-03-21 RX ADMIN — IRON SUCROSE 220 MILLIGRAM(S): 20 INJECTION, SOLUTION INTRAVENOUS at 09:57

## 2022-03-21 NOTE — HISTORY OF PRESENT ILLNESS
[FreeTextEntry1] : 77 yo with microhematuria the patient was referred for an assessment\par he had 1 urine specimen that contained 7 RBCs per HPF on 2/24/22\par \par PSA 1.22 ng/ml 2/22\par \par CT scan 3/21\par \par ADRENAL GLANDS: Unremarkable.\par \par KIDNEYS: No hydronephrosis bilaterally. Punctate right mid pole 0.3 cm nonobstructing calculus (series 4 image 145). Right mid pole 1.6 cm cyst..\par \par ABDOMINOPELVIC NODES: Unremarkable.\par \par PELVIC ORGANS: Unremarkable.\par \par he is being followed by Dr. Ledesma for iron deficiency anemia\par \par he denies gross hematuria\par he is an active smoker\par he does have a history of nephrolithiasis \par \par IPSS 17\par IIEF   11\par \par \par \par  [Urinary Urgency] : urinary urgency [Urinary Frequency] : urinary frequency [Nocturia] : nocturia [Weak Stream] : weak stream [Intermittency] : intermittency [Hematuria - Gross] : no gross hematuria [Hematuria - Microscopic] : microscopic hematuria

## 2022-03-21 NOTE — ASSESSMENT
[FreeTextEntry1] : 77 yo with microhematuria, history of iron deficiency anemia\par denies gross hematuria\par smoker  - bilateral nephrolithiasis\par \par - Renal and Bladder US\par - UA, CUlture, Cytology\par - Cystoscopy in 2 weeks\par \par the plan of care was clearly outlined\par all questions answered

## 2022-03-21 NOTE — LETTER BODY
[Dear  ___] : Dear  [unfilled], [Consult Letter:] : I had the pleasure of evaluating your patient, [unfilled]. [Please see my note below.] : Please see my note below. [Sincerely,] : Sincerely, [FreeTextEntry3] : Solitario Nation MD, FACS\par

## 2022-03-22 ENCOUNTER — LABORATORY RESULT (OUTPATIENT)
Age: 77
End: 2022-03-22

## 2022-03-22 LAB
APPEARANCE: ABNORMAL
BILIRUBIN URINE: NEGATIVE
BLOOD URINE: ABNORMAL
COLOR: YELLOW
GLUCOSE QUALITATIVE U: NEGATIVE
KETONES URINE: NEGATIVE
LEUKOCYTE ESTERASE URINE: NEGATIVE
NITRITE URINE: NEGATIVE
PH URINE: 6
PROTEIN URINE: ABNORMAL
SPECIFIC GRAVITY URINE: 1.02
UROBILINOGEN URINE: NORMAL

## 2022-03-24 LAB — BACTERIA UR CULT: NORMAL

## 2022-03-28 ENCOUNTER — APPOINTMENT (OUTPATIENT)
Dept: INFUSION THERAPY | Facility: CLINIC | Age: 77
End: 2022-03-28

## 2022-03-28 RX ORDER — IRON SUCROSE 20 MG/ML
200 INJECTION, SOLUTION INTRAVENOUS ONCE
Refills: 0 | Status: COMPLETED | OUTPATIENT
Start: 2022-03-28 | End: 2022-03-28

## 2022-03-28 RX ADMIN — IRON SUCROSE 220 MILLIGRAM(S): 20 INJECTION, SOLUTION INTRAVENOUS at 09:49

## 2022-04-11 ENCOUNTER — APPOINTMENT (OUTPATIENT)
Dept: UROLOGY | Facility: CLINIC | Age: 77
End: 2022-04-11
Payer: MEDICARE

## 2022-04-11 ENCOUNTER — RESULT REVIEW (OUTPATIENT)
Age: 77
End: 2022-04-11

## 2022-04-11 LAB
BILIRUB UR QL STRIP: NORMAL
COLLECTION METHOD: NORMAL
GLUCOSE UR-MCNC: NORMAL
HCG UR QL: 0.2 EU/DL
HGB UR QL STRIP.AUTO: NORMAL
KETONES UR-MCNC: NORMAL
LEUKOCYTE ESTERASE UR QL STRIP: NORMAL
NITRITE UR QL STRIP: NORMAL
PH UR STRIP: 5.5
PROT UR STRIP-MCNC: NORMAL
SP GR UR STRIP: 1.02

## 2022-04-11 PROCEDURE — 52000 CYSTOURETHROSCOPY: CPT

## 2022-04-11 PROCEDURE — 81003 URINALYSIS AUTO W/O SCOPE: CPT | Mod: QW

## 2022-04-14 ENCOUNTER — OUTPATIENT (OUTPATIENT)
Dept: OUTPATIENT SERVICES | Facility: HOSPITAL | Age: 77
LOS: 1 days | Discharge: HOME | End: 2022-04-14
Payer: MEDICARE

## 2022-04-14 DIAGNOSIS — R31.29 OTHER MICROSCOPIC HEMATURIA: ICD-10-CM

## 2022-04-14 PROCEDURE — 76770 US EXAM ABDO BACK WALL COMP: CPT | Mod: 26

## 2022-04-27 ENCOUNTER — APPOINTMENT (OUTPATIENT)
Dept: UROLOGY | Facility: CLINIC | Age: 77
End: 2022-04-27
Payer: MEDICARE

## 2022-04-27 ENCOUNTER — APPOINTMENT (OUTPATIENT)
Dept: UROLOGY | Facility: CLINIC | Age: 77
End: 2022-04-27

## 2022-04-27 DIAGNOSIS — R31.29 OTHER MICROSCOPIC HEMATURIA: ICD-10-CM

## 2022-04-27 PROCEDURE — 99214 OFFICE O/P EST MOD 30 MIN: CPT | Mod: 95

## 2022-05-01 PROBLEM — R31.29 MICROSCOPIC HEMATURIA: Status: ACTIVE | Noted: 2022-03-21

## 2022-05-01 NOTE — ASSESSMENT
[FreeTextEntry1] : 77 yo with microhematuria\par normal cystoscopy\par normal imaging\par no abnormalities on urine studies \par \par - f/u in 6 months\par - all questions answered

## 2022-05-01 NOTE — HISTORY OF PRESENT ILLNESS
[FreeTextEntry1] : 75 yo with microhematuria the patient was referred for an assessment\par he had 1 urine specimen that contained 7 RBCs per HPF on 2/24/22\par \par PSA 1.22 ng/ml 2/22\par \par CT scan 3/21\par \par ADRENAL GLANDS: Unremarkable.\par \par KIDNEYS: No hydronephrosis bilaterally. Punctate right mid pole 0.3 cm nonobstructing calculus (series 4 image 145). Right mid pole 1.6 cm cyst..\par \par ABDOMINOPELVIC NODES: Unremarkable.\par \par PELVIC ORGANS: Unremarkable.\par \par he is being followed by Dr. Ledesma for iron deficiency anemia\par \par he denies gross hematuria\par he is an active smoker\par he does have a history of nephrolithiasis \par \par IPSS 17\par IIEF   11\par \par cystoscopy performed 4/11/2022\par no abnormal findings\par \par scheduled to review his imaging today renal and bladder US \par \par [facility] 4/14/22\par right midpole 2 cm cyst\par partially duplicated right collecting system \par left - no abnormalities \par prostate 35 grams\par \par \par

## 2022-05-02 ENCOUNTER — LABORATORY RESULT (OUTPATIENT)
Age: 77
End: 2022-05-02

## 2022-05-02 ENCOUNTER — APPOINTMENT (OUTPATIENT)
Dept: HEMATOLOGY ONCOLOGY | Facility: CLINIC | Age: 77
End: 2022-05-02

## 2022-05-03 LAB
FERRITIN SERPL-MCNC: 142 NG/ML
HCT VFR BLD CALC: 48.9 %
HGB BLD-MCNC: 16.2 G/DL
IRON SATN MFR SERPL: 20 %
IRON SERPL-MCNC: 64 UG/DL
MCHC RBC-ENTMCNC: 30.2 PG
MCHC RBC-ENTMCNC: 33.1 G/DL
MCV RBC AUTO: 91.2 FL
PLATELET # BLD AUTO: 186 K/UL
PMV BLD: 9.9 FL
RBC # BLD: 5.36 M/UL
RBC # FLD: 14.8 %
TIBC SERPL-MCNC: 317 UG/DL
UIBC SERPL-MCNC: 253 UG/DL
WBC # FLD AUTO: 5.89 K/UL

## 2022-05-06 ENCOUNTER — OUTPATIENT (OUTPATIENT)
Dept: OUTPATIENT SERVICES | Facility: HOSPITAL | Age: 77
LOS: 1 days | Discharge: HOME | End: 2022-05-06

## 2022-05-06 ENCOUNTER — APPOINTMENT (OUTPATIENT)
Dept: HEMATOLOGY ONCOLOGY | Facility: CLINIC | Age: 77
End: 2022-05-06
Payer: MEDICARE

## 2022-05-06 DIAGNOSIS — D64.9 ANEMIA, UNSPECIFIED: ICD-10-CM

## 2022-05-06 PROCEDURE — 99213 OFFICE O/P EST LOW 20 MIN: CPT | Mod: 95

## 2022-05-06 NOTE — CONSULT LETTER
[Dear  ___] : Dear  [unfilled], [Consult Letter:] : I had the pleasure of evaluating your patient, [unfilled]. [Please see my note below.] : Please see my note below. [Consult Closing:] : Thank you very much for allowing me to participate in the care of this patient.  If you have any questions, please do not hesitate to contact me. [Sincerely,] : Sincerely, [FreeTextEntry2] : Jadon Villalba MD [FreeTextEntry3] : Leo Ledesma DO\par Attending Physician,\par Hematology/ Medical Oncology\par [phone] office\par \par

## 2022-05-06 NOTE — ASSESSMENT
[FreeTextEntry1] : 77 yo M with PMHx AAA undergoing observation with vascular surgery presents to clinic for evaluation of anemia. Review of blood work shows that patient has a history of progressive anemia. Hemoglobin in 2016 was 13.9 with MCV of 86.9. Since then, he has had gradual onset of microcytic anemia with progressively decreasing hemoglobin levels (starting from October 2019). \par \par s/p venofer 1000mg completed March 2021 with full resoultion of anemia and iron deficiency; however, again low iron stores with decreasing HG. I recommend to restart iron IV;good resolution of iron stores after the iron replacement\par \par  moreover, strongly recommend GI w/u to r/o bleeding , which was not done yet\par \par \par

## 2022-05-06 NOTE — REASON FOR VISIT
[Follow-Up Visit] : a follow-up visit for [Home] : at home, [unfilled] , at the time of the visit. [Medical Office: ([facility])___] : at the medical office located in  [Verbal consent obtained from patient] : the patient, [unfilled]

## 2022-05-06 NOTE — REVIEW OF SYSTEMS
[Fever] : no fever [Chills] : no chills [Night Sweats] : no night sweats [Fatigue] : no fatigue [Eye Pain] : no eye pain [Dysphagia] : no dysphagia [Chest Pain] : no chest pain [Palpitations] : no palpitations [Lower Ext Edema] : no lower extremity edema [Shortness Of Breath] : no shortness of breath [Wheezing] : no wheezing [Cough] : cough [Abdominal Pain] : no abdominal pain [Vomiting] : no vomiting [Constipation] : no constipation [Diarrhea] : no diarrhea [Dysuria] : no dysuria [Joint Pain] : no joint pain [Muscle Pain] : no muscle pain [Skin Rash] : no skin rash

## 2022-05-06 NOTE — REASON FOR VISIT
[Follow-Up Visit] : a follow-up visit for [Home] : at home, [unfilled] , at the time of the visit. [Medical Office: ([facility])___] : at the medical office located in  [Verbal consent obtained from patient] : the patient, [unfilled]

## 2022-05-06 NOTE — ASSESSMENT
[FreeTextEntry1] : 75 yo M with PMHx AAA undergoing observation with vascular surgery presents to clinic for evaluation of anemia. Review of blood work shows that patient has a history of progressive anemia. Hemoglobin in 2016 was 13.9 with MCV of 86.9. Since then, he has had gradual onset of microcytic anemia with progressively decreasing hemoglobin levels (starting from October 2019). \par \par s/p venofer 1000mg completed March 2021 with full resoultion of anemia and iron deficiency; however, again low iron stores with decreasing HG. I recommend to restart iron IV;good resolution of iron stores after the iron replacement\par \par  moreover, strongly recommend GI w/u to r/o bleeding , which was not done yet\par \par \par

## 2022-05-06 NOTE — HISTORY OF PRESENT ILLNESS
[de-identified] : Patient is a 76 yo M with PMHx AAA undergoing observation with vascular surgery presents to clinic for evaluation of anemia. Review of blood work shows that patient has a history of progressive anemia. Hemoglobin in 2016 was 13.9 with MCV of 86.9. Since then, he has had gradual onset of microcytic anemia with progressively decreasing hemoglobin levels (starting from October 2019). Most recent blood work done 1 week ago shows a Hb of 9.9, HCt 34.3, and MCV 78.3. Iron studies were done in February 2020 and showed Iron 24, TIBC 423, and Ferritin 11. Patient otherwise feels well. He denies any fatigue, shortness of breath, chest pain, abdominal pain, or blood in his stools. His last colonoscopy and endoscopy was 1 year ago with Dr. Erazo and reportedly was normal. He says he was told he could follow in 5 years for possible re-scoping. He continues to work and is fully functional at baseline.  [de-identified] : \par 4/30/21\par feels well, no sx\par f/u 4 mo and repeat labs\par endoscopy 2/21\par colonoscopy 2 yrs ago - polyps\par f/u Dr Erazo\par

## 2022-05-06 NOTE — CONSULT LETTER
[Dear  ___] : Dear  [unfilled], [Consult Letter:] : I had the pleasure of evaluating your patient, [unfilled]. [Please see my note below.] : Please see my note below. [Consult Closing:] : Thank you very much for allowing me to participate in the care of this patient.  If you have any questions, please do not hesitate to contact me. [Sincerely,] : Sincerely, [FreeTextEntry2] : Jadon Villalba MD [FreeTextEntry3] : Leo Ledesma DO\par Attending Physician,\par Hematology/ Medical Oncology\par [phone] office\par \par

## 2022-05-06 NOTE — HISTORY OF PRESENT ILLNESS
[de-identified] : Patient is a 74 yo M with PMHx AAA undergoing observation with vascular surgery presents to clinic for evaluation of anemia. Review of blood work shows that patient has a history of progressive anemia. Hemoglobin in 2016 was 13.9 with MCV of 86.9. Since then, he has had gradual onset of microcytic anemia with progressively decreasing hemoglobin levels (starting from October 2019). Most recent blood work done 1 week ago shows a Hb of 9.9, HCt 34.3, and MCV 78.3. Iron studies were done in February 2020 and showed Iron 24, TIBC 423, and Ferritin 11. Patient otherwise feels well. He denies any fatigue, shortness of breath, chest pain, abdominal pain, or blood in his stools. His last colonoscopy and endoscopy was 1 year ago with Dr. Erazo and reportedly was normal. He says he was told he could follow in 5 years for possible re-scoping. He continues to work and is fully functional at baseline.  [de-identified] : \par 4/30/21\par feels well, no sx\par f/u 4 mo and repeat labs\par endoscopy 2/21\par colonoscopy 2 yrs ago - polyps\par f/u Dr Erazo\par

## 2022-07-06 ENCOUNTER — APPOINTMENT (OUTPATIENT)
Dept: VASCULAR SURGERY | Facility: CLINIC | Age: 77
End: 2022-07-06

## 2022-07-06 VITALS
BODY MASS INDEX: 28.64 KG/M2 | SYSTOLIC BLOOD PRESSURE: 150 MMHG | WEIGHT: 189 LBS | HEIGHT: 68 IN | DIASTOLIC BLOOD PRESSURE: 80 MMHG

## 2022-07-06 PROCEDURE — 93978 VASCULAR STUDY: CPT

## 2022-08-08 ENCOUNTER — APPOINTMENT (OUTPATIENT)
Dept: HEMATOLOGY ONCOLOGY | Facility: CLINIC | Age: 77
End: 2022-08-08

## 2022-08-22 ENCOUNTER — LABORATORY RESULT (OUTPATIENT)
Age: 77
End: 2022-08-22

## 2022-08-22 ENCOUNTER — APPOINTMENT (OUTPATIENT)
Dept: HEMATOLOGY ONCOLOGY | Facility: CLINIC | Age: 77
End: 2022-08-22

## 2022-08-22 LAB
HCT VFR BLD CALC: 46.2 %
HGB BLD-MCNC: 15.2 G/DL
MCHC RBC-ENTMCNC: 29.7 PG
MCHC RBC-ENTMCNC: 32.9 G/DL
MCV RBC AUTO: 90.2 FL
PLATELET # BLD AUTO: 199 K/UL
PMV BLD: 9.3 FL
RBC # BLD: 5.12 M/UL
RBC # FLD: 13.9 %
WBC # FLD AUTO: 6.35 K/UL

## 2022-08-23 LAB
ALBUMIN SERPL ELPH-MCNC: 4.4 G/DL
ALP BLD-CCNC: 74 U/L
ALT SERPL-CCNC: 10 U/L
ANION GAP SERPL CALC-SCNC: 9 MMOL/L
AST SERPL-CCNC: 12 U/L
BILIRUB DIRECT SERPL-MCNC: <0.2 MG/DL
BILIRUB INDIRECT SERPL-MCNC: >0.2 MG/DL
BILIRUB SERPL-MCNC: 0.4 MG/DL
BUN SERPL-MCNC: 14 MG/DL
CALCIUM SERPL-MCNC: 8.9 MG/DL
CHLORIDE SERPL-SCNC: 108 MMOL/L
CO2 SERPL-SCNC: 26 MMOL/L
CREAT SERPL-MCNC: 1.1 MG/DL
EGFR: 70 ML/MIN/1.73M2
FERRITIN SERPL-MCNC: 109 NG/ML
GLUCOSE SERPL-MCNC: 92 MG/DL
IRON SATN MFR SERPL: 29 %
IRON SERPL-MCNC: 74 UG/DL
POTASSIUM SERPL-SCNC: 4.9 MMOL/L
PROT SERPL-MCNC: 7.3 G/DL
SODIUM SERPL-SCNC: 143 MMOL/L
TIBC SERPL-MCNC: 258 UG/DL
UIBC SERPL-MCNC: 184 UG/DL

## 2022-08-25 ENCOUNTER — APPOINTMENT (OUTPATIENT)
Dept: HEMATOLOGY ONCOLOGY | Facility: CLINIC | Age: 77
End: 2022-08-25

## 2022-08-25 ENCOUNTER — OUTPATIENT (OUTPATIENT)
Dept: OUTPATIENT SERVICES | Facility: HOSPITAL | Age: 77
LOS: 1 days | Discharge: HOME | End: 2022-08-25

## 2022-08-25 DIAGNOSIS — D64.9 ANEMIA, UNSPECIFIED: ICD-10-CM

## 2022-08-25 PROCEDURE — 99213 OFFICE O/P EST LOW 20 MIN: CPT | Mod: 95

## 2022-08-25 NOTE — CONSULT LETTER
[Dear  ___] : Dear  [unfilled], [Consult Letter:] : I had the pleasure of evaluating your patient, [unfilled]. [Please see my note below.] : Please see my note below. [Consult Closing:] : Thank you very much for allowing me to participate in the care of this patient.  If you have any questions, please do not hesitate to contact me. [Sincerely,] : Sincerely, [FreeTextEntry2] : Jadon Villalba MD [FreeTextEntry3] : Leo Ledesma DO\par Attending Physician,\par Hematology/ Medical Oncology\par [phone] office\par \par

## 2022-08-25 NOTE — HISTORY OF PRESENT ILLNESS
[de-identified] : \par 4/30/21\par feels well, no sx\par f/u 4 mo and repeat labs\par endoscopy 2/21\par colonoscopy 2 yrs ago - polyps\par f/u Dr Erazo\par  [de-identified] : Patient is a 76 yo M with PMHx AAA undergoing observation with vascular surgery presents to clinic for evaluation of anemia. Review of blood work shows that patient has a history of progressive anemia. Hemoglobin in 2016 was 13.9 with MCV of 86.9. Since then, he has had gradual onset of microcytic anemia with progressively decreasing hemoglobin levels (starting from October 2019). Most recent blood work done 1 week ago shows a Hb of 9.9, HCt 34.3, and MCV 78.3. Iron studies were done in February 2020 and showed Iron 24, TIBC 423, and Ferritin 11. Patient otherwise feels well. He denies any fatigue, shortness of breath, chest pain, abdominal pain, or blood in his stools. His last colonoscopy and endoscopy was 1 year ago with Dr. Erazo and reportedly was normal. He says he was told he could follow in 5 years for possible re-scoping. He continues to work and is fully functional at baseline.

## 2022-08-25 NOTE — REASON FOR VISIT
[Follow-Up Visit] : a follow-up visit for [Home] : at home, [unfilled] , at the time of the visit. [Medical Office: ([facility])___] : at the medical office located in  [Verbal consent obtained from patient] : the patient, [unfilled]

## 2022-08-25 NOTE — ASSESSMENT
[FreeTextEntry1] : 75 yo M with PMHx AAA undergoing observation with vascular surgery presents to clinic for evaluation of anemia. Review of blood work shows that patient has a history of progressive anemia. Hemoglobin in 2016 was 13.9 with MCV of 86.9. Since then, he has had gradual onset of microcytic anemia with progressively decreasing hemoglobin levels (starting from October 2019). \par \par s/p venofer 1000mg completed March 2021 with full resoultion of anemia and iron deficiency; however, again low iron stores with decreasing HG. I recommend to restart iron IV;good resolution of iron stores after the iron replacement\par \par  moreover, strongly recommend GI w/u to r/o bleeding , which was not done yet\par \par as of 8/25/22, no evidence of anemia or iron deficiency\par f/u given\par \par \par

## 2022-08-26 DIAGNOSIS — D64.9 ANEMIA, UNSPECIFIED: ICD-10-CM

## 2022-08-26 LAB — METHYLMALONATE SERPL-SCNC: 156 NMOL/L

## 2022-09-01 NOTE — PRE-ANESTHESIA EVALUATION ADULT - NSANTHAGERD_ENT_A_CORE
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 She is s/p surgical stating on 1/20/22 and received adjuvant chemotherapy, completed 6 cycles 6/9/22.  Restaging CT C/A/P 7/17/22 showed no clear evidence of recurrence/progression. Adjuvant vaginal cuff brachytherapy was recommended. The patient underwent a course of radiation therapy as outlined below. \par \par Radiation Treatment Summary: Vaginal Cuff  2,100 cGy  Ir-192 from 7/26/2022 to 8/02/2022  3 fx\par \par Today: Feels generally well. Notes increased frequency of soft BMs up to 3 x day. Denies abdominal pain, vaginal discharge/bleeding, dysuria. Not sexually active. Dilator size #1 & 2 given with instructions for use. Has a f/u appt. with Dr. Lopez 9/16/22.\par \par \par \par 
Yes
Yes

## 2022-11-28 ENCOUNTER — APPOINTMENT (OUTPATIENT)
Dept: HEMATOLOGY ONCOLOGY | Facility: CLINIC | Age: 77
End: 2022-11-28

## 2022-11-29 LAB
ANION GAP SERPL CALC-SCNC: 10 MMOL/L
BASOPHILS # BLD AUTO: 0.05 K/UL
BASOPHILS NFR BLD AUTO: 0.7 %
BUN SERPL-MCNC: 15 MG/DL
CALCIUM SERPL-MCNC: 9 MG/DL
CHLORIDE SERPL-SCNC: 106 MMOL/L
CO2 SERPL-SCNC: 26 MMOL/L
CREAT SERPL-MCNC: 1.1 MG/DL
EGFR: 70 ML/MIN/1.73M2
EOSINOPHIL # BLD AUTO: 0.23 K/UL
EOSINOPHIL NFR BLD AUTO: 3.1 %
FERRITIN SERPL-MCNC: 63 NG/ML
GLUCOSE SERPL-MCNC: 85 MG/DL
HCT VFR BLD CALC: 45.5 %
HGB BLD-MCNC: 14.9 G/DL
IMM GRANULOCYTES NFR BLD AUTO: 0.5 %
IRON SATN MFR SERPL: 31 %
IRON SERPL-MCNC: 90 UG/DL
LYMPHOCYTES # BLD AUTO: 2.39 K/UL
LYMPHOCYTES NFR BLD AUTO: 32.3 %
MAN DIFF?: NORMAL
MCHC RBC-ENTMCNC: 30 PG
MCHC RBC-ENTMCNC: 32.7 G/DL
MCV RBC AUTO: 91.5 FL
MONOCYTES # BLD AUTO: 0.48 K/UL
MONOCYTES NFR BLD AUTO: 6.5 %
NEUTROPHILS # BLD AUTO: 4.21 K/UL
NEUTROPHILS NFR BLD AUTO: 56.9 %
PLATELET # BLD AUTO: 224 K/UL
POTASSIUM SERPL-SCNC: 4.8 MMOL/L
RBC # BLD: 4.97 M/UL
RBC # FLD: 14.2 %
SODIUM SERPL-SCNC: 142 MMOL/L
TIBC SERPL-MCNC: 291 UG/DL
UIBC SERPL-MCNC: 201 UG/DL
WBC # FLD AUTO: 7.4 K/UL

## 2022-12-01 ENCOUNTER — APPOINTMENT (OUTPATIENT)
Dept: HEMATOLOGY ONCOLOGY | Facility: CLINIC | Age: 77
End: 2022-12-01

## 2022-12-01 ENCOUNTER — OUTPATIENT (OUTPATIENT)
Dept: OUTPATIENT SERVICES | Facility: HOSPITAL | Age: 77
LOS: 1 days | End: 2022-12-01

## 2022-12-01 DIAGNOSIS — D50.9 IRON DEFICIENCY ANEMIA, UNSPECIFIED: ICD-10-CM

## 2022-12-01 PROCEDURE — 99213 OFFICE O/P EST LOW 20 MIN: CPT | Mod: 95

## 2022-12-01 NOTE — CONSULT LETTER
[Dear  ___] : Dear  [unfilled], [Consult Letter:] : I had the pleasure of evaluating your patient, [unfilled]. [Please see my note below.] : Please see my note below. [Consult Closing:] : Thank you very much for allowing me to participate in the care of this patient.  If you have any questions, please do not hesitate to contact me. [Sincerely,] : Sincerely, [FreeTextEntry2] : Jadon Villalba MD [FreeTextEntry3] : Leo Ledesma DO\par Attending Physician,\par Hematology/ Medical Oncology\par [phone] office\par \par

## 2022-12-01 NOTE — HISTORY OF PRESENT ILLNESS
[de-identified] : Patient is a 76 yo M with PMHx AAA undergoing observation with vascular surgery presents to clinic for evaluation of anemia. Review of blood work shows that patient has a history of progressive anemia. Hemoglobin in 2016 was 13.9 with MCV of 86.9. Since then, he has had gradual onset of microcytic anemia with progressively decreasing hemoglobin levels (starting from October 2019). Most recent blood work done 1 week ago shows a Hb of 9.9, HCt 34.3, and MCV 78.3. Iron studies were done in February 2020 and showed Iron 24, TIBC 423, and Ferritin 11. Patient otherwise feels well. He denies any fatigue, shortness of breath, chest pain, abdominal pain, or blood in his stools. His last colonoscopy and endoscopy was 1 year ago with Dr. Erazo and reportedly was normal. He says he was told he could follow in 5 years for possible re-scoping. He continues to work and is fully functional at baseline.  [de-identified] : \par 4/30/21\par feels well, no sx\par f/u 4 mo and repeat labs\par endoscopy 2/21\par colonoscopy 2 yrs ago - polyps\par f/u Dr Erazo\par

## 2022-12-01 NOTE — ASSESSMENT
[FreeTextEntry1] : 78 yo M with PMHx AAA undergoing observation with vascular surgery presents to clinic for evaluation of anemia. Review of blood work shows that patient has a history of progressive anemia. Hemoglobin in 2016 was 13.9 with MCV of 86.9. Since then, he has had gradual onset of microcytic anemia with progressively decreasing hemoglobin levels (starting from October 2019). \par \par s/p venofer 1000mg completed March 2021 with full resoultion of anemia and iron deficiency; however, again low iron stores with decreasing HG. I recommend to restart iron IV;good resolution of iron stores after the iron replacement\par \par  moreover, strongly recommend GI w/u to r/o bleeding , which was not done yet\par \par as of 11/2022, no evidence of anemia or iron deficiency\par f/u given\par \par \par

## 2022-12-01 NOTE — REASON FOR VISIT
[Follow-Up Visit] : a follow-up visit for [Home] : at home, [unfilled] , at the time of the visit. [Medical Office: ([facility])___] : at the medical office located in  [Verbal consent obtained from patient] : the patient, [unfilled]

## 2022-12-02 LAB — METHYLMALONATE SERPL-SCNC: 114 NMOL/L

## 2022-12-14 ENCOUNTER — APPOINTMENT (OUTPATIENT)
Dept: VASCULAR SURGERY | Facility: CLINIC | Age: 77
End: 2022-12-14
Payer: MEDICARE

## 2022-12-14 VITALS
HEIGHT: 68 IN | BODY MASS INDEX: 28.19 KG/M2 | DIASTOLIC BLOOD PRESSURE: 88 MMHG | WEIGHT: 186 LBS | SYSTOLIC BLOOD PRESSURE: 139 MMHG

## 2022-12-14 PROCEDURE — 99214 OFFICE O/P EST MOD 30 MIN: CPT

## 2022-12-14 PROCEDURE — 93978 VASCULAR STUDY: CPT

## 2022-12-14 NOTE — DATA REVIEWED
[FreeTextEntry1] : Aortic duplex showed a 4.7 cm infrarenal abdominal arctic aneurysm with an aortic dissection.

## 2022-12-14 NOTE — HISTORY OF PRESENT ILLNESS
[FreeTextEntry1] :   \par 78 y/o gentleman with AAA presents for routine follow up.\par \par The patient presents for a 4 month followup visit.His last aortic duplex showed AAA to 4.6 cm, CT scan from March 2021 reviewed that showed AAA at 4.1 cm .\par Today he presents for an aortic duplex to evaluate his infrarenal aortic aneurysm size. He denies any abdominal or back pain.

## 2023-02-27 ENCOUNTER — APPOINTMENT (OUTPATIENT)
Dept: HEMATOLOGY ONCOLOGY | Facility: CLINIC | Age: 78
End: 2023-02-27

## 2023-03-01 ENCOUNTER — APPOINTMENT (OUTPATIENT)
Dept: HEMATOLOGY ONCOLOGY | Facility: CLINIC | Age: 78
End: 2023-03-01

## 2023-03-01 ENCOUNTER — LABORATORY RESULT (OUTPATIENT)
Age: 78
End: 2023-03-01

## 2023-03-01 ENCOUNTER — OUTPATIENT (OUTPATIENT)
Dept: OUTPATIENT SERVICES | Facility: HOSPITAL | Age: 78
LOS: 1 days | End: 2023-03-01
Payer: MEDICARE

## 2023-03-01 DIAGNOSIS — D64.9 ANEMIA, UNSPECIFIED: ICD-10-CM

## 2023-03-01 LAB
ALBUMIN SERPL ELPH-MCNC: 4.1 G/DL
ALP BLD-CCNC: 68 U/L
ALT SERPL-CCNC: 8 U/L
ANION GAP SERPL CALC-SCNC: 9 MMOL/L
AST SERPL-CCNC: 12 U/L
BILIRUB DIRECT SERPL-MCNC: <0.2 MG/DL
BILIRUB INDIRECT SERPL-MCNC: >0.1 MG/DL
BILIRUB SERPL-MCNC: 0.3 MG/DL
BUN SERPL-MCNC: 19 MG/DL
CALCIUM SERPL-MCNC: 8.7 MG/DL
CHLORIDE SERPL-SCNC: 105 MMOL/L
CO2 SERPL-SCNC: 27 MMOL/L
CREAT SERPL-MCNC: 1.1 MG/DL
EGFR: 70 ML/MIN/1.73M2
GLUCOSE SERPL-MCNC: 107 MG/DL
HCT VFR BLD CALC: 43.1 %
HGB BLD-MCNC: 14.1 G/DL
IRON SATN MFR SERPL: 26 %
IRON SERPL-MCNC: 73 UG/DL
MCHC RBC-ENTMCNC: 29 PG
MCHC RBC-ENTMCNC: 32.7 G/DL
MCV RBC AUTO: 88.7 FL
PLATELET # BLD AUTO: 220 K/UL
PMV BLD: 9.8 FL
POTASSIUM SERPL-SCNC: 4.6 MMOL/L
PROT SERPL-MCNC: 7.2 G/DL
RBC # BLD: 4.86 M/UL
RBC # FLD: 14.2 %
SODIUM SERPL-SCNC: 141 MMOL/L
TIBC SERPL-MCNC: 286 UG/DL
UIBC SERPL-MCNC: 213 UG/DL
WBC # FLD AUTO: 6.57 K/UL

## 2023-03-01 PROCEDURE — 83540 ASSAY OF IRON: CPT

## 2023-03-01 PROCEDURE — 80076 HEPATIC FUNCTION PANEL: CPT

## 2023-03-01 PROCEDURE — 85027 COMPLETE CBC AUTOMATED: CPT

## 2023-03-01 PROCEDURE — 82728 ASSAY OF FERRITIN: CPT

## 2023-03-01 PROCEDURE — 36415 COLL VENOUS BLD VENIPUNCTURE: CPT

## 2023-03-01 PROCEDURE — 83550 IRON BINDING TEST: CPT

## 2023-03-01 PROCEDURE — 80048 BASIC METABOLIC PNL TOTAL CA: CPT

## 2023-03-02 DIAGNOSIS — D64.9 ANEMIA, UNSPECIFIED: ICD-10-CM

## 2023-03-02 LAB — FERRITIN SERPL-MCNC: 40 NG/ML

## 2023-03-09 ENCOUNTER — APPOINTMENT (OUTPATIENT)
Dept: HEMATOLOGY ONCOLOGY | Facility: CLINIC | Age: 78
End: 2023-03-09

## 2023-03-09 ENCOUNTER — OUTPATIENT (OUTPATIENT)
Dept: OUTPATIENT SERVICES | Facility: HOSPITAL | Age: 78
LOS: 1 days | End: 2023-03-09
Payer: MEDICARE

## 2023-03-09 ENCOUNTER — APPOINTMENT (OUTPATIENT)
Dept: HEMATOLOGY ONCOLOGY | Facility: CLINIC | Age: 78
End: 2023-03-09
Payer: MEDICARE

## 2023-03-09 DIAGNOSIS — D64.9 ANEMIA, UNSPECIFIED: ICD-10-CM

## 2023-03-09 PROCEDURE — 99213 OFFICE O/P EST LOW 20 MIN: CPT | Mod: 95

## 2023-03-09 NOTE — REASON FOR VISIT
[Follow-Up Visit] : a follow-up visit for [Home] : at home, [unfilled] , at the time of the visit. [Medical Office: ([facility])___] : at the medical office located in  [Verbal consent obtained from patient] : the patient, [unfilled]

## 2023-03-09 NOTE — HISTORY OF PRESENT ILLNESS
[de-identified] : \par 4/30/21\par feels well, no sx\par f/u 4 mo and repeat labs\par endoscopy 2/21\par colonoscopy 2 yrs ago - polyps\par f/u Dr Erazo\par  [de-identified] : Patient is a 74 yo M with PMHx AAA undergoing observation with vascular surgery presents to clinic for evaluation of anemia. Review of blood work shows that patient has a history of progressive anemia. Hemoglobin in 2016 was 13.9 with MCV of 86.9. Since then, he has had gradual onset of microcytic anemia with progressively decreasing hemoglobin levels (starting from October 2019). Most recent blood work done 1 week ago shows a Hb of 9.9, HCt 34.3, and MCV 78.3. Iron studies were done in February 2020 and showed Iron 24, TIBC 423, and Ferritin 11. Patient otherwise feels well. He denies any fatigue, shortness of breath, chest pain, abdominal pain, or blood in his stools. His last colonoscopy and endoscopy was 1 year ago with Dr. Erazo and reportedly was normal. He says he was told he could follow in 5 years for possible re-scoping. He continues to work and is fully functional at baseline.

## 2023-03-09 NOTE — CONSULT LETTER
[Dear  ___] : Dear  [unfilled], [Consult Letter:] : I had the pleasure of evaluating your patient, [unfilled]. [Please see my note below.] : Please see my note below. [Consult Closing:] : Thank you very much for allowing me to participate in the care of this patient.  If you have any questions, please do not hesitate to contact me. [Sincerely,] : Sincerely, [FreeTextEntry2] : Jadon Villalba MD [FreeTextEntry3] : Leo Ledesma DO\par Attending Physician,\par Hematology/ Medical Oncology\par [phone] office\par \par

## 2023-03-09 NOTE — ASSESSMENT
[FreeTextEntry1] : 78 yo M with PMHx AAA undergoing observation with vascular surgery presents to clinic for evaluation of anemia. Review of blood work shows that patient has a history of progressive anemia. Hemoglobin in 2016 was 13.9 with MCV of 86.9. Since then, he has had gradual onset of microcytic anemia with progressively decreasing hemoglobin levels (starting from October 2019). \par \par s/p venofer 1000mg completed March 2021 with full resoultion of anemia and iron deficiency; however, again low iron stores with decreasing HG. I recommend to restart iron IV;good resolution of iron stores after the iron replacement\par \par  moreover, strongly recommend GI w/u to r/o bleeding , which was not done yet\par \par ferritin decreased to 40; no reason to give iv venofer yet; rtc in 3 mos\par f/u given\par \par \par

## 2023-03-10 DIAGNOSIS — D50.9 IRON DEFICIENCY ANEMIA, UNSPECIFIED: ICD-10-CM

## 2023-03-23 NOTE — PRE-ANESTHESIA EVALUATION ADULT - NSANTHAIRWAYFT_ENT_ALL_CORE
Occupational Therapy    Visit Type: treatment  Precautions:  Medical precautions:  fall risk; standard precautions.      Pt adm w/ left sided weakness, left eye vision changes, and headache. LKN 3/3 19:00.    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 Plan for OR for C3-7 lami 3/13.    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  Spine:     Cervical: soft collar on for comfort, no lifting, no bending and no rotation  Vision:     Current Vision: L eye visual deficits.    Patient Visual Report: pt report dec'd vision lower L eye field of vision.  Safety Measures: chair alarm  SUBJECTIVE  Patient agreed to participate in therapy this date.    \"Can we do that heat again? It really loosened me up.\"  Patient / Family Goal: maximize function and return home    Pain   RN informed on pain level     OBJECTIVE     Cognitive Status   Level of Consciousness   - alert  Affect/Behavior    - cooperative and pleasant  Orientation    - Oriented to: person, place, time and situation  Functional Communication   - Overall Status: within functional limits   - Forms of Communication: verbal  Following Direction   - follows one step commands with increased time and follows one step commands with repetition              Therapeutic Exercise  Patient completed table slides for shoulder flexion with heat pack applied to neck to improve AAROM for functional reach. Patient completed x10.     -Scapular Retraction: seated Bilateral: active assistive ROM  Reps: 2 Sets: 10  -Scapular depression: seated Bilateral: active assistive ROM Reps: 2 Sets: 10     Fine Motor / Coordination  Patient completed activity of picking up clothespins using 3 jaw rell pinch and reaching to place on vertical dowel radha to improve fine motor coordination and functional reach required for independence with ADLs and IADLs. Patient required 2 rest breaks during activity secondary to muscular fatigue.     Patient completed activity of using hand gym to retrieve blocks and reach to place into target bucket to improve gross grasp for independence with feeding.   Skilled input: verbal instruction/cues and tactile instruction/cues  Verbal Consent: Writer verbally educated and received verbal consent for hand placement, positioning of patient, and techniques to be performed today from patient for therapist position for techniques as described above and how they are pertinent to the patient's plan of care.         ASSESSMENT  Impairments: activity tolerance, bed mobility, balance, cardiovascular endurance, body habitus, pain, sensation, coordination/proprioception, range of motion, vision, strength and safety awareness  Functional Limitations: bed mobility, functional mobility, grooming, toileting, IADLs, community reintegration, dressing, showering, job performance, participating in meaningful/purposeful activities, functional transfers, bathing and eating   Discharge Recommendations:  Recommendations for Discharge: OT IL: Patient requires 24 HOUR assistance to perform mobility and/or ADLs safely, Patient is appropriate for Occupational Therapy 1-3 times per week (initial 24hr Assistant; HHOT)  PT/OT Mobility Equipment for Discharge: RW, possibly w/c  PT/OT ADL Equipment for Discharge: TBD  OT Identified Barriers to Discharge: pain, decreased BUE ROM, decreased functional transfers,    Progress: progressing toward goals    • Skilled therapy is required to address these limitations in attempt to  maximize the patient's independence.    Therapy Participation: This patient participated in all scheduled occupational therapy time this session.    Education:   - Present and ready to learn: patient  Education provided during session:  - -Patient educated on use of AE for LE dressing.     Patient at End of Session:   Location: in wheelchair  Safety measures: alarm system in place/re-engaged  Handoff to: rehab aide/tech    PLAN  Suggestions for next session as indicated: OT Frequency: 5 days/week, 6 days/week, 7 days/week  Frequency Comments: 45-90 min per day    ELOS: 14 days  Interventions: activity tolerance training, bed mobility training, caregiver training, ADL retraining, balance, body mechanics, cognitive retraining, community integration, fine motor coordination activities, functional transfer training, energy conservation, HEP training, modalities, IADL, neuromuscular reeducation, patient/family training, safety training, therapeutic activity, upper extremity strengthening/ROM, therapeutic exercise, positioning, patient education, stair training and transfer training  Agreement to plan and goals: patient agrees with goals and treatment plan      GOALS  Review Date: 3/24/2023  Short Term Goals (STGs): to be met 7 days from date established, unless otherwise stated.  - Patient will complete bathing at moderate assist level with adaptive equipment as deemed appropriate.  - Patient will complete upper body dressing at moderate assist level with adaptive equipment as deemed appropriate.  - Patient will complete lower body dressing at moderate assist level with adaptive equipment as deemed appropriate.  - Patient will complete toileting at moderate assist level with adaptive equipment as deemed appropriate.  - Patient will complete toilet transfer at minimal assist level with adaptive equipment as deemed appropriate.  Long Term Goals (LTGs): to be met by discharge from rehab program.  - Patient will complete  bathing at supervision level with adaptive equipment as deemed appropriate.  - Patient will complete upper body dressing at supervision level with adaptive equipment as deemed appropriate.  - Patient will complete lower body dressing at supervision level with adaptive equipment as deemed appropriate.  - Patient will complete toileting at supervision level with adaptive equipment as deemed appropriate.  - Patient will complete toilet transfer at supervision level with adaptive equipment as deemed appropriate.    Documented in the chart in the following areas: Assessment. Plan.      Therapy procedure time and total treatment time can be found documented on the Time Entry flowsheet   FROM of cervical spine   TMD 3 FB  Mouth opening 3 FB

## 2023-04-13 NOTE — PATIENT PROFILE ADULT - NSPROMUTPARTICIPCAREFT_GEN_A_NUR
n/a Purse String (Intermediate) Text: Given the location of the defect and the characteristics of the surrounding skin a purse string intermediate closure was deemed most appropriate.  Undermining was performed circumfirentially around the surgical defect.  A purse string suture was then placed and tightened.

## 2023-05-31 ENCOUNTER — APPOINTMENT (OUTPATIENT)
Dept: HEMATOLOGY ONCOLOGY | Facility: CLINIC | Age: 78
End: 2023-05-31

## 2023-05-31 ENCOUNTER — LABORATORY RESULT (OUTPATIENT)
Age: 78
End: 2023-05-31

## 2023-05-31 ENCOUNTER — OUTPATIENT (OUTPATIENT)
Dept: OUTPATIENT SERVICES | Facility: HOSPITAL | Age: 78
LOS: 1 days | End: 2023-05-31
Payer: MEDICARE

## 2023-05-31 ENCOUNTER — RESULT REVIEW (OUTPATIENT)
Age: 78
End: 2023-05-31

## 2023-05-31 DIAGNOSIS — I71.40 ABDOMINAL AORTIC ANEURYSM, WITHOUT RUPTURE, UNSPECIFIED: ICD-10-CM

## 2023-05-31 DIAGNOSIS — D64.9 ANEMIA, UNSPECIFIED: ICD-10-CM

## 2023-05-31 LAB
ALBUMIN SERPL ELPH-MCNC: 4.2 G/DL
ALP BLD-CCNC: 68 U/L
ALT SERPL-CCNC: 7 U/L
ANION GAP SERPL CALC-SCNC: 10 MMOL/L
AST SERPL-CCNC: 11 U/L
BILIRUB DIRECT SERPL-MCNC: <0.2 MG/DL
BILIRUB INDIRECT SERPL-MCNC: >0.2 MG/DL
BILIRUB SERPL-MCNC: 0.4 MG/DL
BUN SERPL-MCNC: 24 MG/DL
CALCIUM SERPL-MCNC: 9 MG/DL
CHLORIDE SERPL-SCNC: 104 MMOL/L
CO2 SERPL-SCNC: 22 MMOL/L
CREAT SERPL-MCNC: 1.2 MG/DL
EGFR: 63 ML/MIN/1.73M2
GLUCOSE SERPL-MCNC: 70 MG/DL
HCT VFR BLD CALC: 42.8 %
HGB BLD-MCNC: 14 G/DL
IRON SATN MFR SERPL: 24 %
IRON SERPL-MCNC: 69 UG/DL
MCHC RBC-ENTMCNC: 28.9 PG
MCHC RBC-ENTMCNC: 32.7 G/DL
MCV RBC AUTO: 88.4 FL
PLATELET # BLD AUTO: 215 K/UL
PMV BLD: 9.6 FL
POTASSIUM SERPL-SCNC: 5.3 MMOL/L
PROT SERPL-MCNC: 7.1 G/DL
RBC # BLD: 4.84 M/UL
RBC # FLD: 14.1 %
SODIUM SERPL-SCNC: 136 MMOL/L
TIBC SERPL-MCNC: 284 UG/DL
UIBC SERPL-MCNC: 215 UG/DL
WBC # FLD AUTO: 7.56 K/UL

## 2023-05-31 PROCEDURE — 83550 IRON BINDING TEST: CPT

## 2023-05-31 PROCEDURE — 83540 ASSAY OF IRON: CPT

## 2023-05-31 PROCEDURE — 80048 BASIC METABOLIC PNL TOTAL CA: CPT

## 2023-05-31 PROCEDURE — 85027 COMPLETE CBC AUTOMATED: CPT

## 2023-05-31 PROCEDURE — 74174 CTA ABD&PLVS W/CONTRAST: CPT | Mod: 26

## 2023-05-31 PROCEDURE — 80076 HEPATIC FUNCTION PANEL: CPT

## 2023-05-31 PROCEDURE — 82728 ASSAY OF FERRITIN: CPT

## 2023-05-31 PROCEDURE — 74174 CTA ABD&PLVS W/CONTRAST: CPT

## 2023-06-01 DIAGNOSIS — I71.40 ABDOMINAL AORTIC ANEURYSM, WITHOUT RUPTURE, UNSPECIFIED: ICD-10-CM

## 2023-06-01 DIAGNOSIS — D64.9 ANEMIA, UNSPECIFIED: ICD-10-CM

## 2023-06-02 LAB — FERRITIN SERPL-MCNC: 40 NG/ML

## 2023-06-07 ENCOUNTER — APPOINTMENT (OUTPATIENT)
Dept: VASCULAR SURGERY | Facility: CLINIC | Age: 78
End: 2023-06-07
Payer: MEDICARE

## 2023-06-07 VITALS
DIASTOLIC BLOOD PRESSURE: 92 MMHG | HEIGHT: 68 IN | SYSTOLIC BLOOD PRESSURE: 144 MMHG | BODY MASS INDEX: 28.34 KG/M2 | WEIGHT: 187 LBS | HEART RATE: 77 BPM

## 2023-06-07 PROCEDURE — 99215 OFFICE O/P EST HI 40 MIN: CPT

## 2023-06-07 NOTE — ASSESSMENT
[FreeTextEntry1] : The patient is a 77-year-old male with an expanding abdominal aortic aneurysm which 5 months ago his aorta measured 4.6 cm on aortic duplex and on CT scan today has expanded to 5.1 cm.  The patient is having rapid expansion and I would like to repair his abdominal attic aneurysm with an endovascular repair.  I have discussed this in detail with the patient.  The risks of surgery of bleeding were also discussed.  I will schedule him for July 24, 2023 for an endovascular abdominal aortic aneurysm repair.  The patient will require preop cardiac clearance with Dr. Fernandez

## 2023-06-07 NOTE — HISTORY OF PRESENT ILLNESS
[FreeTextEntry1] : The patient presents for follow up of a CTA of his abdomen. the patient has an AAA which had expended to 4.7 cn oh duplex from 4.1 cm. I had sent the patient for a CTA which shows 5.1 cm AAA.

## 2023-06-08 ENCOUNTER — OUTPATIENT (OUTPATIENT)
Dept: OUTPATIENT SERVICES | Facility: HOSPITAL | Age: 78
LOS: 1 days | End: 2023-06-08
Payer: MEDICARE

## 2023-06-08 ENCOUNTER — APPOINTMENT (OUTPATIENT)
Dept: HEMATOLOGY ONCOLOGY | Facility: CLINIC | Age: 78
End: 2023-06-08
Payer: MEDICARE

## 2023-06-08 DIAGNOSIS — D64.9 ANEMIA, UNSPECIFIED: ICD-10-CM

## 2023-06-08 PROCEDURE — 99213 OFFICE O/P EST LOW 20 MIN: CPT | Mod: 95

## 2023-06-08 NOTE — REASON FOR VISIT
[Follow-Up Visit] : a follow-up visit for [Home] : at home, [unfilled] , at the time of the visit. [Medical Office: ([facility])___] : at the medical office located in  [Verbal consent obtained from patient] : the patient, [unfilled]

## 2023-06-08 NOTE — HISTORY OF PRESENT ILLNESS
[de-identified] : Patient is a 76 yo M with PMHx AAA undergoing observation with vascular surgery presents to clinic for evaluation of anemia. Review of blood work shows that patient has a history of progressive anemia. Hemoglobin in 2016 was 13.9 with MCV of 86.9. Since then, he has had gradual onset of microcytic anemia with progressively decreasing hemoglobin levels (starting from October 2019). Most recent blood work done 1 week ago shows a Hb of 9.9, HCt 34.3, and MCV 78.3. Iron studies were done in February 2020 and showed Iron 24, TIBC 423, and Ferritin 11. Patient otherwise feels well. He denies any fatigue, shortness of breath, chest pain, abdominal pain, or blood in his stools. His last colonoscopy and endoscopy was 1 year ago with Dr. Erazo and reportedly was normal. He says he was told he could follow in 5 years for possible re-scoping. He continues to work and is fully functional at baseline.  [de-identified] : \par 4/30/21\par feels well, no sx\par f/u 4 mo and repeat labs\par endoscopy 2/21\par colonoscopy 2 yrs ago - polyps\par f/u Dr Erazo\par

## 2023-06-08 NOTE — CONSULT LETTER
[Dear  ___] : Dear  [unfilled], [Consult Letter:] : I had the pleasure of evaluating your patient, [unfilled]. [Please see my note below.] : Please see my note below. [Consult Closing:] : Thank you very much for allowing me to participate in the care of this patient.  If you have any questions, please do not hesitate to contact me. [Sincerely,] : Sincerely, [FreeTextEntry2] : Jadon Villalba MD [FreeTextEntry3] : Leo Ledesma DO\par Attending Physician,\par Hematology/ Medical Oncology\par [phone] office\par \par

## 2023-06-08 NOTE — ASSESSMENT
[FreeTextEntry1] : 76 yo M with PMHx AAA undergoing observation with vascular surgery presents to clinic for evaluation of anemia. Review of blood work shows that patient has a history of progressive anemia. Hemoglobin in 2016 was 13.9 with MCV of 86.9. Since then, he has had gradual onset of microcytic anemia with progressively decreasing hemoglobin levels (starting from October 2019). \par \par s/p venofer 1000mg completed March 2021 with full resoultion of anemia and iron deficiency; however, again low iron stores with decreasing HG. I recommend to restart iron IV;good resolution of iron stores after the iron replacement\par \par  moreover, strongly recommend GI w/u to r/o bleeding , which was not done yet\par \par ferritin decreased to 40; at this time, would keep monitoring and give venofer if ferritin is progressively worsening\par f/u given\par \par \par

## 2023-06-08 NOTE — REVIEW OF SYSTEMS
[Fever] : no fever [Chills] : no chills [Night Sweats] : no night sweats [Fatigue] : no fatigue [Eye Pain] : no eye pain [Dysphagia] : no dysphagia [Chest Pain] : no chest pain [Palpitations] : no palpitations [Lower Ext Edema] : no lower extremity edema [Shortness Of Breath] : no shortness of breath [Wheezing] : no wheezing [Cough] : cough [Abdominal Pain] : no abdominal pain [Vomiting] : no vomiting [Constipation] : no constipation [Dysuria] : no dysuria [Joint Pain] : no joint pain [Muscle Pain] : no muscle pain [Skin Rash] : no skin rash

## 2023-07-10 ENCOUNTER — OUTPATIENT (OUTPATIENT)
Dept: OUTPATIENT SERVICES | Facility: HOSPITAL | Age: 78
LOS: 1 days | End: 2023-07-10
Payer: MEDICARE

## 2023-07-10 ENCOUNTER — RESULT REVIEW (OUTPATIENT)
Age: 78
End: 2023-07-10

## 2023-07-10 ENCOUNTER — RESULT CHARGE (OUTPATIENT)
Age: 78
End: 2023-07-10

## 2023-07-10 ENCOUNTER — APPOINTMENT (OUTPATIENT)
Dept: CARDIOLOGY | Facility: CLINIC | Age: 78
End: 2023-07-10
Payer: MEDICARE

## 2023-07-10 VITALS
HEIGHT: 68 IN | HEART RATE: 86 BPM | SYSTOLIC BLOOD PRESSURE: 150 MMHG | DIASTOLIC BLOOD PRESSURE: 86 MMHG | WEIGHT: 190 LBS | BODY MASS INDEX: 28.79 KG/M2

## 2023-07-10 VITALS
SYSTOLIC BLOOD PRESSURE: 133 MMHG | HEART RATE: 77 BPM | WEIGHT: 191.8 LBS | OXYGEN SATURATION: 97 % | HEIGHT: 68 IN | DIASTOLIC BLOOD PRESSURE: 87 MMHG | RESPIRATION RATE: 16 BRPM | TEMPERATURE: 99 F

## 2023-07-10 VITALS — SYSTOLIC BLOOD PRESSURE: 135 MMHG | DIASTOLIC BLOOD PRESSURE: 87 MMHG

## 2023-07-10 DIAGNOSIS — Z01.810 ENCOUNTER FOR PREPROCEDURAL CARDIOVASCULAR EXAMINATION: ICD-10-CM

## 2023-07-10 DIAGNOSIS — Z98.890 OTHER SPECIFIED POSTPROCEDURAL STATES: Chronic | ICD-10-CM

## 2023-07-10 DIAGNOSIS — I72.3 ANEURYSM OF ILIAC ARTERY: ICD-10-CM

## 2023-07-10 DIAGNOSIS — Z01.818 ENCOUNTER FOR OTHER PREPROCEDURAL EXAMINATION: ICD-10-CM

## 2023-07-10 DIAGNOSIS — I71.40 ABDOMINAL AORTIC ANEURYSM, WITHOUT RUPTURE, UNSPECIFIED: ICD-10-CM

## 2023-07-10 LAB
ALBUMIN SERPL ELPH-MCNC: 4.8 G/DL — SIGNIFICANT CHANGE UP (ref 3.5–5.2)
ALP SERPL-CCNC: 72 U/L — SIGNIFICANT CHANGE UP (ref 30–115)
ALT FLD-CCNC: 8 U/L — SIGNIFICANT CHANGE UP (ref 0–41)
ANION GAP SERPL CALC-SCNC: 15 MMOL/L — HIGH (ref 7–14)
APTT BLD: 33.5 SEC — SIGNIFICANT CHANGE UP (ref 27–39.2)
AST SERPL-CCNC: 11 U/L — SIGNIFICANT CHANGE UP (ref 0–41)
BASOPHILS # BLD AUTO: 0.05 K/UL — SIGNIFICANT CHANGE UP (ref 0–0.2)
BASOPHILS NFR BLD AUTO: 0.6 % — SIGNIFICANT CHANGE UP (ref 0–1)
BILIRUB SERPL-MCNC: 0.2 MG/DL — SIGNIFICANT CHANGE UP (ref 0.2–1.2)
BLD GP AB SCN SERPL QL: SIGNIFICANT CHANGE UP
BUN SERPL-MCNC: 22 MG/DL — HIGH (ref 10–20)
CALCIUM SERPL-MCNC: 9.3 MG/DL — SIGNIFICANT CHANGE UP (ref 8.4–10.5)
CHLORIDE SERPL-SCNC: 102 MMOL/L — SIGNIFICANT CHANGE UP (ref 98–110)
CO2 SERPL-SCNC: 21 MMOL/L — SIGNIFICANT CHANGE UP (ref 17–32)
CREAT SERPL-MCNC: 1.2 MG/DL — SIGNIFICANT CHANGE UP (ref 0.7–1.5)
EGFR: 63 ML/MIN/1.73M2 — SIGNIFICANT CHANGE UP
EOSINOPHIL # BLD AUTO: 0.27 K/UL — SIGNIFICANT CHANGE UP (ref 0–0.7)
EOSINOPHIL NFR BLD AUTO: 3.4 % — SIGNIFICANT CHANGE UP (ref 0–8)
GLUCOSE SERPL-MCNC: 93 MG/DL — SIGNIFICANT CHANGE UP (ref 70–99)
HCT VFR BLD CALC: 44.8 % — SIGNIFICANT CHANGE UP (ref 42–52)
HGB BLD-MCNC: 14.6 G/DL — SIGNIFICANT CHANGE UP (ref 14–18)
IMM GRANULOCYTES NFR BLD AUTO: 0.5 % — HIGH (ref 0.1–0.3)
INR BLD: 1.07 RATIO — SIGNIFICANT CHANGE UP (ref 0.65–1.3)
LYMPHOCYTES # BLD AUTO: 2.37 K/UL — SIGNIFICANT CHANGE UP (ref 1.2–3.4)
LYMPHOCYTES # BLD AUTO: 29.7 % — SIGNIFICANT CHANGE UP (ref 20.5–51.1)
MCHC RBC-ENTMCNC: 29.5 PG — SIGNIFICANT CHANGE UP (ref 27–31)
MCHC RBC-ENTMCNC: 32.6 G/DL — SIGNIFICANT CHANGE UP (ref 32–37)
MCV RBC AUTO: 90.5 FL — SIGNIFICANT CHANGE UP (ref 80–94)
MONOCYTES # BLD AUTO: 0.65 K/UL — HIGH (ref 0.1–0.6)
MONOCYTES NFR BLD AUTO: 8.1 % — SIGNIFICANT CHANGE UP (ref 1.7–9.3)
NEUTROPHILS # BLD AUTO: 4.61 K/UL — SIGNIFICANT CHANGE UP (ref 1.4–6.5)
NEUTROPHILS NFR BLD AUTO: 57.7 % — SIGNIFICANT CHANGE UP (ref 42.2–75.2)
NRBC # BLD: 0 /100 WBCS — SIGNIFICANT CHANGE UP (ref 0–0)
PLATELET # BLD AUTO: 220 K/UL — SIGNIFICANT CHANGE UP (ref 130–400)
PMV BLD: 11.6 FL — HIGH (ref 7.4–10.4)
POTASSIUM SERPL-MCNC: 4.8 MMOL/L — SIGNIFICANT CHANGE UP (ref 3.5–5)
POTASSIUM SERPL-SCNC: 4.8 MMOL/L — SIGNIFICANT CHANGE UP (ref 3.5–5)
PROT SERPL-MCNC: 7.8 G/DL — SIGNIFICANT CHANGE UP (ref 6–8)
PROTHROM AB SERPL-ACNC: 12.2 SEC — SIGNIFICANT CHANGE UP (ref 9.95–12.87)
RBC # BLD: 4.95 M/UL — SIGNIFICANT CHANGE UP (ref 4.7–6.1)
RBC # FLD: 14.3 % — SIGNIFICANT CHANGE UP (ref 11.5–14.5)
SODIUM SERPL-SCNC: 138 MMOL/L — SIGNIFICANT CHANGE UP (ref 135–146)
WBC # BLD: 7.99 K/UL — SIGNIFICANT CHANGE UP (ref 4.8–10.8)
WBC # FLD AUTO: 7.99 K/UL — SIGNIFICANT CHANGE UP (ref 4.8–10.8)

## 2023-07-10 PROCEDURE — 85610 PROTHROMBIN TIME: CPT

## 2023-07-10 PROCEDURE — 99214 OFFICE O/P EST MOD 30 MIN: CPT | Mod: 25

## 2023-07-10 PROCEDURE — 86901 BLOOD TYPING SEROLOGIC RH(D): CPT

## 2023-07-10 PROCEDURE — 86850 RBC ANTIBODY SCREEN: CPT

## 2023-07-10 PROCEDURE — 93000 ELECTROCARDIOGRAM COMPLETE: CPT

## 2023-07-10 PROCEDURE — 85025 COMPLETE CBC W/AUTO DIFF WBC: CPT

## 2023-07-10 PROCEDURE — 86900 BLOOD TYPING SEROLOGIC ABO: CPT

## 2023-07-10 PROCEDURE — 93005 ELECTROCARDIOGRAM TRACING: CPT

## 2023-07-10 PROCEDURE — 80053 COMPREHEN METABOLIC PANEL: CPT

## 2023-07-10 PROCEDURE — 36415 COLL VENOUS BLD VENIPUNCTURE: CPT

## 2023-07-10 PROCEDURE — 71046 X-RAY EXAM CHEST 2 VIEWS: CPT | Mod: 26

## 2023-07-10 PROCEDURE — 93010 ELECTROCARDIOGRAM REPORT: CPT

## 2023-07-10 PROCEDURE — 71046 X-RAY EXAM CHEST 2 VIEWS: CPT

## 2023-07-10 PROCEDURE — 85730 THROMBOPLASTIN TIME PARTIAL: CPT

## 2023-07-10 RX ORDER — CIPROFLOXACIN AND DEXAMETHASONE 3; 1 MG/ML; MG/ML
0.3-0.1 SUSPENSION/ DROPS AURICULAR (OTIC)
Qty: 8 | Refills: 0 | Status: DISCONTINUED | COMMUNITY
Start: 2021-11-03 | End: 2023-07-10

## 2023-07-10 RX ORDER — LISINOPRIL 20 MG/1
20 TABLET ORAL TWICE DAILY
Refills: 0 | Status: ACTIVE | COMMUNITY

## 2023-07-10 RX ORDER — SULFAMETHOXAZOLE AND TRIMETHOPRIM 800; 160 MG/1; MG/1
800-160 TABLET ORAL TWICE DAILY
Qty: 6 | Refills: 0 | Status: DISCONTINUED | COMMUNITY
Start: 2022-04-11 | End: 2023-07-10

## 2023-07-10 RX ORDER — SIMVASTATIN 80 MG/1
80 TABLET, FILM COATED ORAL DAILY
Refills: 0 | Status: ACTIVE | COMMUNITY

## 2023-07-10 RX ORDER — GABAPENTIN 300 MG/1
300 CAPSULE ORAL
Qty: 30 | Refills: 0 | Status: DISCONTINUED | COMMUNITY
Start: 2021-06-04 | End: 2023-07-10

## 2023-07-10 RX ORDER — PHENAZOPYRIDINE 100 MG/1
100 TABLET, FILM COATED ORAL 3 TIMES DAILY
Qty: 15 | Refills: 0 | Status: DISCONTINUED | COMMUNITY
Start: 2022-04-11 | End: 2023-07-10

## 2023-07-10 NOTE — PHYSICAL EXAM
[Well Developed] : well developed [Well Nourished] : well nourished [No Acute Distress] : no acute distress [Normal Conjunctiva] : normal conjunctiva [Normal Venous Pressure] : normal venous pressure [No Carotid Bruit] : no carotid bruit [Normal S1, S2] : normal S1, S2 [No Murmur] : no murmur [No Rub] : no rub [No Gallop] : no gallop [Clear Lung Fields] : clear lung fields [Good Air Entry] : good air entry [Soft] : abdomen soft [No Respiratory Distress] : no respiratory distress  [Non Tender] : non-tender [Normal Bowel Sounds] : normal bowel sounds [Normal Gait] : normal gait [No Edema] : no edema [No Cyanosis] : no cyanosis [No Clubbing] : no clubbing [No Varicosities] : no varicosities [Diminished Pedal Pulses ___] : diminished pedal pulses [unfilled] [No Rash] : no rash [No Skin Lesions] : no skin lesions [Moves all extremities] : moves all extremities [No Focal Deficits] : no focal deficits [Normal Speech] : normal speech [Alert and Oriented] : alert and oriented [Normal memory] : normal memory

## 2023-07-10 NOTE — H&P PST ADULT - REASON FOR ADMISSION
Case Type: OP   Suite: [facility]  Proceduralist: Myke Paniagua  Confirmed Surgery Date Time: 07-   PAST Date Time: 07- - 13:30  Procedure: ENDOVASCULAR ABDOMINAL AORTO ANEURYSM REPAIR  Laterality: N/A  Length of Procedure: 120 Minutes  Anesthesia Type: General

## 2023-07-10 NOTE — H&P PST ADULT - MUSCULOSKELETAL
Detail Level: Simple
Render Risk Assessment In Note?: yes
Additional Notes: Patient consent was obtained to proceed with the visit and recommended plan of care after discussion of all risks and benefits, including the risks of COVID-19 exposure.
normal/ROM intact/normal gait/strength 5/5 bilateral upper extremities/strength 5/5 bilateral lower extremities

## 2023-07-10 NOTE — H&P PST ADULT - HISTORY OF PRESENT ILLNESS
Patient denies any cp, sob, palpitations, fever, cough, URI, abdominal pains, N/V, UTI, Rashes or open wounds.  As per patient exercise tolerance of 1 fos walks with out sob  Patient had COVID on 2021  Patient denies any s/s covid 19 and reports no contact with known positive people. Patient instructed to continue to self monitor and report any concerns to MD. Pt will continue to practice self isolation and  exposure control measures pre op.  Anesthesia Alert  NO--Difficult Airway  NO--History of neck surgery or radiation  NO--Limited ROM of neck  NO--History of Malignant hyperthermia  NO--Personal or family history of Pseudocholinesterase deficiency  NO--Prior Anesthesia Complication  NO--Latex Allergy  NO--Loose teeth. Top and Bottom Dentures   Pt instructed to stop vitamins/supplements/herbal medications for one week prior to surgery  As per patient this is the complete medical, surgical history and medications.    NO--History of Rheumatoid Arthritis  NO--PREET  NO--Risk of bleeding      John Spence is a 76 yo male with PMH of HTN, HLD, AAA ( 4.2 Cm since 03/21/2021) who presents to pretesting for the preparations of the above procedure due to CT angio abdomen and pelvis done on 05/31/2023 and it showed Increased size 5.1cm infrarenal abdominal aortic aneurysm with extensive mural thrombus.  Patient denies any cp, sob, palpitations, fever, cough, URI, abdominal pains, N/V, UTI, Rashes or open wounds.  As per patient exercise tolerance of 1 fos walks with out sob  Patient had COVID on 2021  Patient denies any s/s covid 19 and reports no contact with known positive people. Patient instructed to continue to self monitor and report any concerns to MD. Pt will continue to practice self isolation and  exposure control measures pre op.  Anesthesia Alert  NO--Difficult Airway  NO--History of neck surgery or radiation  NO--Limited ROM of neck  NO--History of Malignant hyperthermia  NO--Personal or family history of Pseudocholinesterase deficiency  NO--Prior Anesthesia Complication  NO--Latex Allergy  NO--Loose teeth. Top and Bottom Dentures  NO--History of Rheumatoid Arthritis  NO--PREET  NO--Risk of bleeding   Pt instructed to stop vitamins/supplements/herbal medications for one week prior to surgery  As per patient this is the complete medical, surgical history and medications.

## 2023-07-11 DIAGNOSIS — Z01.818 ENCOUNTER FOR OTHER PREPROCEDURAL EXAMINATION: ICD-10-CM

## 2023-07-11 DIAGNOSIS — I71.40 ABDOMINAL AORTIC ANEURYSM, WITHOUT RUPTURE, UNSPECIFIED: ICD-10-CM

## 2023-07-12 ENCOUNTER — OUTPATIENT (OUTPATIENT)
Dept: OUTPATIENT SERVICES | Facility: HOSPITAL | Age: 78
LOS: 1 days | End: 2023-07-12
Payer: MEDICARE

## 2023-07-12 DIAGNOSIS — Z98.890 OTHER SPECIFIED POSTPROCEDURAL STATES: Chronic | ICD-10-CM

## 2023-07-12 DIAGNOSIS — I71.40 ABDOMINAL AORTIC ANEURYSM, WITHOUT RUPTURE, UNSPECIFIED: ICD-10-CM

## 2023-07-12 PROCEDURE — A9500: CPT

## 2023-07-12 PROCEDURE — 78452 HT MUSCLE IMAGE SPECT MULT: CPT

## 2023-07-12 PROCEDURE — 78452 HT MUSCLE IMAGE SPECT MULT: CPT | Mod: 26

## 2023-07-12 PROCEDURE — 93018 CV STRESS TEST I&R ONLY: CPT

## 2023-07-12 NOTE — ADDENDUM
[FreeTextEntry1] : Lexiscan MPI:\par No ischemia, normal LVEF.\par \par Impression:\par Low risk for cardiovascular complications of EVAR.\par \par Recommend:\par Proceed with surgery. \par Continue ASA, Metoprolol.

## 2023-07-12 NOTE — HISTORY OF PRESENT ILLNESS
[FreeTextEntry1] : 76-yo male with h/o PAD, AAA, anemia, occasional SVT, mild ascending aorta aneurysm, chronic back pain presents for cardiology visit. Patient is planning an endovascular repair of abdominal aneurysm with  and needs a pre op cardiac clearance. \par \par Patient denies palpitations CP but reports CRAIG..\par \par Still smokes 6 cig a day.

## 2023-07-12 NOTE — CARDIOLOGY SUMMARY
[de-identified] : 07/10/2023: SR, normal ECG\par 12/17/21:\par Normal. [de-identified] : TTE\par 02/03/2022- EF 61%, grade I DD, AA 4.2 cm, mild aortic valve leaflet thickening [de-identified] : CT angio abdomen/pelvis:\par 05/31/2023- Infrarenal abdominal aneurysm 5.1 cm with extensive mural thrombus [de-identified] : Holter monitor:\par 12/17/2021-12/23/2021: 3 events of SVT, longest 19 beats.

## 2023-07-12 NOTE — ASSESSMENT
[FreeTextEntry1] : 76-yo male with h/o AAA and LE PAD.\par HTN uncontrolled.\par Episodes of AT.\par Preoperative evaluation for EVAR.\par \par Plan:\par Smoking cessation discussed with patient.\par Continue Lisinopril, start Metoprolol 50 mg daily.\par Lexiscan MPI prior to aortic surgery.\par Will need fasting blood work.\par F/u 6 weeks.\par \par Landry Thakkar MD\par

## 2023-07-12 NOTE — REVIEW OF SYSTEMS
[Leg Claudication] : intermittent leg claudication [Joint Pain] : joint pain [Joint Stiffness] : joint stiffness [Negative] : Neurological [Dyspnea on exertion] : not dyspnea during exertion [Chest Discomfort] : no chest discomfort [Lower Ext Edema] : no extremity edema [Palpitations] : no palpitations [Orthopnea] : no orthopnea [Syncope] : no syncope [Cough] : no cough [Wheezing] : no wheezing [Rash] : no rash [Anxiety] : no anxiety

## 2023-07-13 DIAGNOSIS — I71.40 ABDOMINAL AORTIC ANEURYSM, WITHOUT RUPTURE, UNSPECIFIED: ICD-10-CM

## 2023-07-21 NOTE — ASU PATIENT PROFILE, ADULT - FALL HARM RISK - UNIVERSAL INTERVENTIONS
Bed in lowest position, wheels locked, appropriate side rails in place/Call bell, personal items and telephone in reach/Instruct patient to call for assistance before getting out of bed or chair/Non-slip footwear when patient is out of bed/[place] to call system/Physically safe environment - no spills, clutter or unnecessary equipment/Purposeful Proactive Rounding/Room/bathroom lighting operational, light cord in reach

## 2023-07-24 ENCOUNTER — TRANSCRIPTION ENCOUNTER (OUTPATIENT)
Age: 78
End: 2023-07-24

## 2023-07-24 ENCOUNTER — INPATIENT (INPATIENT)
Facility: HOSPITAL | Age: 78
LOS: 0 days | Discharge: HOME | DRG: 269 | End: 2023-07-25
Attending: SURGERY | Admitting: SURGERY
Payer: MEDICARE

## 2023-07-24 ENCOUNTER — APPOINTMENT (OUTPATIENT)
Dept: VASCULAR SURGERY | Facility: HOSPITAL | Age: 78
End: 2023-07-24

## 2023-07-24 VITALS
DIASTOLIC BLOOD PRESSURE: 73 MMHG | TEMPERATURE: 98 F | OXYGEN SATURATION: 96 % | SYSTOLIC BLOOD PRESSURE: 118 MMHG | HEIGHT: 67 IN | RESPIRATION RATE: 17 BRPM | WEIGHT: 190.04 LBS | HEART RATE: 60 BPM

## 2023-07-24 DIAGNOSIS — Z98.890 OTHER SPECIFIED POSTPROCEDURAL STATES: Chronic | ICD-10-CM

## 2023-07-24 DIAGNOSIS — I71.40 ABDOMINAL AORTIC ANEURYSM, WITHOUT RUPTURE, UNSPECIFIED: ICD-10-CM

## 2023-07-24 LAB
ABO RH CONFIRMATION: SIGNIFICANT CHANGE UP
ANION GAP SERPL CALC-SCNC: 6 MMOL/L — LOW (ref 7–14)
BASOPHILS # BLD AUTO: 0.03 K/UL — SIGNIFICANT CHANGE UP (ref 0–0.2)
BASOPHILS NFR BLD AUTO: 0.4 % — SIGNIFICANT CHANGE UP (ref 0–1)
BUN SERPL-MCNC: 16 MG/DL — SIGNIFICANT CHANGE UP (ref 10–20)
CALCIUM SERPL-MCNC: 8.3 MG/DL — LOW (ref 8.4–10.5)
CHLORIDE SERPL-SCNC: 109 MMOL/L — SIGNIFICANT CHANGE UP (ref 98–110)
CK MB CFR SERPL CALC: 1.6 NG/ML — SIGNIFICANT CHANGE UP (ref 0.6–6.3)
CK SERPL-CCNC: 54 U/L — SIGNIFICANT CHANGE UP (ref 0–225)
CO2 SERPL-SCNC: 24 MMOL/L — SIGNIFICANT CHANGE UP (ref 17–32)
CREAT SERPL-MCNC: 1 MG/DL — SIGNIFICANT CHANGE UP (ref 0.7–1.5)
EGFR: 78 ML/MIN/1.73M2 — SIGNIFICANT CHANGE UP
EOSINOPHIL # BLD AUTO: 0.28 K/UL — SIGNIFICANT CHANGE UP (ref 0–0.7)
EOSINOPHIL NFR BLD AUTO: 4.1 % — SIGNIFICANT CHANGE UP (ref 0–8)
GLUCOSE SERPL-MCNC: 91 MG/DL — SIGNIFICANT CHANGE UP (ref 70–99)
HCT VFR BLD CALC: 37.3 % — LOW (ref 42–52)
HGB BLD-MCNC: 12.2 G/DL — LOW (ref 14–18)
IMM GRANULOCYTES NFR BLD AUTO: 0.6 % — HIGH (ref 0.1–0.3)
LACTATE SERPL-SCNC: 0.6 MMOL/L — LOW (ref 0.7–2)
LYMPHOCYTES # BLD AUTO: 2.2 K/UL — SIGNIFICANT CHANGE UP (ref 1.2–3.4)
LYMPHOCYTES # BLD AUTO: 32.4 % — SIGNIFICANT CHANGE UP (ref 20.5–51.1)
MAGNESIUM SERPL-MCNC: 1.7 MG/DL — LOW (ref 1.8–2.4)
MCHC RBC-ENTMCNC: 28.9 PG — SIGNIFICANT CHANGE UP (ref 27–31)
MCHC RBC-ENTMCNC: 32.7 G/DL — SIGNIFICANT CHANGE UP (ref 32–37)
MCV RBC AUTO: 88.4 FL — SIGNIFICANT CHANGE UP (ref 80–94)
MONOCYTES # BLD AUTO: 0.43 K/UL — SIGNIFICANT CHANGE UP (ref 0.1–0.6)
MONOCYTES NFR BLD AUTO: 6.3 % — SIGNIFICANT CHANGE UP (ref 1.7–9.3)
NEUTROPHILS # BLD AUTO: 3.81 K/UL — SIGNIFICANT CHANGE UP (ref 1.4–6.5)
NEUTROPHILS NFR BLD AUTO: 56.2 % — SIGNIFICANT CHANGE UP (ref 42.2–75.2)
NRBC # BLD: 0 /100 WBCS — SIGNIFICANT CHANGE UP (ref 0–0)
PHOSPHATE SERPL-MCNC: 3.1 MG/DL — SIGNIFICANT CHANGE UP (ref 2.1–4.9)
PLATELET # BLD AUTO: 166 K/UL — SIGNIFICANT CHANGE UP (ref 130–400)
PMV BLD: 10.6 FL — HIGH (ref 7.4–10.4)
POTASSIUM SERPL-MCNC: 4.2 MMOL/L — SIGNIFICANT CHANGE UP (ref 3.5–5)
POTASSIUM SERPL-SCNC: 4.2 MMOL/L — SIGNIFICANT CHANGE UP (ref 3.5–5)
RBC # BLD: 4.22 M/UL — LOW (ref 4.7–6.1)
RBC # FLD: 14.5 % — SIGNIFICANT CHANGE UP (ref 11.5–14.5)
SODIUM SERPL-SCNC: 139 MMOL/L — SIGNIFICANT CHANGE UP (ref 135–146)
TROPONIN T SERPL-MCNC: <0.01 NG/ML — SIGNIFICANT CHANGE UP
WBC # BLD: 6.79 K/UL — SIGNIFICANT CHANGE UP (ref 4.8–10.8)
WBC # FLD AUTO: 6.79 K/UL — SIGNIFICANT CHANGE UP (ref 4.8–10.8)

## 2023-07-24 PROCEDURE — 80048 BASIC METABOLIC PNL TOTAL CA: CPT

## 2023-07-24 PROCEDURE — 82550 ASSAY OF CK (CPK): CPT

## 2023-07-24 PROCEDURE — 82553 CREATINE MB FRACTION: CPT

## 2023-07-24 PROCEDURE — 83605 ASSAY OF LACTIC ACID: CPT

## 2023-07-24 PROCEDURE — C1894: CPT

## 2023-07-24 PROCEDURE — 85025 COMPLETE CBC W/AUTO DIFF WBC: CPT

## 2023-07-24 PROCEDURE — 36415 COLL VENOUS BLD VENIPUNCTURE: CPT

## 2023-07-24 PROCEDURE — 93005 ELECTROCARDIOGRAM TRACING: CPT

## 2023-07-24 PROCEDURE — 84100 ASSAY OF PHOSPHORUS: CPT

## 2023-07-24 PROCEDURE — C9399: CPT

## 2023-07-24 PROCEDURE — C1773: CPT

## 2023-07-24 PROCEDURE — 71045 X-RAY EXAM CHEST 1 VIEW: CPT | Mod: 26

## 2023-07-24 PROCEDURE — C1889: CPT

## 2023-07-24 PROCEDURE — 71045 X-RAY EXAM CHEST 1 VIEW: CPT

## 2023-07-24 PROCEDURE — C1725: CPT

## 2023-07-24 PROCEDURE — 83735 ASSAY OF MAGNESIUM: CPT

## 2023-07-24 PROCEDURE — 84484 ASSAY OF TROPONIN QUANT: CPT

## 2023-07-24 PROCEDURE — 93010 ELECTROCARDIOGRAM REPORT: CPT

## 2023-07-24 PROCEDURE — C1751: CPT

## 2023-07-24 PROCEDURE — 34705 EVAC RPR A-BIILIAC NDGFT: CPT

## 2023-07-24 PROCEDURE — C1760: CPT

## 2023-07-24 PROCEDURE — 34713 PERQ ACCESS & CLSR FEM ART: CPT | Mod: 50

## 2023-07-24 PROCEDURE — C1769: CPT

## 2023-07-24 PROCEDURE — C1874: CPT

## 2023-07-24 PROCEDURE — C1887: CPT

## 2023-07-24 PROCEDURE — 74019 RADEX ABDOMEN 2 VIEWS: CPT

## 2023-07-24 PROCEDURE — 99221 1ST HOSP IP/OBS SF/LOW 40: CPT

## 2023-07-24 RX ORDER — PANTOPRAZOLE SODIUM 20 MG/1
40 TABLET, DELAYED RELEASE ORAL
Refills: 0 | Status: DISCONTINUED | OUTPATIENT
Start: 2023-07-24 | End: 2023-07-25

## 2023-07-24 RX ORDER — LISINOPRIL 2.5 MG/1
1 TABLET ORAL
Refills: 0 | DISCHARGE

## 2023-07-24 RX ORDER — LISINOPRIL 2.5 MG/1
20 TABLET ORAL DAILY
Refills: 0 | Status: DISCONTINUED | OUTPATIENT
Start: 2023-07-24 | End: 2023-07-24

## 2023-07-24 RX ORDER — HYDROMORPHONE HYDROCHLORIDE 2 MG/ML
1 INJECTION INTRAMUSCULAR; INTRAVENOUS; SUBCUTANEOUS
Refills: 0 | Status: DISCONTINUED | OUTPATIENT
Start: 2023-07-24 | End: 2023-07-24

## 2023-07-24 RX ORDER — HYDROMORPHONE HYDROCHLORIDE 2 MG/ML
0.5 INJECTION INTRAMUSCULAR; INTRAVENOUS; SUBCUTANEOUS
Refills: 0 | Status: DISCONTINUED | OUTPATIENT
Start: 2023-07-24 | End: 2023-07-24

## 2023-07-24 RX ORDER — MEPERIDINE HYDROCHLORIDE 50 MG/ML
12.5 INJECTION INTRAMUSCULAR; INTRAVENOUS; SUBCUTANEOUS
Refills: 0 | Status: DISCONTINUED | OUTPATIENT
Start: 2023-07-24 | End: 2023-07-24

## 2023-07-24 RX ORDER — CHLORHEXIDINE GLUCONATE 213 G/1000ML
1 SOLUTION TOPICAL
Refills: 0 | Status: DISCONTINUED | OUTPATIENT
Start: 2023-07-24 | End: 2023-07-25

## 2023-07-24 RX ORDER — TRAZODONE HCL 50 MG
50 TABLET ORAL AT BEDTIME
Refills: 0 | Status: DISCONTINUED | OUTPATIENT
Start: 2023-07-24 | End: 2023-07-25

## 2023-07-24 RX ORDER — MAGNESIUM SULFATE 500 MG/ML
2 VIAL (ML) INJECTION ONCE
Refills: 0 | Status: COMPLETED | OUTPATIENT
Start: 2023-07-24 | End: 2023-07-24

## 2023-07-24 RX ORDER — ACETAMINOPHEN 500 MG
650 TABLET ORAL EVERY 6 HOURS
Refills: 0 | Status: DISCONTINUED | OUTPATIENT
Start: 2023-07-24 | End: 2023-07-25

## 2023-07-24 RX ORDER — TRAZODONE HCL 50 MG
0 TABLET ORAL
Refills: 0 | DISCHARGE

## 2023-07-24 RX ORDER — CEFAZOLIN SODIUM 1 G
2000 VIAL (EA) INJECTION EVERY 8 HOURS
Refills: 0 | Status: COMPLETED | OUTPATIENT
Start: 2023-07-24 | End: 2023-07-25

## 2023-07-24 RX ORDER — LISINOPRIL 2.5 MG/1
20 TABLET ORAL DAILY
Refills: 0 | Status: DISCONTINUED | OUTPATIENT
Start: 2023-07-25 | End: 2023-07-25

## 2023-07-24 RX ORDER — SIMVASTATIN 20 MG/1
1 TABLET, FILM COATED ORAL
Refills: 0 | DISCHARGE

## 2023-07-24 RX ORDER — ESOMEPRAZOLE MAGNESIUM 40 MG/1
1 CAPSULE, DELAYED RELEASE ORAL
Refills: 0 | DISCHARGE

## 2023-07-24 RX ORDER — ATORVASTATIN CALCIUM 80 MG/1
40 TABLET, FILM COATED ORAL AT BEDTIME
Refills: 0 | Status: DISCONTINUED | OUTPATIENT
Start: 2023-07-24 | End: 2023-07-25

## 2023-07-24 RX ORDER — ONDANSETRON 8 MG/1
4 TABLET, FILM COATED ORAL ONCE
Refills: 0 | Status: DISCONTINUED | OUTPATIENT
Start: 2023-07-24 | End: 2023-07-24

## 2023-07-24 RX ADMIN — ATORVASTATIN CALCIUM 40 MILLIGRAM(S): 80 TABLET, FILM COATED ORAL at 22:39

## 2023-07-24 RX ADMIN — Medication 100 MILLIGRAM(S): at 22:39

## 2023-07-24 RX ADMIN — Medication 650 MILLIGRAM(S): at 18:26

## 2023-07-24 RX ADMIN — Medication 25 GRAM(S): at 17:03

## 2023-07-24 RX ADMIN — Medication 50 MILLIGRAM(S): at 22:39

## 2023-07-24 RX ADMIN — Medication 650 MILLIGRAM(S): at 18:50

## 2023-07-24 NOTE — CONSULT NOTE ADULT - ASSESSMENT
Assessment & Plan    77y Male  POD# s/p EVAR     NEURO:  #Acute pain    -APAP prn  #Insomnia   -Trazodone 50 mg bedtime      RESP:   #Oxygenation    -wean off NC to RA as tolerated    -CXR     -IS   #Activity    -increase as tolerated    CARDS:   #AAA    -CTAP significant for increased size 5.1 cm infrarenal abdominal aortic aneurysm with extensive mural thrombus (previously 4.2 cm.)     -s/p EVAR    -BP goal: 110-160    -If BP >160, enalaprilat 1.25q6 PRN     -Q1 vascular checks     -Pulse exam: DP and PT signals bilaterally   #Hx of HTN, HLD    -home Lisinopril 20 mg     -home Simvastatin 80 mg (Atorvastatin 40 mg inpatient)  Labs:   trop neg x1   EKG post op: NSR        GI/NUTR:   #Diet, Clear Liquid, advance diet as tolerated per vascular     -aspiration precautions  #GI Prophylaxis    -Protonix (home Esomeprazole 40 mg)  #Bowel regimen    -senna/miralax    -last bowel movement      /RENAL:   #urine output in critically ill    -indwelling catheter    -D/c tsai at midnight per vascular     Labs:          BUN/Cr- 16/1.0  -->          Electrolytes-Na 139 // K 4.2 // Mg 1.7 //  Phos 3.1 (07-24 @ 16:20)    HEME/ONC:   -Holding home ASA and holding DVT ppx. F/u restarting tmw with vascular     Labs: Hb/Hct:  12.2/37.3  (07-24 @ 16:20)  -->                      Plts:  166  -->                 PTT/INR:        ID:  WBC- 6.79  --->>  Temp trend- 24hrs T(F): 99.1 (07-24 @ 16:50), Max: 99.1 (07-24 @ 16:50)  Current antibiotics-ceFAZolin   IVPB 2000 every 8 hours    ENDO:    -Glucose goal 140-180    -if above 180 start ISS     HA1C     MSK:     -Activity - Lay flat x4 hours, bedrest tonight   #Degenerative Disc Disease     -Back Surgery     LINES/DRAINS:  PIV, Tsai, left radial a line     ADVANCED DIRECTIVES:  Full Code    HCP/Emergency Contact-    INDICATION FOR SICU/SDU: Abdominal aortic aneurysm (AAA) without rupture           DISPO:   SICU. Case discussed with attending Dr. Mccoy Assessment & Plan    77y Male  POD#0 s/p EVAR     NEURO:  #Acute pain    -APAP prn  #Insomnia   -Trazodone 50 mg bedtime      RESP:   #Smoker 0.25 PPD x50 years   #Oxygenation    -wean off NC to RA as tolerated    -CXR     -IS   #Activity    -increase as tolerated    CARDS:   #AAA    -CTAP significant for increased size 5.1 cm infrarenal abdominal aortic aneurysm with extensive mural thrombus (previously 4.2 cm.)     -s/p EVAR    -BP goal: 110-160    -If BP >160, enalaprilat 1.25q6 PRN     -Q1 vascular checks     -Pulse exam: DP and PT signals bilaterally   #Hx of HTN, HLD    -home Lisinopril 20 mg     -home Simvastatin 80 mg (Atorvastatin 40 mg inpatient)  Labs:   trop neg x1   EKG post op: NSR        GI/NUTR:   #Diet, Clear Liquid, advance diet as tolerated per vascular     -aspiration precautions  #GI Prophylaxis    -Protonix (home Esomeprazole 40 mg)  #Bowel regimen    -senna/miralax    -last bowel movement      /RENAL:   #urine output in critically ill    -indwelling catheter    -D/c tsai at midnight per vascular     Labs:          BUN/Cr- 16/1.0  -->          Electrolytes-Na 139 // K 4.2 // Mg 1.7 //  Phos 3.1 (07-24 @ 16:20)    HEME/ONC:   -Holding home ASA and holding DVT ppx. F/u restarting tmw with vascular     Labs: Hb/Hct:  12.2/37.3  (07-24 @ 16:20)  -->                      Plts:  166  -->                 PTT/INR:        ID:  WBC- 6.79  --->>  Temp trend- 24hrs T(F): 99.1 (07-24 @ 16:50), Max: 99.1 (07-24 @ 16:50)  Current antibiotics-ceFAZolin   IVPB 2000 every 8 hours    ENDO:    -Glucose goal 140-180    -if above 180 start ISS     HA1C     MSK:     -Activity - Lay flat x4 hours, bedrest tonight   #Degenerative Disc Disease     -Back Surgery     LINES/DRAINS:  PIV, Tsai, left radial a line     ADVANCED DIRECTIVES:  Full Code    HCP/Emergency Contact-    INDICATION FOR SICU/SDU: Abdominal aortic aneurysm (AAA) without rupture           DISPO:   SICU. Case discussed with attending Dr. Mccoy Assessment & Plan    77y Male  POD#0 s/p EVAR     NEURO:  #Acute pain    -APAP prn  #Insomnia   -Trazodone 50 mg bedtime      RESP:   #Smoker 0.25 PPD x50 years   #Oxygenation    -wean off NC to RA as tolerated    -CXR     -IS   #Activity    -increase as tolerated    CARDS:   #AAA    -CTAP significant for increased size 5.1 cm infrarenal abdominal aortic aneurysm with extensive mural thrombus (previously 4.2 cm.)     -s/p EVAR    -BP goal: 110-160    -If BP >160, enalaprilat 1.25q6 PRN     -Q1 vascular checks     -Pulse exam: DP and PT signals bilaterally   #Hx of HTN, HLD    -home Lisinopril 20 mg     -home Simvastatin 80 mg (Atorvastatin 40 mg inpatient)  Labs:   trop neg x1   EKG post op: NSR        GI/NUTR:   #Diet, Clear Liquid, advance diet as tolerated per vascular     -aspiration precautions  #GI Prophylaxis    -Protonix (home Esomeprazole 40 mg)  #Bowel regimen    -not indicated      /RENAL:   #urine output in critically ill    -indwelling catheter    -D/c tsai at midnight per vascular     Labs:          BUN/Cr- 16/1.0  -->          Electrolytes-Na 139 // K 4.2 // Mg 1.7 //  Phos 3.1 (07-24 @ 16:20)    HEME/ONC:   -Holding home ASA and holding DVT ppx. F/u restarting tmw with vascular     Labs: Hb/Hct:  12.2/37.3  (07-24 @ 16:20)  -->                      Plts:  166  -->                 PTT/INR:        ID:  WBC- 6.79  --->>  Temp trend- 24hrs T(F): 99.1 (07-24 @ 16:50), Max: 99.1 (07-24 @ 16:50)  Current antibiotics-ceFAZolin   IVPB 2000 every 8 hours    ENDO:    -Glucose goal 140-180    -if above 180 start ISS     HA1C     MSK:     -Activity - Lay flat x4 hours, bedrest tonight   #Degenerative Disc Disease     -Back Surgery     LINES/DRAINS:  PIV, Tsai, left radial a line     ADVANCED DIRECTIVES:  Full Code    HCP/Emergency Contact-    INDICATION FOR SICU/SDU: Abdominal aortic aneurysm (AAA) without rupture           DISPO:   SICU. Case discussed with attending Dr. Mccoy

## 2023-07-24 NOTE — CONSULT NOTE ADULT - SUBJECTIVE AND OBJECTIVE BOX
MEDARDO GUTIERREZ     77y Male    MRN-[id number]    Admit Date: 07-24-23  Hospital LOS:   ICU Admission Date: 7/24/2023  OR #1-        ============================  HPI: This is a 77 y male with PMHx of HTN, HLD, degenerative disc disease, AAA, smoker presenting today s/p EVAR. CTAP significant for increased size 5.1 cm infrarenal abdominal aortic aneurysm with extensive mural thrombus (previously 4.2 cm.) Patient denies any chest pain, shortness of breath, nausea, tingling, numbness, or pain at this time. Seen and examined in PACU, /70s, PT and DP signals bilaterally.       Procedure:  OR Stats  OR Time: 1 hr, 25 min              EBL:  50        IV Fluids: 2.2 L      Blood Products: None               UOP: 250      Findings- operation; evar AFX2         24 Hour Events  -Admission under SICU service      [X] A ten-point review of systems was otherwise negative except as noted above.  [  ] Due to altered mental status/intubation, subjective information was not attained from the patient. History was obtained, to the extent possible, from review of the chart and collateral sources of information.    =========================================================================================================================================    PMH  PAST MEDICAL & SURGICAL HISTORY:  HTN (hypertension)    Abdominal aortic aneurysm    Elevated lipids    History of degenerative disc disease    History of back surgery        Home Meds:  Home Medications:  esomeprazole 40 mg oral delayed release capsule: 1 orally once a day (at bedtime) (24 Jul 2023 08:18)  lisinopril 20 mg oral tablet: 1 orally once a day (24 Jul 2023 08:18)  simvastatin 80 mg oral tablet: 1 orally once a day (at bedtime) (24 Jul 2023 08:18)  traZODone 50 mg oral tablet: orally once a day (at bedtime) (24 Jul 2023 08:18)     Allergies  Allergies    No Known Allergies    Intolerances       Current Medications:  acetaminophen     Tablet .. 650 milliGRAM(s) Oral every 6 hours PRN Mild Pain (1 - 3)  atorvastatin 40 milliGRAM(s) Oral at bedtime  ceFAZolin   IVPB 2000 milliGRAM(s) IV Intermittent every 8 hours  chlorhexidine 4% Liquid 1 Application(s) Topical <User Schedule>  enalaprilat Injectable 1.25 milliGRAM(s) IV Push every 6 hours PRN BP >160  traZODone 50 milliGRAM(s) Oral at bedtime      VITAL SIGNS, INS/OUTS (Last 24Hours)  ICU Vital Signs Last 24 Hrs  T(C): 37.3 (24 Jul 2023 16:50), Max: 37.3 (24 Jul 2023 16:50)  T(F): 99.1 (24 Jul 2023 16:50), Max: 99.1 (24 Jul 2023 16:50)  HR: 74 (24 Jul 2023 17:00) (60 - 84)  BP: 134/78 (24 Jul 2023 16:20) (118/73 - 164/78)  BP(mean): --  ABP: 150/73 (24 Jul 2023 17:20) (146/74 - 159/77)  ABP(mean): 105 (24 Jul 2023 17:20) (104 - 111)  RR: 12 (24 Jul 2023 17:00) (12 - 20)  SpO2: 95% (24 Jul 2023 17:00) (95% - 100%)    O2 Parameters below as of 24 Jul 2023 17:00  Patient On (Oxygen Delivery Method): room air          I&O's Summary    24 Jul 2023 07:01  -  24 Jul 2023 17:25  --------------------------------------------------------  IN: 0 mL / OUT: 150 mL / NET: -150 mL            Physical Exam:   ----------------------------------------------------------------------------------------------------------  GCS: 15     Exam: A&Ox3, no focal deficits, follows commands, no facial asymmetry, sensation intact     RESPIRATORY:  Normal expansion/effort.     CARDIOVASCULAR:  S1/S2.  RRR  DP and PT signals b/l   B/l groin dressings c/d/i, no hematoma/ swelling     GASTROINTESTINAL:  Abdomen soft, non-tender, non-distended.     MUSCULOSKELETAL:  Extremities warm, pink, well-perfused.     DERM:  No skin breakdown     :   Exam: Avalos catheter in place.     Height (cm): 170.2 (07-24-23)  Weight (kg): 86.2 (07-24-23)  BMI (kg/m2): 29.8 (07-24-23)  BSA (m2): 1.98 (07-24-23)    Tubes/Lines/Drains   ----------------------------------------------------------------------------------------------------------  [x] Peripheral IV  [X] Arterial Line		      Left    Date Placed: 7/24/2023  [X] Urinary Catheter Avalos                                             Date Placed: 7/24/2023

## 2023-07-24 NOTE — ASU PREOP CHECKLIST - SPO2 (%)
normal/ROM intact/normal gait/strength 5/5 bilateral upper extremities/strength 5/5 bilateral lower extremities/extremities exam
96

## 2023-07-25 ENCOUNTER — TRANSCRIPTION ENCOUNTER (OUTPATIENT)
Age: 78
End: 2023-07-25

## 2023-07-25 VITALS — OXYGEN SATURATION: 96 % | HEART RATE: 73 BPM | SYSTOLIC BLOOD PRESSURE: 121 MMHG | DIASTOLIC BLOOD PRESSURE: 63 MMHG

## 2023-07-25 LAB
ANION GAP SERPL CALC-SCNC: 9 MMOL/L — SIGNIFICANT CHANGE UP (ref 7–14)
BASOPHILS # BLD AUTO: 0.04 K/UL — SIGNIFICANT CHANGE UP (ref 0–0.2)
BASOPHILS NFR BLD AUTO: 0.5 % — SIGNIFICANT CHANGE UP (ref 0–1)
BUN SERPL-MCNC: 15 MG/DL — SIGNIFICANT CHANGE UP (ref 10–20)
CALCIUM SERPL-MCNC: 8.3 MG/DL — LOW (ref 8.4–10.5)
CHLORIDE SERPL-SCNC: 107 MMOL/L — SIGNIFICANT CHANGE UP (ref 98–110)
CO2 SERPL-SCNC: 23 MMOL/L — SIGNIFICANT CHANGE UP (ref 17–32)
CREAT SERPL-MCNC: 1 MG/DL — SIGNIFICANT CHANGE UP (ref 0.7–1.5)
EGFR: 78 ML/MIN/1.73M2 — SIGNIFICANT CHANGE UP
EOSINOPHIL # BLD AUTO: 0.21 K/UL — SIGNIFICANT CHANGE UP (ref 0–0.7)
EOSINOPHIL NFR BLD AUTO: 2.5 % — SIGNIFICANT CHANGE UP (ref 0–8)
GLUCOSE SERPL-MCNC: 99 MG/DL — SIGNIFICANT CHANGE UP (ref 70–99)
HCT VFR BLD CALC: 38 % — LOW (ref 42–52)
HGB BLD-MCNC: 12.8 G/DL — LOW (ref 14–18)
IMM GRANULOCYTES NFR BLD AUTO: 0.4 % — HIGH (ref 0.1–0.3)
LYMPHOCYTES # BLD AUTO: 1.84 K/UL — SIGNIFICANT CHANGE UP (ref 1.2–3.4)
LYMPHOCYTES # BLD AUTO: 21.7 % — SIGNIFICANT CHANGE UP (ref 20.5–51.1)
MAGNESIUM SERPL-MCNC: 2 MG/DL — SIGNIFICANT CHANGE UP (ref 1.8–2.4)
MCHC RBC-ENTMCNC: 30 PG — SIGNIFICANT CHANGE UP (ref 27–31)
MCHC RBC-ENTMCNC: 33.7 G/DL — SIGNIFICANT CHANGE UP (ref 32–37)
MCV RBC AUTO: 89.2 FL — SIGNIFICANT CHANGE UP (ref 80–94)
MONOCYTES # BLD AUTO: 0.62 K/UL — HIGH (ref 0.1–0.6)
MONOCYTES NFR BLD AUTO: 7.3 % — SIGNIFICANT CHANGE UP (ref 1.7–9.3)
NEUTROPHILS # BLD AUTO: 5.74 K/UL — SIGNIFICANT CHANGE UP (ref 1.4–6.5)
NEUTROPHILS NFR BLD AUTO: 67.6 % — SIGNIFICANT CHANGE UP (ref 42.2–75.2)
NRBC # BLD: 0 /100 WBCS — SIGNIFICANT CHANGE UP (ref 0–0)
PHOSPHATE SERPL-MCNC: 3.2 MG/DL — SIGNIFICANT CHANGE UP (ref 2.1–4.9)
PLATELET # BLD AUTO: 167 K/UL — SIGNIFICANT CHANGE UP (ref 130–400)
PMV BLD: 11.1 FL — HIGH (ref 7.4–10.4)
POTASSIUM SERPL-MCNC: 4.5 MMOL/L — SIGNIFICANT CHANGE UP (ref 3.5–5)
POTASSIUM SERPL-SCNC: 4.5 MMOL/L — SIGNIFICANT CHANGE UP (ref 3.5–5)
RBC # BLD: 4.26 M/UL — LOW (ref 4.7–6.1)
RBC # FLD: 14.3 % — SIGNIFICANT CHANGE UP (ref 11.5–14.5)
SODIUM SERPL-SCNC: 139 MMOL/L — SIGNIFICANT CHANGE UP (ref 135–146)
WBC # BLD: 8.48 K/UL — SIGNIFICANT CHANGE UP (ref 4.8–10.8)
WBC # FLD AUTO: 8.48 K/UL — SIGNIFICANT CHANGE UP (ref 4.8–10.8)

## 2023-07-25 PROCEDURE — 99233 SBSQ HOSP IP/OBS HIGH 50: CPT

## 2023-07-25 RX ORDER — ACETAMINOPHEN 500 MG
2 TABLET ORAL
Qty: 0 | Refills: 0 | DISCHARGE
Start: 2023-07-25

## 2023-07-25 RX ORDER — CHLORHEXIDINE GLUCONATE 213 G/1000ML
1 SOLUTION TOPICAL DAILY
Refills: 0 | Status: DISCONTINUED | OUTPATIENT
Start: 2023-07-25 | End: 2023-07-25

## 2023-07-25 RX ADMIN — LISINOPRIL 20 MILLIGRAM(S): 2.5 TABLET ORAL at 06:46

## 2023-07-25 RX ADMIN — CHLORHEXIDINE GLUCONATE 1 APPLICATION(S): 213 SOLUTION TOPICAL at 06:16

## 2023-07-25 RX ADMIN — Medication 650 MILLIGRAM(S): at 01:50

## 2023-07-25 RX ADMIN — Medication 100 MILLIGRAM(S): at 06:46

## 2023-07-25 RX ADMIN — PANTOPRAZOLE SODIUM 40 MILLIGRAM(S): 20 TABLET, DELAYED RELEASE ORAL at 06:46

## 2023-07-25 NOTE — DISCHARGE NOTE NURSING/CASE MANAGEMENT/SOCIAL WORK - PATIENT PORTAL LINK FT
You can access the FollowMyHealth Patient Portal offered by [facility] by registering at the following website: [email or web address]. By joining [employer]’s FollowMyHealth portal, you will also be able to view your health information using other applications (apps) compatible with our system.

## 2023-07-25 NOTE — PROGRESS NOTE ADULT - ASSESSMENT
77 y male with PMHx of HTN, HLD, degenerative disc disease, AAA, smoker presented for EVAR.  Ambulates, tolerates and regular diet      Plan:  - I reviewed labs  - I reviewed radiology imagings  - I personally visualized the imagings  - I discussed the findings and imagings with Dr. Paniagua:  - anticipate discharge home today  - resume home medications    SPECTRA [id number]  
Assessment & Plan    77y Male  POD#0 s/p EVAR     NEURO:  #Acute pain    -APAP prn  #Insomnia   -Trazodone 50 mg bedtime      RESP:   #Smoker 0.25 PPD x50 years   #Oxygenation    -wean off NC to RA as tolerated    -CXR     -IS 10 times every hour while awake   #Activity    -increase as tolerated    CARDS:   #AAA    -CTAP significant for increased size 5.1 cm infrarenal abdominal aortic aneurysm with extensive mural thrombus (previously 4.2 cm.)     -s/p EVAR    -BP goal: 110-160    -If BP >160, enalaprilat 1.25q6 PRN     -Q1 vascular checks     -Pulse exam: DP and PT signals bilaterally , palpable   #Hx of HTN, HLD    -home Lisinopril 20 mg     -home Simvastatin 80 mg (Atorvastatin 40 mg inpatient)  Labs:   trop neg x1   EKG post op: NSR        GI/NUTR:   #Diet, Clear Liquid, advance diet as tolerated per vascular     -aspiration precautions  #GI Prophylaxis    -Protonix (home Esomeprazole 40 mg)  #Bowel regimen    -not indicated     /RENAL:   #urine output in critically ill    -indwelling catheter    -D/c tsai at midnight per vascular     Labs:          BUN/Cr- 16/1.0  -->          Electrolytes-Na 139 // K 4.2 // Mg 1.7 //  Phos 3.1 (07-24 @ 16:20), 2 g Mg repleted     HEME/ONC:   -Holding home ASA and holding DVT ppx. F/u restarting tmw with vascular     Labs: Hb/Hct:  12.2/37.3  (07-24 @ 16:20)  -->                      Plts:  166  -->                 PTT/INR:        ID:  WBC- 6.79  --->>  Temp trend- 24hrs T(F): 99.1 (07-24 @ 16:50), Max: 99.1 (07-24 @ 16:50)  Current antibiotics-ceFAZolin   IVPB 2000 every 8 hours    ENDO:    -Glucose goal 140-180    -if above 180 start ISS     HA1C     MSK:     -Activity - Lay flat x4 hours, bedrest tonight. Advance activity tomorrow per vascular   #Degenerative Disc Disease     -Back Surgery     LINES/DRAINS:  PIV, Tsai( 7/24-), left radial a line     ADVANCED DIRECTIVES:  Full Code    HCP/Emergency Contact-    INDICATION FOR SICU/SDU: Abdominal aortic aneurysm (AAA) without rupture           DISPO:   SICU. Case discussed with attending Dr. Mccoy

## 2023-07-25 NOTE — DISCHARGE NOTE PROVIDER - NSDCMRMEDTOKEN_GEN_ALL_CORE_FT
acetaminophen 325 mg oral tablet: 2 tab(s) orally every 6 hours As needed Mild Pain (1 - 3)  esomeprazole 40 mg oral delayed release capsule: 1 orally once a day (at bedtime)  lisinopril 20 mg oral tablet: 1 orally once a day  simvastatin 80 mg oral tablet: 1 orally once a day (at bedtime)  traZODone 50 mg oral tablet: orally once a day (at bedtime)

## 2023-07-25 NOTE — DISCHARGE NOTE PROVIDER - NSDCFUSCHEDAPPT_GEN_ALL_CORE_FT
Landry Thakkar  [facility] Physician [facility]  CARDIOLOGY [address]  Scheduled Appointment: 08/24/2023    Leo Ledesma  [facility] PreAdmits  Scheduled Appointment: 10/02/2023    [facility]  [facility] [address]  Scheduled Appointment: 10/02/2023    Leo Ledesma  [facility] PreAdmits  Scheduled Appointment: 10/05/2023    Leo Ledesma  [facility]  [facility] [address]  Scheduled Appointment: 10/05/2023

## 2023-07-25 NOTE — PROGRESS NOTE ADULT - SUBJECTIVE AND OBJECTIVE BOX
VASCULAR SURGERY PROGRESS NOTE    CC: admitted s/p EVAR  Hospital Day #1  Post-Op Day #1    Procedure: s/p EVAR    Events of past 24 hours: seen and examined this AM. Denies any complains          ROS otherwise negative except per subjective and HPI      PAST MEDICAL & SURGICAL HISTORY:  HTN (hypertension)      Abdominal aortic aneurysm      Elevated lipids      History of degenerative disc disease      History of back surgery          Vital Signs Last 24 Hrs  T(C): 36.1 (25 Jul 2023 07:40), Max: 37.3 (24 Jul 2023 16:50)  T(F): 96.9 (25 Jul 2023 07:40), Max: 99.1 (24 Jul 2023 16:50)  HR: 67 (25 Jul 2023 07:40) (59 - 84)  BP: 147/69 (25 Jul 2023 07:40) (118/73 - 164/78)  BP(mean): 99 (25 Jul 2023 07:40) (76 - 99)  RR: 20 (25 Jul 2023 07:40) (12 - 20)  SpO2: 98% (25 Jul 2023 07:40) (93% - 100%)    Parameters below as of 25 Jul 2023 07:40  Patient On (Oxygen Delivery Method): room air        Pain (0-10):            Pain Control Adequate: [] YES [] N    Diet: regular    I&O's Detail    24 Jul 2023 07:01  -  25 Jul 2023 07:00  --------------------------------------------------------  IN:    IV PiggyBack: 50 mL  Total IN: 50 mL    OUT:    Indwelling Catheter - Urethral (mL): 1800 mL    Voided (mL): 300 mL  Total OUT: 2100 mL    Total NET: -2050 mL      25 Jul 2023 07:01  -  25 Jul 2023 09:27  --------------------------------------------------------  IN:    Oral Fluid: 360 mL  Total IN: 360 mL    OUT:    Voided (mL): 200 mL  Total OUT: 200 mL    Total NET: 160 mL          Bowel Movement: : [] YES [x] NO  Flatus: : [x] YES [] NO    PHYSICAL EXAM    Appearance: Normal	  HEENT:   Normal oral mucosa, PERRL, EOMI	  Neck: Supple, - JVD;   Cardiovascular: Normal S1 S2, No JVD, No murmurs,   Respiratory: Lungs clear to auscultation, No Rales, Rhonchi, Wheezing	  Gastrointestinal:  Soft, Non-tender, + BS	  Skin: No rashes, No ecchymoses, No cyanosis  Extremities: Normal range of motion, No clubbing, cyanosis or edema  b/l groins: no hematoma/swelling, + dressings  Neurologic: Non-focal  Psychiatry: A & O x 3, Mood & affect appropriate    PULSES:  Femoral:  Popliteal:  Dorsal Pedal: palpable b/l  Posterior Tibial: palpable b/l  Capillary:      MEDICATIONS:   MEDICATIONS  (STANDING):  atorvastatin 40 milliGRAM(s) Oral at bedtime  chlorhexidine 4% Liquid 1 Application(s) Topical <User Schedule>  lisinopril 20 milliGRAM(s) Oral daily  pantoprazole    Tablet 40 milliGRAM(s) Oral before breakfast  traZODone 50 milliGRAM(s) Oral at bedtime    MEDICATIONS  (PRN):  acetaminophen     Tablet .. 650 milliGRAM(s) Oral every 6 hours PRN Mild Pain (1 - 3)  enalaprilat Injectable 1.25 milliGRAM(s) IV Push every 6 hours PRN BP >160      DVT PROPHYLAXIS: [] YES [] NO  GI PROPHYLAXIS: [] YES [] NO  ANTIPLATELETS: [] YES [] NO  ANTICOAGULATION: [] YES [] NO  ANTIBIOTICS: [] YES [] NO    LAB/STUDIES:                        12.8   8.48  )-----------( 167      ( 25 Jul 2023 05:30 )             38.0     07-25    139  |  107  |  15  ----------------------------<  99  4.5   |  23  |  1.0    Ca    8.3<L>      25 Jul 2023 05:30  Phos  3.2     07-25  Mg     2.0     07-25            Urinalysis Basic - ( 25 Jul 2023 05:30 )    Color: x / Appearance: x / SG: x / pH: x  Gluc: 99 mg/dL / Ketone: x  / Bili: x / Urobili: x   Blood: x / Protein: x / Nitrite: x   Leuk Esterase: x / RBC: x / WBC x   Sq Epi: x / Non Sq Epi: x / Bacteria: x      CARDIAC MARKERS ( 24 Jul 2023 16:20 )  x     / <0.01 ng/mL / 54 U/L / x     / 1.6 ng/mL                IMAGING:    Xray Chest 1 View-PORTABLE IMMEDIATE (Xray Chest 1 View-PORTABLE IMMEDIATE .) (07.24.23 @ 16:36) >  No radiographic evidence of acute cardiopulmonary disease.

## 2023-07-25 NOTE — PROGRESS NOTE ADULT - SUBJECTIVE AND OBJECTIVE BOX
MEDARDO GUTIERREZ     77y Male    MRN-[id number]    Admit Date: 07-24-23  Hospital LOS:   ICU Admission Date: 7/24/2023  OR #1-        ============================  HPI: This is a 77 y male with PMHx of HTN, HLD, degenerative disc disease, AAA, smoker presenting today s/p EVAR. CTAP significant for increased size 5.1 cm infrarenal abdominal aortic aneurysm with extensive mural thrombus (previously 4.2 cm.) Patient denies any chest pain, shortness of breath, nausea, tingling, numbness, or pain at this time. Seen and examined in PACU, /70s, PT and DP signals bilaterally.       Procedure:  OR Stats  OR Time: 1 hr, 25 min              EBL:  50        IV Fluids: 2.2 L      Blood Products: None               UOP: 250      Findings- operation; evar AFX2         24 Hour Events  -Admission under SICU service    7/24/2023  Night  -Following commands  -Bilateral PT/DP palpable pulses  D/c tsai ?     Day  Admit to SICU   2g Mg       [X] A ten-point review of systems was otherwise negative except as noted above.  [  ] Due to altered mental status/intubation, subjective information was not attained from the patient. History was obtained, to the extent possible, from review of the chart and collateral sources of information.    =========================================================================================================================================   MEDARDO GUTIERREZ     77y Male    MRN-[id number]    Admit Date: 07-24-23  Hospital LOS:   ICU Admission Date: 7/24/2023  OR #1-        ============================  HPI: This is a 77 y male with PMHx of HTN, HLD, degenerative disc disease, AAA, smoker presenting today s/p EVAR. CTAP significant for increased size 5.1 cm infrarenal abdominal aortic aneurysm with extensive mural thrombus (previously 4.2 cm.) Patient denies any chest pain, shortness of breath, nausea, tingling, numbness, or pain at this time. Seen and examined in PACU, /70s, PT and DP signals bilaterally.       Procedure:  OR Stats  OR Time: 1 hr, 25 min              EBL:  50        IV Fluids: 2.2 L      Blood Products: None               UOP: 250      Findings- operation; evar AFX2         24 Hour Events  -Admission under SICU service    7/24/2023  Night  -Following commands  -Bilateral PT/DP palpable pulses  D/c tsai ?     Day  Admit to SICU   2g Mg       [X] A ten-point review of systems was otherwise negative except as noted above.  [  ] Due to altered mental status/intubation, subjective information was not attained from the patient. History was obtained, to the extent possible, from review of the chart and collateral sources of information.    =========================================================================================================================================  Daily Height in cm: 170.18 (24 Jul 2023 12:28)    Daily   Diet, DASH/TLC:   Sodium & Cholesterol Restricted (07-25-23 @ 05:59)    CURRENT MEDS:  Neurologic Medications  acetaminophen     Tablet .. 650 milliGRAM(s) Oral every 6 hours PRN Mild Pain (1 - 3)  traZODone 50 milliGRAM(s) Oral at bedtime    Respiratory Medications    Cardiovascular Medications  lisinopril 20 milliGRAM(s) Oral daily    Gastrointestinal Medications  pantoprazole    Tablet 40 milliGRAM(s) Oral before breakfast    Genitourinary Medications    Hematologic/Oncologic Medications    Antimicrobial/Immunologic Medications    Endocrine/Metabolic Medications  atorvastatin 40 milliGRAM(s) Oral at bedtime    Topical/Other Medications  chlorhexidine 4% Liquid 1 Application(s) Topical <User Schedule>    ICU Vital Signs Last 24 Hrs  T(C): 36.3 (25 Jul 2023 11:45), Max: 37.3 (24 Jul 2023 16:50)  T(F): 97.4 (25 Jul 2023 11:45), Max: 99.1 (24 Jul 2023 16:50)  HR: 70 (25 Jul 2023 11:45) (59 - 84)  BP: 138/80 (25 Jul 2023 11:45) (118/73 - 164/78)  BP(mean): 104 (25 Jul 2023 11:45) (76 - 104)  ABP: 144/66 (25 Jul 2023 10:35) (104/51 - 164/86)  ABP(mean): 95 (25 Jul 2023 10:35) (72 - 116)  RR: 20 (25 Jul 2023 11:45) (12 - 20)  SpO2: 94% (25 Jul 2023 11:45) (93% - 100%)    O2 Parameters below as of 25 Jul 2023 11:45  Patient On (Oxygen Delivery Method): room air              I&O's Summary    24 Jul 2023 07:01  -  25 Jul 2023 07:00  --------------------------------------------------------  IN: 50 mL / OUT: 2100 mL / NET: -2050 mL    25 Jul 2023 07:01  -  25 Jul 2023 12:18  --------------------------------------------------------  IN: 360 mL / OUT: 650 mL / NET: -290 mL      I&O's Detail    24 Jul 2023 07:01  -  25 Jul 2023 07:00  --------------------------------------------------------  IN:    IV PiggyBack: 50 mL  Total IN: 50 mL    OUT:    Indwelling Catheter - Urethral (mL): 1800 mL    Voided (mL): 300 mL  Total OUT: 2100 mL    Total NET: -2050 mL      25 Jul 2023 07:01  -  25 Jul 2023 12:18  --------------------------------------------------------  IN:    Oral Fluid: 360 mL  Total IN: 360 mL    OUT:    Voided (mL): 650 mL  Total OUT: 650 mL    Total NET: -290 mL              acetaminophen     Tablet .. 650 milliGRAM(s) Oral every 6 hours PRN  atorvastatin 40 milliGRAM(s) Oral at bedtime  chlorhexidine 4% Liquid 1 Application(s) Topical <User Schedule>  lisinopril 20 milliGRAM(s) Oral daily  pantoprazole    Tablet 40 milliGRAM(s) Oral before breakfast  traZODone 50 milliGRAM(s) Oral at bedtime                  PHYSICAL EXAM:   a&ox3  follows commands, VITAL, no acute distress  RRR, palpable DP and PT bilaterally, b/l groin dressings c/d/i  equal chest rise b/l  abdomen soft, nontender, nondistended   extremities soft

## 2023-07-25 NOTE — DISCHARGE NOTE PROVIDER - NSDCCPCAREPLAN_GEN_ALL_CORE_FT
PRINCIPAL DISCHARGE DIAGNOSIS  Diagnosis: AAA (abdominal aortic aneurysm)  Assessment and Plan of Treatment: s/p EVAR. Remove groin dressings Wed. May shower. Do not use hot tub. No driving or heavy lifting. May ambulate, use stairs. Go to ED with abd or back pain, leg numbness, nausea, vomiting, fevers, or chills

## 2023-07-25 NOTE — DISCHARGE NOTE PROVIDER - NSDCCPTREATMENT_GEN_ALL_CORE_FT
PRINCIPAL PROCEDURE  Procedure: Endovascular repair of aortoiliac aneurysm  Findings and Treatment:

## 2023-07-25 NOTE — DISCHARGE NOTE PROVIDER - CARE PROVIDER_API CALL
Myke Paniagua  Vascular Surgery  [address] [place] [address], [address]  [place] [address]  Phone: [phone]  Fax: [phone]  Follow Up Time: 2 weeks

## 2023-07-25 NOTE — DISCHARGE NOTE PROVIDER - HOSPITAL COURSE
77 y male with PMHx of HTN, HLD, degenerative disc disease, AAA, smoker presented for elective EVAR, underwent procedure without any complication. CTAP significant for increased size 5.1 cm infrarenal abdominal aortic aneurysm with extensive mural thrombus (previously 4.2 cm.) Patient denies any chest pain, shortness of breath, nausea, tingling, numbness, or pain at this time. Pt was observed overnight. POD 1 ambulated, tolerated regular diet and voided.

## 2023-07-30 DIAGNOSIS — E78.5 HYPERLIPIDEMIA, UNSPECIFIED: ICD-10-CM

## 2023-07-30 DIAGNOSIS — Z98.890 OTHER SPECIFIED POSTPROCEDURAL STATES: ICD-10-CM

## 2023-07-30 DIAGNOSIS — F17.200 NICOTINE DEPENDENCE, UNSPECIFIED, UNCOMPLICATED: ICD-10-CM

## 2023-07-30 DIAGNOSIS — I71.40 ABDOMINAL AORTIC ANEURYSM, WITHOUT RUPTURE, UNSPECIFIED: ICD-10-CM

## 2023-07-30 DIAGNOSIS — I10 ESSENTIAL (PRIMARY) HYPERTENSION: ICD-10-CM

## 2023-07-30 DIAGNOSIS — G47.00 INSOMNIA, UNSPECIFIED: ICD-10-CM

## 2023-08-11 ENCOUNTER — APPOINTMENT (OUTPATIENT)
Dept: VASCULAR SURGERY | Facility: CLINIC | Age: 78
End: 2023-08-11
Payer: MEDICARE

## 2023-08-11 VITALS
SYSTOLIC BLOOD PRESSURE: 137 MMHG | HEIGHT: 68 IN | WEIGHT: 193 LBS | BODY MASS INDEX: 29.25 KG/M2 | DIASTOLIC BLOOD PRESSURE: 79 MMHG

## 2023-08-11 PROCEDURE — 93978 VASCULAR STUDY: CPT

## 2023-08-11 PROCEDURE — 99024 POSTOP FOLLOW-UP VISIT: CPT

## 2023-08-11 NOTE — DATA REVIEWED
[FreeTextEntry1] : I performed an arterial duplex which was medically necessary to evaluate for endoleak. It showed patent endograft, no endoleak, residual sac 4.7 cm.

## 2023-08-11 NOTE — HISTORY OF PRESENT ILLNESS
[FreeTextEntry1] : 76 y/o M with 5 cm, s/p EVAR - 7/24/23. Presents for follow up, denies any complaints.

## 2023-08-11 NOTE — ASSESSMENT
[FreeTextEntry1] : 78 y/o M with 5 cm, s/p EVAR - 7/24/23. Aortic duplex showed patent endograft, no endoleak, residual sac 4.7 cm.  He will follow up in one week for carotid duplex.  He will follow up in six months for aortic duplex.

## 2023-08-25 ENCOUNTER — APPOINTMENT (OUTPATIENT)
Dept: VASCULAR SURGERY | Facility: CLINIC | Age: 78
End: 2023-08-25
Payer: MEDICARE

## 2023-08-25 PROCEDURE — 93925 LOWER EXTREMITY STUDY: CPT

## 2023-08-25 PROCEDURE — 93880 EXTRACRANIAL BILAT STUDY: CPT

## 2023-10-05 ENCOUNTER — APPOINTMENT (OUTPATIENT)
Dept: HEMATOLOGY ONCOLOGY | Facility: CLINIC | Age: 78
End: 2023-10-05

## 2023-10-11 ENCOUNTER — OUTPATIENT (OUTPATIENT)
Dept: OUTPATIENT SERVICES | Facility: HOSPITAL | Age: 78
LOS: 1 days | End: 2023-10-11
Payer: MEDICARE

## 2023-10-11 ENCOUNTER — LABORATORY RESULT (OUTPATIENT)
Age: 78
End: 2023-10-11

## 2023-10-11 ENCOUNTER — APPOINTMENT (OUTPATIENT)
Dept: HEMATOLOGY ONCOLOGY | Facility: CLINIC | Age: 78
End: 2023-10-11

## 2023-10-11 DIAGNOSIS — D64.9 ANEMIA, UNSPECIFIED: ICD-10-CM

## 2023-10-11 DIAGNOSIS — Z98.890 OTHER SPECIFIED POSTPROCEDURAL STATES: Chronic | ICD-10-CM

## 2023-10-11 LAB
ALBUMIN SERPL ELPH-MCNC: 4.4 G/DL
ALP BLD-CCNC: 65 U/L
ALT SERPL-CCNC: 7 U/L
ANION GAP SERPL CALC-SCNC: 12 MMOL/L
AST SERPL-CCNC: 10 U/L
BILIRUB DIRECT SERPL-MCNC: <0.2 MG/DL
BILIRUB INDIRECT SERPL-MCNC: >0.1 MG/DL
BILIRUB SERPL-MCNC: 0.3 MG/DL
BUN SERPL-MCNC: 19 MG/DL
CALCIUM SERPL-MCNC: 9.1 MG/DL
CHLORIDE SERPL-SCNC: 104 MMOL/L
CO2 SERPL-SCNC: 23 MMOL/L
CREAT SERPL-MCNC: 1.2 MG/DL
EGFR: 62 ML/MIN/1.73M2
GLUCOSE SERPL-MCNC: 98 MG/DL
HCT VFR BLD CALC: 42.8 %
HGB BLD-MCNC: 14 G/DL
IRON SATN MFR SERPL: 16 %
IRON SERPL-MCNC: 51 UG/DL
MCHC RBC-ENTMCNC: 28.7 PG
MCHC RBC-ENTMCNC: 32.7 G/DL
MCV RBC AUTO: 87.7 FL
PLATELET # BLD AUTO: 214 K/UL
PMV BLD: 9.9 FL
POTASSIUM SERPL-SCNC: 5 MMOL/L
PROT SERPL-MCNC: 7.4 G/DL
RBC # BLD: 4.88 M/UL
RBC # FLD: 14.3 %
SODIUM SERPL-SCNC: 139 MMOL/L
TIBC SERPL-MCNC: 324 UG/DL
UIBC SERPL-MCNC: 273 UG/DL
WBC # FLD AUTO: 6.55 K/UL

## 2023-10-11 PROCEDURE — 82728 ASSAY OF FERRITIN: CPT

## 2023-10-11 PROCEDURE — 80048 BASIC METABOLIC PNL TOTAL CA: CPT

## 2023-10-11 PROCEDURE — 36415 COLL VENOUS BLD VENIPUNCTURE: CPT

## 2023-10-11 PROCEDURE — 83550 IRON BINDING TEST: CPT

## 2023-10-11 PROCEDURE — 83540 ASSAY OF IRON: CPT

## 2023-10-11 PROCEDURE — 85027 COMPLETE CBC AUTOMATED: CPT

## 2023-10-11 PROCEDURE — 80076 HEPATIC FUNCTION PANEL: CPT

## 2023-10-12 DIAGNOSIS — D64.9 ANEMIA, UNSPECIFIED: ICD-10-CM

## 2023-10-12 LAB — FERRITIN SERPL-MCNC: 21 NG/ML

## 2023-10-17 ENCOUNTER — RX RENEWAL (OUTPATIENT)
Age: 78
End: 2023-10-17

## 2023-10-19 ENCOUNTER — APPOINTMENT (OUTPATIENT)
Dept: HEMATOLOGY ONCOLOGY | Facility: CLINIC | Age: 78
End: 2023-10-19
Payer: MEDICARE

## 2023-10-19 ENCOUNTER — OUTPATIENT (OUTPATIENT)
Dept: OUTPATIENT SERVICES | Facility: HOSPITAL | Age: 78
LOS: 1 days | End: 2023-10-19
Payer: MEDICARE

## 2023-10-19 DIAGNOSIS — D64.9 ANEMIA, UNSPECIFIED: ICD-10-CM

## 2023-10-19 DIAGNOSIS — Z98.890 OTHER SPECIFIED POSTPROCEDURAL STATES: Chronic | ICD-10-CM

## 2023-10-19 PROCEDURE — 99214 OFFICE O/P EST MOD 30 MIN: CPT | Mod: 95

## 2023-10-19 RX ORDER — FERROUS SULFATE TAB EC 325 MG (65 MG FE EQUIVALENT) 325 (65 FE) MG
325 (65 FE) TABLET DELAYED RESPONSE ORAL
Qty: 30 | Refills: 2 | Status: ACTIVE | COMMUNITY
Start: 2023-10-19 | End: 1900-01-01

## 2023-11-30 ENCOUNTER — APPOINTMENT (OUTPATIENT)
Dept: CARDIOLOGY | Facility: CLINIC | Age: 78
End: 2023-11-30
Payer: MEDICARE

## 2023-11-30 VITALS
HEIGHT: 68 IN | SYSTOLIC BLOOD PRESSURE: 112 MMHG | HEART RATE: 86 BPM | BODY MASS INDEX: 29.4 KG/M2 | WEIGHT: 194 LBS | DIASTOLIC BLOOD PRESSURE: 70 MMHG

## 2023-11-30 PROCEDURE — 99214 OFFICE O/P EST MOD 30 MIN: CPT | Mod: 25

## 2023-11-30 PROCEDURE — 93000 ELECTROCARDIOGRAM COMPLETE: CPT

## 2023-12-07 ENCOUNTER — LABORATORY RESULT (OUTPATIENT)
Age: 78
End: 2023-12-07

## 2023-12-07 ENCOUNTER — OUTPATIENT (OUTPATIENT)
Dept: OUTPATIENT SERVICES | Facility: HOSPITAL | Age: 78
LOS: 1 days | End: 2023-12-07
Payer: MEDICARE

## 2023-12-07 ENCOUNTER — APPOINTMENT (OUTPATIENT)
Dept: HEMATOLOGY ONCOLOGY | Facility: CLINIC | Age: 78
End: 2023-12-07

## 2023-12-07 DIAGNOSIS — D50.9 IRON DEFICIENCY ANEMIA, UNSPECIFIED: ICD-10-CM

## 2023-12-07 DIAGNOSIS — Z98.890 OTHER SPECIFIED POSTPROCEDURAL STATES: Chronic | ICD-10-CM

## 2023-12-07 LAB
HCT VFR BLD CALC: 42 %
HGB BLD-MCNC: 14.1 G/DL
MCHC RBC-ENTMCNC: 28.8 PG
MCHC RBC-ENTMCNC: 33.6 G/DL
MCV RBC AUTO: 85.9 FL
PLATELET # BLD AUTO: 194 K/UL
PMV BLD: 10.1 FL
RBC # BLD: 4.89 M/UL
RBC # FLD: 14.7 %
WBC # FLD AUTO: 6.69 K/UL

## 2023-12-07 PROCEDURE — 83550 IRON BINDING TEST: CPT

## 2023-12-07 PROCEDURE — 36415 COLL VENOUS BLD VENIPUNCTURE: CPT

## 2023-12-07 PROCEDURE — 85027 COMPLETE CBC AUTOMATED: CPT

## 2023-12-07 PROCEDURE — 82728 ASSAY OF FERRITIN: CPT

## 2023-12-07 PROCEDURE — 83540 ASSAY OF IRON: CPT

## 2023-12-08 DIAGNOSIS — D50.9 IRON DEFICIENCY ANEMIA, UNSPECIFIED: ICD-10-CM

## 2023-12-08 LAB
FERRITIN SERPL-MCNC: 37 NG/ML
IRON SATN MFR SERPL: 16 %
IRON SERPL-MCNC: 56 UG/DL
TIBC SERPL-MCNC: 340 UG/DL
UIBC SERPL-MCNC: 284 UG/DL

## 2023-12-12 NOTE — PHYSICAL THERAPY INITIAL EVALUATION ADULT - ASR WT BEARING STATUS EVAL
no weight-bearing restrictions Alert and oriented to person, place and time/Patient baseline mental status/Awake

## 2023-12-13 ENCOUNTER — APPOINTMENT (OUTPATIENT)
Dept: VASCULAR SURGERY | Facility: CLINIC | Age: 78
End: 2023-12-13
Payer: MEDICARE

## 2023-12-13 VITALS
WEIGHT: 190 LBS | HEIGHT: 68 IN | DIASTOLIC BLOOD PRESSURE: 77 MMHG | BODY MASS INDEX: 28.79 KG/M2 | SYSTOLIC BLOOD PRESSURE: 121 MMHG

## 2023-12-13 PROCEDURE — 93978 VASCULAR STUDY: CPT

## 2023-12-13 PROCEDURE — 99212 OFFICE O/P EST SF 10 MIN: CPT

## 2023-12-13 NOTE — ASSESSMENT
[FreeTextEntry1] : S/p EVAR no complaints  duplex- 4.3 cm AAA sac follow up in 6 months   Thank you for allowing me to participate in the care of your patient.   Sincerely,  Myke Paniagua MD, RPVI, FACS Associate Professor of Surgery , Vascular Fellowship Director of Limb Salvage Surgery [facility] at [facility]

## 2023-12-13 NOTE — ASSESSMENT
[FreeTextEntry1] : S/p EVAR no complaints  duplex- 4.3 cm AAA sac follow up in 6 months   Thank you for allowing me to participate in the care of your patient.   Sincerely,  Myke Paniagua MD, RPVI, FACS Associate Professor of Surgery , Vascular Fellowship Director of Limb Salvage Surgery [facility] at [facility]

## 2023-12-15 ENCOUNTER — APPOINTMENT (OUTPATIENT)
Dept: HEMATOLOGY ONCOLOGY | Facility: CLINIC | Age: 78
End: 2023-12-15
Payer: MEDICARE

## 2023-12-15 ENCOUNTER — OUTPATIENT (OUTPATIENT)
Dept: OUTPATIENT SERVICES | Facility: HOSPITAL | Age: 78
LOS: 1 days | End: 2023-12-15
Payer: MEDICARE

## 2023-12-15 DIAGNOSIS — D50.9 IRON DEFICIENCY ANEMIA, UNSPECIFIED: ICD-10-CM

## 2023-12-15 DIAGNOSIS — Z98.890 OTHER SPECIFIED POSTPROCEDURAL STATES: Chronic | ICD-10-CM

## 2023-12-15 PROCEDURE — 99213 OFFICE O/P EST LOW 20 MIN: CPT | Mod: 95

## 2023-12-15 NOTE — HISTORY OF PRESENT ILLNESS
[de-identified] : Patient is a 76 yo M with PMHx AAA undergoing observation with vascular surgery presents to clinic for evaluation of anemia. Review of blood work shows that patient has a history of progressive anemia. Hemoglobin in 2016 was 13.9 with MCV of 86.9. Since then, he has had gradual onset of microcytic anemia with progressively decreasing hemoglobin levels (starting from October 2019). Most recent blood work done 1 week ago shows a Hb of 9.9, HCt 34.3, and MCV 78.3. Iron studies were done in February 2020 and showed Iron 24, TIBC 423, and Ferritin 11. Patient otherwise feels well. He denies any fatigue, shortness of breath, chest pain, abdominal pain, or blood in his stools. His last colonoscopy and endoscopy was 1 year ago with Dr. Erazo and reportedly was normal. He says he was told he could follow in 5 years for possible re-scoping. He continues to work and is fully functional at baseline.  [de-identified] : \par  4/30/21\par  feels well, no sx\par  f/u 4 mo and repeat labs\par  endoscopy 2/21\par  colonoscopy 2 yrs ago - polyps\par  f/u Dr Erazo\par

## 2023-12-15 NOTE — CONSULT LETTER
[Dear  ___] : Dear  [unfilled], [Consult Letter:] : I had the pleasure of evaluating your patient, [unfilled]. [Please see my note below.] : Please see my note below. [Consult Closing:] : Thank you very much for allowing me to participate in the care of this patient.  If you have any questions, please do not hesitate to contact me. [Sincerely,] : Sincerely, [FreeTextEntry2] : Jadon Villalba MD [FreeTextEntry3] : Leo Ledesma DO\par  Attending Physician,\par  Hematology/ Medical Oncology\par  [phone] office\par  \par

## 2023-12-15 NOTE — ASSESSMENT
[FreeTextEntry1] : 77 YO man seen by me for iron deficiency anemia s/p aortic aneurism repair 07/2023 iron deficiency worsened; would offer IV VENOFER; however, he now recuperates in [place]; 10-19-23 start oral iron 1 tab every other day and there is nice improvement of iron stores; continue the same management

## 2023-12-15 NOTE — REASON FOR VISIT
[Follow-Up Visit] : a follow-up visit for [Home] : at home, [unfilled] , at the time of the visit. [Medical Office: ([facility])___] : at the medical office located in  [Verbal consent obtained from patient] : the patient, [unfilled]

## 2024-02-14 ENCOUNTER — APPOINTMENT (OUTPATIENT)
Dept: HEMATOLOGY ONCOLOGY | Facility: CLINIC | Age: 79
End: 2024-02-14

## 2024-02-21 ENCOUNTER — APPOINTMENT (OUTPATIENT)
Dept: HEMATOLOGY ONCOLOGY | Facility: CLINIC | Age: 79
End: 2024-02-21

## 2024-02-21 NOTE — ASSESSMENT
[FreeTextEntry1] : # iron deficiency anemia - s/p aortic aneurism repair 07/2023 - iron deficiency worsened; would offer IV VENOFER; however, he now recuperates in [place] - 10-19-23 start oral iron 1 tab every other day and there is nice improvement of iron stores; continue the same management  RTC in

## 2024-02-21 NOTE — CONSULT LETTER
[Dear  ___] : Dear  [unfilled], [Consult Letter:] : I had the pleasure of evaluating your patient, [unfilled]. [Please see my note below.] : Please see my note below. [Consult Closing:] : Thank you very much for allowing me to participate in the care of this patient.  If you have any questions, please do not hesitate to contact me. [Sincerely,] : Sincerely, [FreeTextEntry2] : Jadon Villalba MD [FreeTextEntry3] : Leo Ledesma DO\par  Attending Physician,\par  Hematology/ Medical Oncology\par  [phone] office\par  \par

## 2024-02-21 NOTE — HISTORY OF PRESENT ILLNESS
[de-identified] : Patient is a 74 yo M with PMHx AAA undergoing observation with vascular surgery presents to clinic for evaluation of anemia. Review of blood work shows that patient has a history of progressive anemia. Hemoglobin in 2016 was 13.9 with MCV of 86.9. Since then, he has had gradual onset of microcytic anemia with progressively decreasing hemoglobin levels (starting from October 2019). Most recent blood work done 1 week ago shows a Hb of 9.9, HCt 34.3, and MCV 78.3. Iron studies were done in February 2020 and showed Iron 24, TIBC 423, and Ferritin 11. Patient otherwise feels well. He denies any fatigue, shortness of breath, chest pain, abdominal pain, or blood in his stools. His last colonoscopy and endoscopy was 1 year ago with Dr. Erazo and reportedly was normal. He says he was told he could follow in 5 years for possible re-scoping. He continues to work and is fully functional at baseline.  [de-identified] : 4/30/21 feels well, no sx f/u 4 mo and repeat labs endoscopy 2/21 colonoscopy 2 yrs ago - polyps f/u Dr Erazo  2/21/24

## 2024-04-14 ENCOUNTER — RX RENEWAL (OUTPATIENT)
Age: 79
End: 2024-04-14

## 2024-05-15 ENCOUNTER — APPOINTMENT (OUTPATIENT)
Dept: VASCULAR SURGERY | Facility: CLINIC | Age: 79
End: 2024-05-15
Payer: MEDICARE

## 2024-05-15 VITALS — DIASTOLIC BLOOD PRESSURE: 80 MMHG | SYSTOLIC BLOOD PRESSURE: 120 MMHG | HEART RATE: 87 BPM

## 2024-05-15 VITALS — WEIGHT: 194 LBS | HEIGHT: 68 IN | BODY MASS INDEX: 29.4 KG/M2

## 2024-05-15 PROCEDURE — 93978 VASCULAR STUDY: CPT

## 2024-05-15 PROCEDURE — 99212 OFFICE O/P EST SF 10 MIN: CPT

## 2024-05-15 NOTE — ASSESSMENT
[FreeTextEntry1] : S/p EVAR no complaints  duplex- 4.2 cm AAA sac no evidence of endoleak. follow up in 4 months    I, Dr. Paniagua, personally performed the evaluation and management (E/M) services for this established patient who presents today with (a) new problem(s)/exacerbation of (an) existing condition(s). That E/M includes conducting the clinically appropriate interval history &/or exam, assessing all new/exacerbated conditions, and establishing a new plan of care. Today, my AYDIN, Verito Layton was here to observe my evaluation and management service for this new problem/exacerbated condition and follow the plan of care established by me going forward.  Thank you for allowing me to participate in the care of your patient.   Sincerely,   Myke Paniagua MD, RPVI, FACS Associate Professor of Surgery , Vascular Fellowship Director of Limb Salvage Surgery [facility] at [facility]

## 2024-05-15 NOTE — DATA REVIEWED
[FreeTextEntry1] : I performed an arterial duplex which was medically necessary to evaluate for endoleak. It showed patent endograft, no endoleak, residual sac 4.2 cm.

## 2024-05-30 ENCOUNTER — APPOINTMENT (OUTPATIENT)
Dept: CARDIOLOGY | Facility: CLINIC | Age: 79
End: 2024-05-30
Payer: MEDICARE

## 2024-05-30 VITALS
DIASTOLIC BLOOD PRESSURE: 78 MMHG | WEIGHT: 195 LBS | HEIGHT: 68 IN | SYSTOLIC BLOOD PRESSURE: 126 MMHG | BODY MASS INDEX: 29.55 KG/M2 | HEART RATE: 70 BPM

## 2024-05-30 DIAGNOSIS — I71.20 THORACIC AORTIC ANEURYSM, WITHOUT RUPTURE, UNSPECIFIED: ICD-10-CM

## 2024-05-30 DIAGNOSIS — I10 ESSENTIAL (PRIMARY) HYPERTENSION: ICD-10-CM

## 2024-05-30 DIAGNOSIS — I71.40 ABDOMINAL AORTIC ANEURYSM, WITHOUT RUPTURE, UNSPECIFIED: ICD-10-CM

## 2024-05-30 DIAGNOSIS — I47.10 SUPRAVENTRICULAR TACHYCARDIA, UNSPECIFIED: ICD-10-CM

## 2024-05-30 DIAGNOSIS — I70.213 ATHEROSCLEROSIS OF NATIVE ARTERIES OF EXTREMITIES WITH INTERMITTENT CLAUDICATION, BILATERAL LEGS: ICD-10-CM

## 2024-05-30 PROCEDURE — 99214 OFFICE O/P EST MOD 30 MIN: CPT | Mod: 25

## 2024-05-30 PROCEDURE — 93000 ELECTROCARDIOGRAM COMPLETE: CPT

## 2024-05-30 RX ORDER — METOPROLOL TARTRATE 50 MG/1
50 TABLET, FILM COATED ORAL
Qty: 180 | Refills: 0 | Status: DISCONTINUED | COMMUNITY
Start: 2023-07-10 | End: 2024-05-30

## 2024-05-30 NOTE — HISTORY OF PRESENT ILLNESS
[FreeTextEntry1] : 78-yo male with h/o PAD, AAA, post EVAR 7/23 no evidence of endoleak, followed by vascular , anemia, occasional SVT, mild ascending aorta aneurysm, chronic back pain presents for f/u. Patient denies palpitations CP but reports CRAIG.  Still smokes 6 cig a day.   LDL 71.

## 2024-05-30 NOTE — CARDIOLOGY SUMMARY
[de-identified] : 05/30/24:  SR with PACs, no ST changes.  [de-identified] : TTE\par  02/03/2022- EF 61%, grade I DD, AA 4.2 cm, mild aortic valve leaflet thickening [de-identified] : CT angio abdomen/pelvis:\par  05/31/2023- Infrarenal abdominal aneurysm 5.1 cm with extensive mural thrombus [de-identified] : Holter monitor:\par  12/17/2021-12/23/2021: 3 events of SVT, longest 19 beats.

## 2024-05-30 NOTE — ASSESSMENT
[FreeTextEntry1] : 79 y/o male with h/o AAA and LE PAD. HTN well controlled. Episodes of AT, asymptomatic now. S/p EVAR no evidence of endoleak  Dilated ascending aorta.  Plan: Smoking cessation discussed with patient. Continue Lisinopril. Metoprolol stopped. Repeat fasting blood work. 2D ECHO. F/u 6 months.  Landry Thakkar MD

## 2024-06-19 ENCOUNTER — APPOINTMENT (OUTPATIENT)
Dept: CARDIOLOGY | Facility: CLINIC | Age: 79
End: 2024-06-19
Payer: MEDICARE

## 2024-06-19 PROCEDURE — 93306 TTE W/DOPPLER COMPLETE: CPT

## 2024-07-13 ENCOUNTER — RX RENEWAL (OUTPATIENT)
Age: 79
End: 2024-07-13

## 2024-08-05 NOTE — PRE-ANESTHESIA EVALUATION ADULT - NSANTHDISPORD_GEN_ALL_CORE
[Left] : left wrist [Open growth plates] : Open growth plates [Fracture] : Fracture [FreeTextEntry8] : L DR fracture [de-identified] : L wrist- No TTP FAROM fingers  Left wrist 3 view xray shows: acceptable fracture alignment with healing   PACU

## 2024-09-04 ENCOUNTER — APPOINTMENT (OUTPATIENT)
Dept: VASCULAR SURGERY | Facility: CLINIC | Age: 79
End: 2024-09-04

## 2024-09-04 ENCOUNTER — APPOINTMENT (OUTPATIENT)
Dept: VASCULAR SURGERY | Facility: CLINIC | Age: 79
End: 2024-09-04
Payer: MEDICARE

## 2024-09-04 VITALS — HEART RATE: 74 BPM | DIASTOLIC BLOOD PRESSURE: 83 MMHG | SYSTOLIC BLOOD PRESSURE: 138 MMHG

## 2024-09-04 DIAGNOSIS — I71.40 ABDOMINAL AORTIC ANEURYSM, WITHOUT RUPTURE, UNSPECIFIED: ICD-10-CM

## 2024-09-04 PROCEDURE — 93978 VASCULAR STUDY: CPT

## 2024-09-04 PROCEDURE — 99212 OFFICE O/P EST SF 10 MIN: CPT

## 2024-09-04 NOTE — ASSESSMENT
[FreeTextEntry1] : S/p EVAR no complaints  duplex- 4.2 cm AAA sac no evidence of endoleak. follow up in 6 months    I, Dr. Paniagua, personally performed the evaluation and management (E/M) services for this established patient who presents today with (a) new problem(s)/exacerbation of (an) existing condition(s). That E/M includes conducting the clinically appropriate interval history &/or exam, assessing all new/exacerbated conditions, and establishing a new plan of care. Today, my AYDIN, Verito Layton was here to observe my evaluation and management service for this new problem/exacerbated condition and follow the plan of care established by me going forward.  Thank you for allowing me to participate in the care of your patient.   Sincerely,   Myke Paniagua MD, RPVI, FACS Associate Professor of Surgery , Vascular Fellowship Director of Limb Salvage Surgery [facility] [employer] [facility] at [facility]

## 2024-09-04 NOTE — HISTORY OF PRESENT ILLNESS
[FreeTextEntry1] : 78 y/o M with 5 cm, s/p EVAR - 7/24/23. Presents for follow up, denies any complaints.

## 2024-10-15 ENCOUNTER — RX RENEWAL (OUTPATIENT)
Age: 79
End: 2024-10-15

## 2024-11-07 ENCOUNTER — APPOINTMENT (OUTPATIENT)
Dept: CARDIOLOGY | Facility: CLINIC | Age: 79
End: 2024-11-07

## 2025-01-08 ENCOUNTER — APPOINTMENT (OUTPATIENT)
Dept: VASCULAR SURGERY | Facility: CLINIC | Age: 80
End: 2025-01-08
Payer: MEDICARE

## 2025-01-08 VITALS — HEIGHT: 68 IN | BODY MASS INDEX: 30.01 KG/M2 | WEIGHT: 198 LBS

## 2025-01-08 PROCEDURE — 99212 OFFICE O/P EST SF 10 MIN: CPT

## 2025-01-08 PROCEDURE — 93978 VASCULAR STUDY: CPT

## 2025-07-09 ENCOUNTER — APPOINTMENT (OUTPATIENT)
Dept: VASCULAR SURGERY | Facility: CLINIC | Age: 80
End: 2025-07-09
Payer: MEDICARE

## 2025-07-09 VITALS
HEART RATE: 88 BPM | HEIGHT: 68 IN | SYSTOLIC BLOOD PRESSURE: 135 MMHG | WEIGHT: 182 LBS | DIASTOLIC BLOOD PRESSURE: 70 MMHG | BODY MASS INDEX: 27.58 KG/M2

## 2025-07-09 PROCEDURE — 99212 OFFICE O/P EST SF 10 MIN: CPT

## 2025-07-09 PROCEDURE — 93978 VASCULAR STUDY: CPT
